# Patient Record
Sex: FEMALE | Race: WHITE | NOT HISPANIC OR LATINO | Employment: FULL TIME | URBAN - METROPOLITAN AREA
[De-identification: names, ages, dates, MRNs, and addresses within clinical notes are randomized per-mention and may not be internally consistent; named-entity substitution may affect disease eponyms.]

---

## 2017-10-04 ENCOUNTER — COMMUNICATION - HEALTHEAST (OUTPATIENT)
Dept: FAMILY MEDICINE | Facility: CLINIC | Age: 32
End: 2017-10-04

## 2017-10-20 ENCOUNTER — OFFICE VISIT - HEALTHEAST (OUTPATIENT)
Dept: FAMILY MEDICINE | Facility: CLINIC | Age: 32
End: 2017-10-20

## 2017-10-20 ENCOUNTER — COMMUNICATION - HEALTHEAST (OUTPATIENT)
Dept: TELEHEALTH | Facility: CLINIC | Age: 32
End: 2017-10-20

## 2017-10-20 DIAGNOSIS — Z00.00 ROUTINE GENERAL MEDICAL EXAMINATION AT A HEALTH CARE FACILITY: ICD-10-CM

## 2017-10-20 ASSESSMENT — MIFFLIN-ST. JEOR: SCORE: 1315.5

## 2018-01-05 ENCOUNTER — AMBULATORY - HEALTHEAST (OUTPATIENT)
Dept: LAB | Facility: CLINIC | Age: 33
End: 2018-01-05

## 2018-01-05 DIAGNOSIS — Z00.00 ROUTINE GENERAL MEDICAL EXAMINATION AT A HEALTH CARE FACILITY: ICD-10-CM

## 2018-01-05 LAB
CHOLEST SERPL-MCNC: 222 MG/DL
FASTING STATUS PATIENT QL REPORTED: YES
FASTING STATUS PATIENT QL REPORTED: YES
GLUCOSE BLD-MCNC: 86 MG/DL (ref 70–125)
HDLC SERPL-MCNC: 71 MG/DL
LDLC SERPL CALC-MCNC: 115 MG/DL
TRIGL SERPL-MCNC: 181 MG/DL
TSH SERPL DL<=0.005 MIU/L-ACNC: 1.5 UIU/ML (ref 0.3–5)

## 2018-01-08 ENCOUNTER — COMMUNICATION - HEALTHEAST (OUTPATIENT)
Dept: FAMILY MEDICINE | Facility: CLINIC | Age: 33
End: 2018-01-08

## 2018-01-08 LAB — 25(OH)D3 SERPL-MCNC: 28.5 NG/ML (ref 30–80)

## 2018-05-21 ENCOUNTER — OFFICE VISIT - HEALTHEAST (OUTPATIENT)
Dept: FAMILY MEDICINE | Facility: CLINIC | Age: 33
End: 2018-05-21

## 2018-05-21 DIAGNOSIS — Z12.4 SCREENING FOR CERVICAL CANCER: ICD-10-CM

## 2018-05-21 DIAGNOSIS — Z01.419 PAP SMEAR, LOW-RISK: ICD-10-CM

## 2018-05-21 DIAGNOSIS — Z31.69 ENCOUNTER FOR PRECONCEPTION CONSULTATION: ICD-10-CM

## 2018-05-22 LAB
HPV SOURCE: ABNORMAL
HUMAN PAPILLOMA VIRUS 16 DNA: NEGATIVE
HUMAN PAPILLOMA VIRUS 18 DNA: NEGATIVE
HUMAN PAPILLOMA VIRUS FINAL DIAGNOSIS: ABNORMAL
HUMAN PAPILLOMA VIRUS OTHER HR: POSITIVE
SPECIMEN DESCRIPTION: ABNORMAL

## 2018-05-29 LAB
BKR LAB AP ABNORMAL BLEEDING: NO
BKR LAB AP BIRTH CONTROL/HORMONES: ABNORMAL
BKR LAB AP CERVICAL APPEARANCE: ABNORMAL
BKR LAB AP GYN ADEQUACY: ABNORMAL
BKR LAB AP GYN INTERPRETATION: ABNORMAL
BKR LAB AP HPV REFLEX: ABNORMAL
BKR LAB AP LMP: ABNORMAL
BKR LAB AP PATIENT STATUS: ABNORMAL
BKR LAB AP PREVIOUS ABNORMAL: ABNORMAL
BKR LAB AP PREVIOUS NORMAL: 2014
HIGH RISK?: NO
PATH REPORT.COMMENTS IMP SPEC: ABNORMAL
RESULT FLAG (HE HISTORICAL CONVERSION): ABNORMAL

## 2018-05-30 ENCOUNTER — COMMUNICATION - HEALTHEAST (OUTPATIENT)
Dept: FAMILY MEDICINE | Facility: CLINIC | Age: 33
End: 2018-05-30

## 2018-05-30 ENCOUNTER — AMBULATORY - HEALTHEAST (OUTPATIENT)
Dept: FAMILY MEDICINE | Facility: CLINIC | Age: 33
End: 2018-05-30

## 2018-05-30 DIAGNOSIS — R87.810 ASCUS WITH POSITIVE HIGH RISK HPV CERVICAL: ICD-10-CM

## 2018-05-30 DIAGNOSIS — R87.610 ASCUS WITH POSITIVE HIGH RISK HPV CERVICAL: ICD-10-CM

## 2018-06-03 ENCOUNTER — RECORDS - HEALTHEAST (OUTPATIENT)
Dept: ADMINISTRATIVE | Facility: OTHER | Age: 33
End: 2018-06-03

## 2018-07-10 ENCOUNTER — COMMUNICATION - HEALTHEAST (OUTPATIENT)
Dept: FAMILY MEDICINE | Facility: CLINIC | Age: 33
End: 2018-07-10

## 2018-07-11 ENCOUNTER — AMBULATORY - HEALTHEAST (OUTPATIENT)
Dept: FAMILY MEDICINE | Facility: CLINIC | Age: 33
End: 2018-07-11

## 2018-07-11 DIAGNOSIS — Z32.01 PREGNANCY TEST POSITIVE: ICD-10-CM

## 2018-07-19 ENCOUNTER — AMBULATORY - HEALTHEAST (OUTPATIENT)
Dept: FAMILY MEDICINE | Facility: CLINIC | Age: 33
End: 2018-07-19

## 2018-07-19 ENCOUNTER — COMMUNICATION - HEALTHEAST (OUTPATIENT)
Dept: FAMILY MEDICINE | Facility: CLINIC | Age: 33
End: 2018-07-19

## 2018-07-19 DIAGNOSIS — Z32.01 PREGNANCY TEST POSITIVE: ICD-10-CM

## 2018-07-20 ENCOUNTER — COMMUNICATION - HEALTHEAST (OUTPATIENT)
Dept: FAMILY MEDICINE | Facility: CLINIC | Age: 33
End: 2018-07-20

## 2018-07-20 ENCOUNTER — AMBULATORY - HEALTHEAST (OUTPATIENT)
Dept: FAMILY MEDICINE | Facility: CLINIC | Age: 33
End: 2018-07-20

## 2018-07-20 ENCOUNTER — AMBULATORY - HEALTHEAST (OUTPATIENT)
Dept: LAB | Facility: CLINIC | Age: 33
End: 2018-07-20

## 2018-07-20 DIAGNOSIS — Z32.01 PREGNANCY TEST POSITIVE: ICD-10-CM

## 2018-07-20 LAB — HCG SERPL-ACNC: ABNORMAL MLU/ML (ref 0–4)

## 2018-07-22 ENCOUNTER — AMBULATORY - HEALTHEAST (OUTPATIENT)
Dept: LAB | Facility: CLINIC | Age: 33
End: 2018-07-22

## 2018-07-22 DIAGNOSIS — Z32.01 PREGNANCY TEST POSITIVE: ICD-10-CM

## 2018-07-22 LAB — HCG SERPL-ACNC: ABNORMAL MLU/ML (ref 0–4)

## 2018-07-23 ENCOUNTER — AMBULATORY - HEALTHEAST (OUTPATIENT)
Dept: FAMILY MEDICINE | Facility: CLINIC | Age: 33
End: 2018-07-23

## 2018-07-23 DIAGNOSIS — O26.859 SPOTTING IN EARLY PREGNANCY: ICD-10-CM

## 2018-07-25 ENCOUNTER — HOSPITAL ENCOUNTER (OUTPATIENT)
Dept: ULTRASOUND IMAGING | Facility: CLINIC | Age: 33
Discharge: HOME | End: 2018-07-25
Attending: FAMILY MEDICINE

## 2018-07-25 ENCOUNTER — AMBULATORY - HEALTHEAST (OUTPATIENT)
Dept: LAB | Facility: CLINIC | Age: 33
End: 2018-07-25

## 2018-07-25 DIAGNOSIS — Z32.01 PREGNANCY TEST POSITIVE: ICD-10-CM

## 2018-07-25 DIAGNOSIS — O26.859 SPOTTING IN EARLY PREGNANCY: ICD-10-CM

## 2018-07-25 LAB — HCG SERPL-ACNC: ABNORMAL MLU/ML (ref 0–4)

## 2018-07-30 ENCOUNTER — COMMUNICATION - HEALTHEAST (OUTPATIENT)
Dept: FAMILY MEDICINE | Facility: CLINIC | Age: 33
End: 2018-07-30

## 2018-08-24 ENCOUNTER — RECORDS - HEALTHEAST (OUTPATIENT)
Dept: ADMINISTRATIVE | Facility: OTHER | Age: 33
End: 2018-08-24

## 2018-08-24 ENCOUNTER — PRENATAL OFFICE VISIT - HEALTHEAST (OUTPATIENT)
Dept: FAMILY MEDICINE | Facility: CLINIC | Age: 33
End: 2018-08-24

## 2018-08-24 DIAGNOSIS — Z33.1 PREGNANT STATE, INCIDENTAL: ICD-10-CM

## 2018-08-24 DIAGNOSIS — Z34.01 SUPERVISION OF NORMAL FIRST PREGNANCY IN FIRST TRIMESTER: ICD-10-CM

## 2018-08-24 LAB
ALBUMIN UR-MCNC: NEGATIVE MG/DL
APPEARANCE UR: CLEAR
BASOPHILS # BLD AUTO: 0 THOU/UL (ref 0–0.2)
BASOPHILS NFR BLD AUTO: 0 % (ref 0–2)
BILIRUB UR QL STRIP: NEGATIVE
COLOR UR AUTO: YELLOW
EOSINOPHIL # BLD AUTO: 0.1 THOU/UL (ref 0–0.4)
EOSINOPHIL NFR BLD AUTO: 1 % (ref 0–6)
ERYTHROCYTE [DISTWIDTH] IN BLOOD BY AUTOMATED COUNT: 12.6 % (ref 11–14.5)
GLUCOSE UR STRIP-MCNC: NEGATIVE MG/DL
HCT VFR BLD AUTO: 36.1 % (ref 35–47)
HGB BLD-MCNC: 11.9 G/DL (ref 12–16)
HGB UR QL STRIP: ABNORMAL
HIV 1+2 AB+HIV1 P24 AG SERPL QL IA: NEGATIVE
KETONES UR STRIP-MCNC: NEGATIVE MG/DL
LEUKOCYTE ESTERASE UR QL STRIP: NEGATIVE
LYMPHOCYTES # BLD AUTO: 3.3 THOU/UL (ref 0.8–4.4)
LYMPHOCYTES NFR BLD AUTO: 33 % (ref 20–40)
MCH RBC QN AUTO: 31.5 PG (ref 27–34)
MCHC RBC AUTO-ENTMCNC: 33 G/DL (ref 32–36)
MCV RBC AUTO: 96 FL (ref 80–100)
MONOCYTES # BLD AUTO: 0.7 THOU/UL (ref 0–0.9)
MONOCYTES NFR BLD AUTO: 7 % (ref 2–10)
NEUTROPHILS # BLD AUTO: 5.9 THOU/UL (ref 2–7.7)
NEUTROPHILS NFR BLD AUTO: 59 % (ref 50–70)
NITRATE UR QL: NEGATIVE
PH UR STRIP: 6 [PH] (ref 5–8)
PLATELET # BLD AUTO: 237 THOU/UL (ref 140–440)
PMV BLD AUTO: 11.2 FL (ref 8.5–12.5)
RBC # BLD AUTO: 3.78 MILL/UL (ref 3.8–5.4)
SP GR UR STRIP: 1.02 (ref 1–1.03)
UROBILINOGEN UR STRIP-ACNC: ABNORMAL
WBC: 10.1 THOU/UL (ref 4–11)

## 2018-08-25 LAB
ANTIBODY SCREEN: NEGATIVE
BACTERIA SPEC CULT: NO GROWTH
HBV SURFACE AG SERPL QL IA: NEGATIVE
T PALLIDUM AB SER QL: NEGATIVE

## 2018-08-26 LAB
B19V IGG SER IA-ACNC: 5.6 IV
B19V IGM SER IA-ACNC: 0.27 IV
T GONDII IGG SER-ACNC: <3 IU/ML
T GONDII IGM SER-ACNC: <3 AU/ML

## 2018-08-27 LAB
ABO/RH(D): NORMAL
ABORH REPEAT: NORMAL
C TRACH DNA SPEC QL PROBE+SIG AMP: NEGATIVE
CMV IGG SERPL IA-ACNC: >8 AI (ref 0–0.8)
HCV AB SERPL QL IA: NEGATIVE
N GONORRHOEA DNA SPEC QL NAA+PROBE: NEGATIVE
RUBV IGG SERPL QL IA: POSITIVE

## 2018-09-01 ENCOUNTER — RECORDS - HEALTHEAST (OUTPATIENT)
Dept: ADMINISTRATIVE | Facility: OTHER | Age: 33
End: 2018-09-01

## 2018-09-05 ENCOUNTER — COMMUNICATION - HEALTHEAST (OUTPATIENT)
Dept: FAMILY MEDICINE | Facility: CLINIC | Age: 33
End: 2018-09-05

## 2018-09-16 ENCOUNTER — COMMUNICATION - HEALTHEAST (OUTPATIENT)
Dept: FAMILY MEDICINE | Facility: CLINIC | Age: 33
End: 2018-09-16

## 2018-09-17 ENCOUNTER — AMBULATORY - HEALTHEAST (OUTPATIENT)
Dept: FAMILY MEDICINE | Facility: CLINIC | Age: 33
End: 2018-09-17

## 2018-09-17 DIAGNOSIS — N39.3 FEMALE STRESS INCONTINENCE: ICD-10-CM

## 2018-09-21 ENCOUNTER — PRENATAL OFFICE VISIT - HEALTHEAST (OUTPATIENT)
Dept: FAMILY MEDICINE | Facility: CLINIC | Age: 33
End: 2018-09-21

## 2018-09-21 DIAGNOSIS — Z34.02 SUPERVISION OF NORMAL FIRST PREGNANCY IN SECOND TRIMESTER: ICD-10-CM

## 2018-09-24 ENCOUNTER — OFFICE VISIT - HEALTHEAST (OUTPATIENT)
Dept: PHYSICAL THERAPY | Facility: REHABILITATION | Age: 33
End: 2018-09-24

## 2018-09-24 DIAGNOSIS — N81.89 PELVIC FLOOR WEAKNESS IN FEMALE: ICD-10-CM

## 2018-09-24 DIAGNOSIS — Z34.90 PREGNANCY: ICD-10-CM

## 2018-09-24 DIAGNOSIS — N39.3 FEMALE STRESS INCONTINENCE: ICD-10-CM

## 2018-10-03 ENCOUNTER — OFFICE VISIT - HEALTHEAST (OUTPATIENT)
Dept: PHYSICAL THERAPY | Facility: REHABILITATION | Age: 33
End: 2018-10-03

## 2018-10-03 DIAGNOSIS — Z34.90 PREGNANCY: ICD-10-CM

## 2018-10-03 DIAGNOSIS — N39.3 FEMALE STRESS INCONTINENCE: ICD-10-CM

## 2018-10-03 DIAGNOSIS — N81.89 PELVIC FLOOR WEAKNESS IN FEMALE: ICD-10-CM

## 2018-10-10 ENCOUNTER — OFFICE VISIT - HEALTHEAST (OUTPATIENT)
Dept: PHYSICAL THERAPY | Facility: REHABILITATION | Age: 33
End: 2018-10-10

## 2018-10-10 DIAGNOSIS — N81.89 PELVIC FLOOR WEAKNESS IN FEMALE: ICD-10-CM

## 2018-10-10 DIAGNOSIS — N39.3 FEMALE STRESS INCONTINENCE: ICD-10-CM

## 2018-10-10 DIAGNOSIS — Z34.90 PREGNANCY: ICD-10-CM

## 2018-10-24 ENCOUNTER — OFFICE VISIT - HEALTHEAST (OUTPATIENT)
Dept: PHYSICAL THERAPY | Facility: REHABILITATION | Age: 33
End: 2018-10-24

## 2018-10-24 ENCOUNTER — HOSPITAL ENCOUNTER (OUTPATIENT)
Dept: ULTRASOUND IMAGING | Facility: CLINIC | Age: 33
Discharge: HOME | End: 2018-10-24
Attending: FAMILY MEDICINE

## 2018-10-24 DIAGNOSIS — N81.89 PELVIC FLOOR WEAKNESS IN FEMALE: ICD-10-CM

## 2018-10-24 DIAGNOSIS — N39.3 FEMALE STRESS INCONTINENCE: ICD-10-CM

## 2018-10-24 DIAGNOSIS — Z34.90 PREGNANCY: ICD-10-CM

## 2018-10-24 DIAGNOSIS — Z34.02 SUPERVISION OF NORMAL FIRST PREGNANCY IN SECOND TRIMESTER: ICD-10-CM

## 2018-10-31 ENCOUNTER — PRENATAL OFFICE VISIT - HEALTHEAST (OUTPATIENT)
Dept: FAMILY MEDICINE | Facility: CLINIC | Age: 33
End: 2018-10-31

## 2018-10-31 DIAGNOSIS — Z34.02 SUPERVISION OF NORMAL FIRST PREGNANCY IN SECOND TRIMESTER: ICD-10-CM

## 2018-11-07 ENCOUNTER — OFFICE VISIT - HEALTHEAST (OUTPATIENT)
Dept: PHYSICAL THERAPY | Facility: REHABILITATION | Age: 33
End: 2018-11-07

## 2018-11-07 DIAGNOSIS — Z34.90 PREGNANCY: ICD-10-CM

## 2018-11-07 DIAGNOSIS — N81.89 PELVIC FLOOR WEAKNESS IN FEMALE: ICD-10-CM

## 2018-11-07 DIAGNOSIS — N39.3 FEMALE STRESS INCONTINENCE: ICD-10-CM

## 2018-11-21 ENCOUNTER — PRENATAL OFFICE VISIT - HEALTHEAST (OUTPATIENT)
Dept: FAMILY MEDICINE | Facility: CLINIC | Age: 33
End: 2018-11-21

## 2018-11-21 DIAGNOSIS — Z34.02 SUPERVISION OF NORMAL FIRST PREGNANCY IN SECOND TRIMESTER: ICD-10-CM

## 2018-12-12 ENCOUNTER — AMBULATORY - HEALTHEAST (OUTPATIENT)
Dept: FAMILY MEDICINE | Facility: CLINIC | Age: 33
End: 2018-12-12

## 2018-12-12 ENCOUNTER — OFFICE VISIT - HEALTHEAST (OUTPATIENT)
Dept: PHYSICAL THERAPY | Facility: REHABILITATION | Age: 33
End: 2018-12-12

## 2018-12-12 DIAGNOSIS — N81.89 PELVIC FLOOR WEAKNESS IN FEMALE: ICD-10-CM

## 2018-12-12 DIAGNOSIS — N39.3 FEMALE STRESS INCONTINENCE: ICD-10-CM

## 2018-12-12 DIAGNOSIS — M53.3 SI (SACROILIAC) JOINT DYSFUNCTION: ICD-10-CM

## 2018-12-12 DIAGNOSIS — Z34.90 PREGNANCY: ICD-10-CM

## 2018-12-19 ENCOUNTER — PRENATAL OFFICE VISIT - HEALTHEAST (OUTPATIENT)
Dept: FAMILY MEDICINE | Facility: CLINIC | Age: 33
End: 2018-12-19

## 2018-12-19 ENCOUNTER — COMMUNICATION - HEALTHEAST (OUTPATIENT)
Dept: FAMILY MEDICINE | Facility: CLINIC | Age: 33
End: 2018-12-19

## 2018-12-19 DIAGNOSIS — Z34.03 SUPERVISION OF NORMAL FIRST PREGNANCY IN THIRD TRIMESTER: ICD-10-CM

## 2018-12-19 DIAGNOSIS — Z34.02 SUPERVISION OF NORMAL FIRST PREGNANCY IN SECOND TRIMESTER: ICD-10-CM

## 2018-12-19 DIAGNOSIS — M53.3 SI (SACROILIAC) JOINT DYSFUNCTION: ICD-10-CM

## 2018-12-19 LAB
FASTING STATUS PATIENT QL REPORTED: NO
GLUCOSE 1H P 50 G GLC PO SERPL-MCNC: 112 MG/DL (ref 70–139)
HGB BLD-MCNC: 11.9 G/DL (ref 12–16)

## 2018-12-20 ENCOUNTER — COMMUNICATION - HEALTHEAST (OUTPATIENT)
Dept: FAMILY MEDICINE | Facility: CLINIC | Age: 33
End: 2018-12-20

## 2018-12-20 LAB — T PALLIDUM AB SER QL: NEGATIVE

## 2019-01-02 ENCOUNTER — PRENATAL OFFICE VISIT - HEALTHEAST (OUTPATIENT)
Dept: FAMILY MEDICINE | Facility: CLINIC | Age: 34
End: 2019-01-02

## 2019-01-02 DIAGNOSIS — Z34.03 SUPERVISION OF NORMAL FIRST PREGNANCY IN THIRD TRIMESTER: ICD-10-CM

## 2019-01-03 ENCOUNTER — COMMUNICATION - HEALTHEAST (OUTPATIENT)
Dept: ADMINISTRATIVE | Facility: CLINIC | Age: 34
End: 2019-01-03

## 2019-01-16 ENCOUNTER — PRENATAL OFFICE VISIT - HEALTHEAST (OUTPATIENT)
Dept: FAMILY MEDICINE | Facility: CLINIC | Age: 34
End: 2019-01-16

## 2019-01-16 ENCOUNTER — OFFICE VISIT - HEALTHEAST (OUTPATIENT)
Dept: PHYSICAL THERAPY | Facility: REHABILITATION | Age: 34
End: 2019-01-16

## 2019-01-16 DIAGNOSIS — M54.6 PAIN IN THORACIC SPINE: ICD-10-CM

## 2019-01-16 DIAGNOSIS — Z34.03 SUPERVISION OF NORMAL FIRST PREGNANCY IN THIRD TRIMESTER: ICD-10-CM

## 2019-01-16 DIAGNOSIS — M53.3 SACRAL PAIN: ICD-10-CM

## 2019-01-30 ENCOUNTER — PRENATAL OFFICE VISIT - HEALTHEAST (OUTPATIENT)
Dept: FAMILY MEDICINE | Facility: CLINIC | Age: 34
End: 2019-01-30

## 2019-01-30 DIAGNOSIS — Z34.03 SUPERVISION OF NORMAL FIRST PREGNANCY IN THIRD TRIMESTER: ICD-10-CM

## 2019-02-13 ENCOUNTER — PRENATAL OFFICE VISIT - HEALTHEAST (OUTPATIENT)
Dept: FAMILY MEDICINE | Facility: CLINIC | Age: 34
End: 2019-02-13

## 2019-02-13 ENCOUNTER — COMMUNICATION - HEALTHEAST (OUTPATIENT)
Dept: FAMILY MEDICINE | Facility: CLINIC | Age: 34
End: 2019-02-13

## 2019-02-13 DIAGNOSIS — N89.8 VAGINAL DISCHARGE: ICD-10-CM

## 2019-02-13 DIAGNOSIS — Z34.03 SUPERVISION OF NORMAL FIRST PREGNANCY IN THIRD TRIMESTER: ICD-10-CM

## 2019-02-13 DIAGNOSIS — Z32.01 PREGNANCY TEST POSITIVE: ICD-10-CM

## 2019-02-13 LAB
CLUE CELLS: ABNORMAL
HGB BLD-MCNC: 12.5 G/DL (ref 12–16)
TRICHOMONAS, WET PREP: ABNORMAL
YEAST, WET PREP: ABNORMAL

## 2019-02-14 ENCOUNTER — COMMUNICATION - HEALTHEAST (OUTPATIENT)
Dept: FAMILY MEDICINE | Facility: CLINIC | Age: 34
End: 2019-02-14

## 2019-02-14 LAB
ALLERGIC TO PENICILLIN: NO
GP B STREP DNA SPEC QL NAA+PROBE: NEGATIVE

## 2019-02-20 ENCOUNTER — PRENATAL OFFICE VISIT - HEALTHEAST (OUTPATIENT)
Dept: FAMILY MEDICINE | Facility: CLINIC | Age: 34
End: 2019-02-20

## 2019-02-20 DIAGNOSIS — Z34.03 SUPERVISION OF NORMAL FIRST PREGNANCY IN THIRD TRIMESTER: ICD-10-CM

## 2019-02-27 ENCOUNTER — PRENATAL OFFICE VISIT - HEALTHEAST (OUTPATIENT)
Dept: FAMILY MEDICINE | Facility: CLINIC | Age: 34
End: 2019-02-27

## 2019-02-27 ENCOUNTER — COMMUNICATION - HEALTHEAST (OUTPATIENT)
Dept: FAMILY MEDICINE | Facility: CLINIC | Age: 34
End: 2019-02-27

## 2019-02-27 DIAGNOSIS — Z34.03 SUPERVISION OF NORMAL FIRST PREGNANCY IN THIRD TRIMESTER: ICD-10-CM

## 2019-02-27 LAB
ALBUMIN UR-MCNC: NEGATIVE MG/DL
CLUE CELLS: NORMAL
GLUCOSE UR STRIP-MCNC: NEGATIVE MG/DL
KETONES UR STRIP-MCNC: NEGATIVE MG/DL
TRICHOMONAS, WET PREP: NORMAL
YEAST, WET PREP: NORMAL

## 2019-03-04 ENCOUNTER — AMBULATORY - HEALTHEAST (OUTPATIENT)
Dept: OTHER | Facility: CLINIC | Age: 34
End: 2019-03-04

## 2019-03-06 ENCOUNTER — PRENATAL OFFICE VISIT - HEALTHEAST (OUTPATIENT)
Dept: FAMILY MEDICINE | Facility: CLINIC | Age: 34
End: 2019-03-06

## 2019-03-06 DIAGNOSIS — Z34.03 SUPERVISION OF NORMAL FIRST PREGNANCY IN THIRD TRIMESTER: ICD-10-CM

## 2019-03-06 LAB
ALBUMIN UR-MCNC: NEGATIVE MG/DL
GLUCOSE UR STRIP-MCNC: NEGATIVE MG/DL
KETONES UR STRIP-MCNC: NEGATIVE MG/DL

## 2019-03-07 ENCOUNTER — ANESTHESIA - HEALTHEAST (OUTPATIENT)
Dept: OBGYN | Facility: CLINIC | Age: 34
End: 2019-03-07

## 2019-03-09 ENCOUNTER — HOME CARE/HOSPICE - HEALTHEAST (OUTPATIENT)
Dept: HOME HEALTH SERVICES | Facility: HOME HEALTH | Age: 34
End: 2019-03-09

## 2019-04-18 ENCOUNTER — AMBULATORY - HEALTHEAST (OUTPATIENT)
Dept: FAMILY MEDICINE | Facility: CLINIC | Age: 34
End: 2019-04-18

## 2019-04-18 LAB — HCG UR QL: NEGATIVE

## 2019-04-22 ENCOUNTER — OFFICE VISIT - HEALTHEAST (OUTPATIENT)
Dept: FAMILY MEDICINE | Facility: CLINIC | Age: 34
End: 2019-04-22

## 2019-04-22 ENCOUNTER — COMMUNICATION - HEALTHEAST (OUTPATIENT)
Dept: FAMILY MEDICINE | Facility: CLINIC | Age: 34
End: 2019-04-22

## 2019-04-22 DIAGNOSIS — Z30.430 ENCOUNTER FOR IUD INSERTION: ICD-10-CM

## 2019-04-22 LAB — HGB BLD-MCNC: 13.5 G/DL (ref 12–16)

## 2019-04-29 LAB
BKR LAB AP ABNORMAL BLEEDING: NO
BKR LAB AP BIRTH CONTROL/HORMONES: NORMAL
BKR LAB AP CERVICAL APPEARANCE: NORMAL
BKR LAB AP GYN ADEQUACY: NORMAL
BKR LAB AP GYN INTERPRETATION: NORMAL
BKR LAB AP HPV REFLEX: NORMAL
BKR LAB AP LMP: NORMAL
BKR LAB AP PATIENT STATUS: NORMAL
BKR LAB AP PREVIOUS ABNORMAL: 2018
BKR LAB AP PREVIOUS NORMAL: 2018
HIGH RISK?: NO
PATH REPORT.COMMENTS IMP SPEC: NORMAL
RESULT FLAG (HE HISTORICAL CONVERSION): NORMAL

## 2019-05-06 ENCOUNTER — COMMUNICATION - HEALTHEAST (OUTPATIENT)
Dept: FAMILY MEDICINE | Facility: CLINIC | Age: 34
End: 2019-05-06

## 2019-06-06 ENCOUNTER — COMMUNICATION - HEALTHEAST (OUTPATIENT)
Dept: FAMILY MEDICINE | Facility: CLINIC | Age: 34
End: 2019-06-06

## 2019-06-14 ENCOUNTER — COMMUNICATION - HEALTHEAST (OUTPATIENT)
Dept: MIDWIFE SERVICES | Facility: CLINIC | Age: 34
End: 2019-06-14

## 2019-07-10 ENCOUNTER — OFFICE VISIT - HEALTHEAST (OUTPATIENT)
Dept: FAMILY MEDICINE | Facility: CLINIC | Age: 34
End: 2019-07-10

## 2019-07-10 DIAGNOSIS — F51.01 PRIMARY INSOMNIA: ICD-10-CM

## 2019-11-07 ENCOUNTER — AMBULATORY - HEALTHEAST (OUTPATIENT)
Dept: FAMILY MEDICINE | Facility: CLINIC | Age: 34
End: 2019-11-07

## 2019-11-07 ENCOUNTER — AMBULATORY - HEALTHEAST (OUTPATIENT)
Dept: LAB | Facility: CLINIC | Age: 34
End: 2019-11-07

## 2019-11-07 DIAGNOSIS — N89.8 VAGINAL DISCHARGE: ICD-10-CM

## 2019-11-07 LAB
CLUE CELLS: NORMAL
TRICHOMONAS, WET PREP: NORMAL
YEAST, WET PREP: NORMAL

## 2019-12-16 ENCOUNTER — OFFICE VISIT - HEALTHEAST (OUTPATIENT)
Dept: FAMILY MEDICINE | Facility: CLINIC | Age: 34
End: 2019-12-16

## 2019-12-16 DIAGNOSIS — B37.31 YEAST VAGINITIS: ICD-10-CM

## 2019-12-16 DIAGNOSIS — N89.8 VAGINAL DISCHARGE: ICD-10-CM

## 2019-12-16 LAB
CLUE CELLS: ABNORMAL
TRICHOMONAS, WET PREP: ABNORMAL
YEAST, WET PREP: ABNORMAL

## 2019-12-16 ASSESSMENT — MIFFLIN-ST. JEOR: SCORE: 1283.75

## 2020-01-31 ENCOUNTER — COMMUNICATION - HEALTHEAST (OUTPATIENT)
Dept: FAMILY MEDICINE | Facility: CLINIC | Age: 35
End: 2020-01-31

## 2020-02-21 ENCOUNTER — COMMUNICATION - HEALTHEAST (OUTPATIENT)
Dept: FAMILY MEDICINE | Facility: CLINIC | Age: 35
End: 2020-02-21

## 2020-02-21 ENCOUNTER — AMBULATORY - HEALTHEAST (OUTPATIENT)
Dept: FAMILY MEDICINE | Facility: CLINIC | Age: 35
End: 2020-02-21

## 2020-02-21 ENCOUNTER — OFFICE VISIT - HEALTHEAST (OUTPATIENT)
Dept: FAMILY MEDICINE | Facility: CLINIC | Age: 35
End: 2020-02-21

## 2020-02-21 DIAGNOSIS — Z78.9 USES BIRTH CONTROL: ICD-10-CM

## 2020-02-21 DIAGNOSIS — Z30.432 ENCOUNTER FOR IUD REMOVAL: ICD-10-CM

## 2020-03-04 ENCOUNTER — AMBULATORY - HEALTHEAST (OUTPATIENT)
Dept: FAMILY MEDICINE | Facility: CLINIC | Age: 35
End: 2020-03-04

## 2020-03-04 DIAGNOSIS — Z20.828 EXPOSURE TO INFLUENZA: ICD-10-CM

## 2020-03-11 ENCOUNTER — OFFICE VISIT - HEALTHEAST (OUTPATIENT)
Dept: FAMILY MEDICINE | Facility: CLINIC | Age: 35
End: 2020-03-11

## 2020-03-11 DIAGNOSIS — M54.2 NECK PAIN: ICD-10-CM

## 2020-03-16 ENCOUNTER — RECORDS - HEALTHEAST (OUTPATIENT)
Dept: GENERAL RADIOLOGY | Facility: CLINIC | Age: 35
End: 2020-03-16

## 2020-03-16 DIAGNOSIS — M54.2 CERVICALGIA: ICD-10-CM

## 2020-03-18 ENCOUNTER — COMMUNICATION - HEALTHEAST (OUTPATIENT)
Dept: FAMILY MEDICINE | Facility: CLINIC | Age: 35
End: 2020-03-18

## 2020-03-21 ENCOUNTER — COMMUNICATION - HEALTHEAST (OUTPATIENT)
Dept: FAMILY MEDICINE | Facility: CLINIC | Age: 35
End: 2020-03-21

## 2020-03-23 ENCOUNTER — OFFICE VISIT - HEALTHEAST (OUTPATIENT)
Dept: FAMILY MEDICINE | Facility: CLINIC | Age: 35
End: 2020-03-23

## 2020-03-23 ENCOUNTER — AMBULATORY - HEALTHEAST (OUTPATIENT)
Dept: FAMILY MEDICINE | Facility: CLINIC | Age: 35
End: 2020-03-23

## 2020-03-23 DIAGNOSIS — F34.1 DYSTHYMIA: ICD-10-CM

## 2020-03-23 DIAGNOSIS — F51.01 PRIMARY INSOMNIA: ICD-10-CM

## 2020-03-23 DIAGNOSIS — M53.3 SI (SACROILIAC) JOINT DYSFUNCTION: ICD-10-CM

## 2020-03-23 RX ORDER — CYCLOBENZAPRINE HCL 10 MG
5 TABLET ORAL EVERY 8 HOURS PRN
Qty: 30 TABLET | Refills: 1 | Status: SHIPPED | OUTPATIENT
Start: 2020-03-23 | End: 2021-09-15

## 2020-03-23 ASSESSMENT — ANXIETY QUESTIONNAIRES
7. FEELING AFRAID AS IF SOMETHING AWFUL MIGHT HAPPEN: NEARLY EVERY DAY
GAD7 TOTAL SCORE: 19
IF YOU CHECKED OFF ANY PROBLEMS ON THIS QUESTIONNAIRE, HOW DIFFICULT HAVE THESE PROBLEMS MADE IT FOR YOU TO DO YOUR WORK, TAKE CARE OF THINGS AT HOME, OR GET ALONG WITH OTHER PEOPLE: EXTREMELY DIFFICULT
3. WORRYING TOO MUCH ABOUT DIFFERENT THINGS: MORE THAN HALF THE DAYS
1. FEELING NERVOUS, ANXIOUS, OR ON EDGE: NEARLY EVERY DAY
6. BECOMING EASILY ANNOYED OR IRRITABLE: NEARLY EVERY DAY
4. TROUBLE RELAXING: NEARLY EVERY DAY
2. NOT BEING ABLE TO STOP OR CONTROL WORRYING: MORE THAN HALF THE DAYS
5. BEING SO RESTLESS THAT IT IS HARD TO SIT STILL: NEARLY EVERY DAY

## 2020-03-23 ASSESSMENT — PATIENT HEALTH QUESTIONNAIRE - PHQ9: SUM OF ALL RESPONSES TO PHQ QUESTIONS 1-9: 11

## 2020-03-25 ENCOUNTER — COMMUNICATION - HEALTHEAST (OUTPATIENT)
Dept: FAMILY MEDICINE | Facility: CLINIC | Age: 35
End: 2020-03-25

## 2020-03-26 ENCOUNTER — AMBULATORY - HEALTHEAST (OUTPATIENT)
Dept: FAMILY MEDICINE | Facility: CLINIC | Age: 35
End: 2020-03-26

## 2020-03-26 DIAGNOSIS — B00.9 HERPES SIMPLEX VIRUS INFECTION: ICD-10-CM

## 2020-04-07 ENCOUNTER — COMMUNICATION - HEALTHEAST (OUTPATIENT)
Dept: PHYSICAL THERAPY | Facility: REHABILITATION | Age: 35
End: 2020-04-07

## 2020-04-09 ENCOUNTER — OFFICE VISIT - HEALTHEAST (OUTPATIENT)
Dept: FAMILY MEDICINE | Facility: CLINIC | Age: 35
End: 2020-04-09

## 2020-04-09 DIAGNOSIS — R05.9 COUGH: ICD-10-CM

## 2020-04-10 ENCOUNTER — COMMUNICATION - HEALTHEAST (OUTPATIENT)
Dept: FAMILY MEDICINE | Facility: CLINIC | Age: 35
End: 2020-04-10

## 2020-04-16 ENCOUNTER — OFFICE VISIT - HEALTHEAST (OUTPATIENT)
Dept: FAMILY MEDICINE | Facility: CLINIC | Age: 35
End: 2020-04-16

## 2020-04-16 DIAGNOSIS — F34.1 DYSTHYMIA: ICD-10-CM

## 2020-04-16 DIAGNOSIS — Z79.899 MEDICATION MANAGEMENT: ICD-10-CM

## 2020-04-16 ASSESSMENT — ANXIETY QUESTIONNAIRES
2. NOT BEING ABLE TO STOP OR CONTROL WORRYING: SEVERAL DAYS
3. WORRYING TOO MUCH ABOUT DIFFERENT THINGS: NEARLY EVERY DAY
IF YOU CHECKED OFF ANY PROBLEMS ON THIS QUESTIONNAIRE, HOW DIFFICULT HAVE THESE PROBLEMS MADE IT FOR YOU TO DO YOUR WORK, TAKE CARE OF THINGS AT HOME, OR GET ALONG WITH OTHER PEOPLE: SOMEWHAT DIFFICULT
6. BECOMING EASILY ANNOYED OR IRRITABLE: SEVERAL DAYS
7. FEELING AFRAID AS IF SOMETHING AWFUL MIGHT HAPPEN: SEVERAL DAYS
4. TROUBLE RELAXING: NEARLY EVERY DAY
1. FEELING NERVOUS, ANXIOUS, OR ON EDGE: MORE THAN HALF THE DAYS
GAD7 TOTAL SCORE: 14
5. BEING SO RESTLESS THAT IT IS HARD TO SIT STILL: NEARLY EVERY DAY

## 2020-04-16 ASSESSMENT — PATIENT HEALTH QUESTIONNAIRE - PHQ9: SUM OF ALL RESPONSES TO PHQ QUESTIONS 1-9: 9

## 2020-04-20 ENCOUNTER — OFFICE VISIT - HEALTHEAST (OUTPATIENT)
Dept: PHYSICAL THERAPY | Facility: REHABILITATION | Age: 35
End: 2020-04-20

## 2020-04-20 DIAGNOSIS — M54.50 ACUTE RIGHT-SIDED LOW BACK PAIN WITHOUT SCIATICA: ICD-10-CM

## 2020-04-20 DIAGNOSIS — M54.2 NECK PAIN: ICD-10-CM

## 2020-04-23 ENCOUNTER — COMMUNICATION - HEALTHEAST (OUTPATIENT)
Dept: FAMILY MEDICINE | Facility: CLINIC | Age: 35
End: 2020-04-23

## 2020-04-23 DIAGNOSIS — B00.9 HERPES SIMPLEX VIRUS INFECTION: ICD-10-CM

## 2020-04-24 ENCOUNTER — COMMUNICATION - HEALTHEAST (OUTPATIENT)
Dept: FAMILY MEDICINE | Facility: CLINIC | Age: 35
End: 2020-04-24

## 2020-04-24 DIAGNOSIS — F34.1 DYSTHYMIA: ICD-10-CM

## 2020-05-01 ENCOUNTER — COMMUNICATION - HEALTHEAST (OUTPATIENT)
Dept: FAMILY MEDICINE | Facility: CLINIC | Age: 35
End: 2020-05-01

## 2020-05-01 ENCOUNTER — OFFICE VISIT - HEALTHEAST (OUTPATIENT)
Dept: PHYSICAL THERAPY | Facility: REHABILITATION | Age: 35
End: 2020-05-01

## 2020-05-01 DIAGNOSIS — M54.50 ACUTE RIGHT-SIDED LOW BACK PAIN WITHOUT SCIATICA: ICD-10-CM

## 2020-05-01 DIAGNOSIS — M54.2 NECK PAIN: ICD-10-CM

## 2020-05-01 DIAGNOSIS — F34.1 DYSTHYMIA: ICD-10-CM

## 2020-05-08 ENCOUNTER — OFFICE VISIT - HEALTHEAST (OUTPATIENT)
Dept: FAMILY MEDICINE | Facility: CLINIC | Age: 35
End: 2020-05-08

## 2020-05-08 DIAGNOSIS — S05.02XA ABRASION OF LEFT CORNEA, INITIAL ENCOUNTER: ICD-10-CM

## 2020-05-15 ENCOUNTER — OFFICE VISIT - HEALTHEAST (OUTPATIENT)
Dept: PHYSICAL THERAPY | Facility: REHABILITATION | Age: 35
End: 2020-05-15

## 2020-05-15 DIAGNOSIS — M54.2 NECK PAIN: ICD-10-CM

## 2020-05-15 DIAGNOSIS — M54.50 ACUTE RIGHT-SIDED LOW BACK PAIN WITHOUT SCIATICA: ICD-10-CM

## 2020-05-15 DIAGNOSIS — M53.3 SACRAL PAIN: ICD-10-CM

## 2020-06-12 ENCOUNTER — OFFICE VISIT - HEALTHEAST (OUTPATIENT)
Dept: PHYSICAL THERAPY | Facility: REHABILITATION | Age: 35
End: 2020-06-12

## 2020-06-12 DIAGNOSIS — M54.50 ACUTE RIGHT-SIDED LOW BACK PAIN WITHOUT SCIATICA: ICD-10-CM

## 2020-06-12 DIAGNOSIS — M53.3 SACRAL PAIN: ICD-10-CM

## 2020-06-12 DIAGNOSIS — M54.2 NECK PAIN: ICD-10-CM

## 2020-08-03 ENCOUNTER — AMBULATORY - HEALTHEAST (OUTPATIENT)
Dept: FAMILY MEDICINE | Facility: CLINIC | Age: 35
End: 2020-08-03

## 2020-08-03 ENCOUNTER — COMMUNICATION - HEALTHEAST (OUTPATIENT)
Dept: FAMILY MEDICINE | Facility: CLINIC | Age: 35
End: 2020-08-03

## 2020-08-03 DIAGNOSIS — R05.9 COUGH: ICD-10-CM

## 2020-08-04 ENCOUNTER — COMMUNICATION - HEALTHEAST (OUTPATIENT)
Dept: FAMILY MEDICINE | Facility: CLINIC | Age: 35
End: 2020-08-04

## 2020-08-05 ENCOUNTER — COMMUNICATION - HEALTHEAST (OUTPATIENT)
Dept: SCHEDULING | Facility: CLINIC | Age: 35
End: 2020-08-05

## 2020-08-10 ENCOUNTER — COMMUNICATION - HEALTHEAST (OUTPATIENT)
Dept: FAMILY MEDICINE | Facility: CLINIC | Age: 35
End: 2020-08-10

## 2020-08-10 ENCOUNTER — AMBULATORY - HEALTHEAST (OUTPATIENT)
Dept: FAMILY MEDICINE | Facility: CLINIC | Age: 35
End: 2020-08-10

## 2020-08-10 DIAGNOSIS — B37.31 YEAST VAGINITIS: ICD-10-CM

## 2020-09-23 ENCOUNTER — OFFICE VISIT - HEALTHEAST (OUTPATIENT)
Dept: FAMILY MEDICINE | Facility: CLINIC | Age: 35
End: 2020-09-23

## 2020-09-23 DIAGNOSIS — Z00.00 ROUTINE GENERAL MEDICAL EXAMINATION AT A HEALTH CARE FACILITY: ICD-10-CM

## 2020-09-23 DIAGNOSIS — Z13.9 SCREENING FOR CONDITION: ICD-10-CM

## 2020-09-23 DIAGNOSIS — Z84.1 FAMILY HISTORY OF KIDNEY DISEASE: ICD-10-CM

## 2020-09-23 LAB
ALBUMIN SERPL-MCNC: 4.1 G/DL (ref 3.5–5)
ALP SERPL-CCNC: 48 U/L (ref 45–120)
ALT SERPL W P-5'-P-CCNC: 13 U/L (ref 0–45)
ANION GAP SERPL CALCULATED.3IONS-SCNC: 6 MMOL/L (ref 5–18)
AST SERPL W P-5'-P-CCNC: 20 U/L (ref 0–40)
BILIRUB SERPL-MCNC: 0.9 MG/DL (ref 0–1)
BUN SERPL-MCNC: 13 MG/DL (ref 8–22)
CALCIUM SERPL-MCNC: 9.4 MG/DL (ref 8.5–10.5)
CHLORIDE BLD-SCNC: 104 MMOL/L (ref 98–107)
CHOLEST SERPL-MCNC: 244 MG/DL
CO2 SERPL-SCNC: 29 MMOL/L (ref 22–31)
CREAT SERPL-MCNC: 0.93 MG/DL (ref 0.6–1.1)
FASTING STATUS PATIENT QL REPORTED: YES
GFR SERPL CREATININE-BSD FRML MDRD: >60 ML/MIN/1.73M2
GLUCOSE BLD-MCNC: 97 MG/DL (ref 70–125)
HDLC SERPL-MCNC: 76 MG/DL
LDLC SERPL CALC-MCNC: 148 MG/DL
POTASSIUM BLD-SCNC: 4.4 MMOL/L (ref 3.5–5)
PROT SERPL-MCNC: 7.1 G/DL (ref 6–8)
SODIUM SERPL-SCNC: 139 MMOL/L (ref 136–145)
TRIGL SERPL-MCNC: 100 MG/DL

## 2020-09-23 ASSESSMENT — MIFFLIN-ST. JEOR: SCORE: 1319.13

## 2020-09-23 ASSESSMENT — PATIENT HEALTH QUESTIONNAIRE - PHQ9: SUM OF ALL RESPONSES TO PHQ QUESTIONS 1-9: 0

## 2020-09-24 ENCOUNTER — COMMUNICATION - HEALTHEAST (OUTPATIENT)
Dept: FAMILY MEDICINE | Facility: CLINIC | Age: 35
End: 2020-09-24

## 2020-09-24 LAB — 25(OH)D3 SERPL-MCNC: 42 NG/ML (ref 30–80)

## 2020-10-02 ENCOUNTER — OFFICE VISIT - HEALTHEAST (OUTPATIENT)
Dept: FAMILY MEDICINE | Facility: CLINIC | Age: 35
End: 2020-10-02

## 2020-10-02 DIAGNOSIS — Z13.9 SCREENING FOR CONDITION: ICD-10-CM

## 2020-10-05 LAB
HPV SOURCE: NORMAL
HUMAN PAPILLOMA VIRUS 16 DNA: NEGATIVE
HUMAN PAPILLOMA VIRUS 18 DNA: NEGATIVE
HUMAN PAPILLOMA VIRUS FINAL DIAGNOSIS: NORMAL
HUMAN PAPILLOMA VIRUS OTHER HR: NEGATIVE
SPECIMEN DESCRIPTION: NORMAL

## 2020-10-09 LAB
BKR LAB AP ABNORMAL BLEEDING: NO
BKR LAB AP BIRTH CONTROL/HORMONES: NORMAL
BKR LAB AP CERVICAL APPEARANCE: NORMAL
BKR LAB AP GYN ADEQUACY: NORMAL
BKR LAB AP GYN INTERPRETATION: NORMAL
BKR LAB AP HPV REFLEX: NORMAL
BKR LAB AP LMP: NORMAL
BKR LAB AP PATIENT STATUS: NORMAL
BKR LAB AP PREVIOUS ABNORMAL: NORMAL
BKR LAB AP PREVIOUS NORMAL: NORMAL
HIGH RISK?: YES
PATH REPORT.COMMENTS IMP SPEC: NORMAL
RESULT FLAG (HE HISTORICAL CONVERSION): NORMAL

## 2020-11-13 ENCOUNTER — OFFICE VISIT - HEALTHEAST (OUTPATIENT)
Dept: FAMILY MEDICINE | Facility: CLINIC | Age: 35
End: 2020-11-13

## 2020-11-13 DIAGNOSIS — Z20.822 CLOSE EXPOSURE TO 2019 NOVEL CORONAVIRUS: ICD-10-CM

## 2020-11-14 DIAGNOSIS — Z20.822 CLOSE EXPOSURE TO 2019 NOVEL CORONAVIRUS: Primary | ICD-10-CM

## 2020-11-14 PROCEDURE — U0003 INFECTIOUS AGENT DETECTION BY NUCLEIC ACID (DNA OR RNA); SEVERE ACUTE RESPIRATORY SYNDROME CORONAVIRUS 2 (SARS-COV-2) (CORONAVIRUS DISEASE [COVID-19]), AMPLIFIED PROBE TECHNIQUE, MAKING USE OF HIGH THROUGHPUT TECHNOLOGIES AS DESCRIBED BY CMS-2020-01-R: HCPCS | Performed by: PHYSICIAN ASSISTANT

## 2020-11-15 LAB
SARS-COV-2 RNA SPEC QL NAA+PROBE: NOT DETECTED
SPECIMEN SOURCE: NORMAL

## 2020-11-18 ENCOUNTER — COMMUNICATION - HEALTHEAST (OUTPATIENT)
Dept: FAMILY MEDICINE | Facility: CLINIC | Age: 35
End: 2020-11-18

## 2020-11-18 DIAGNOSIS — F51.01 PRIMARY INSOMNIA: ICD-10-CM

## 2020-12-18 ENCOUNTER — AMBULATORY - HEALTHEAST (OUTPATIENT)
Dept: FAMILY MEDICINE | Facility: CLINIC | Age: 35
End: 2020-12-18

## 2020-12-18 ENCOUNTER — COMMUNICATION - HEALTHEAST (OUTPATIENT)
Dept: FAMILY MEDICINE | Facility: CLINIC | Age: 35
End: 2020-12-18

## 2020-12-18 ENCOUNTER — COMMUNICATION - HEALTHEAST (OUTPATIENT)
Dept: PHYSICAL THERAPY | Facility: REHABILITATION | Age: 35
End: 2020-12-18

## 2020-12-18 DIAGNOSIS — R20.8 DYSESTHESIA AFFECTING BOTH SIDES OF BODY: ICD-10-CM

## 2021-01-04 ENCOUNTER — HEALTH MAINTENANCE LETTER (OUTPATIENT)
Age: 36
End: 2021-01-04

## 2021-01-06 ENCOUNTER — OFFICE VISIT - HEALTHEAST (OUTPATIENT)
Dept: PHYSICAL THERAPY | Facility: REHABILITATION | Age: 36
End: 2021-01-06

## 2021-01-06 DIAGNOSIS — M25.522 PAIN OF BOTH ELBOWS: ICD-10-CM

## 2021-01-06 DIAGNOSIS — M25.521 PAIN OF BOTH ELBOWS: ICD-10-CM

## 2021-01-15 ENCOUNTER — OFFICE VISIT - HEALTHEAST (OUTPATIENT)
Dept: PHYSICAL THERAPY | Facility: REHABILITATION | Age: 36
End: 2021-01-15

## 2021-01-15 DIAGNOSIS — M25.522 PAIN OF BOTH ELBOWS: ICD-10-CM

## 2021-01-15 DIAGNOSIS — M25.521 PAIN OF BOTH ELBOWS: ICD-10-CM

## 2021-01-22 ENCOUNTER — OFFICE VISIT - HEALTHEAST (OUTPATIENT)
Dept: PHYSICAL THERAPY | Facility: REHABILITATION | Age: 36
End: 2021-01-22

## 2021-01-22 DIAGNOSIS — M25.522 PAIN OF BOTH ELBOWS: ICD-10-CM

## 2021-01-22 DIAGNOSIS — M25.521 PAIN OF BOTH ELBOWS: ICD-10-CM

## 2021-01-28 ENCOUNTER — AMBULATORY - HEALTHEAST (OUTPATIENT)
Dept: LAB | Facility: CLINIC | Age: 36
End: 2021-01-28

## 2021-01-28 ENCOUNTER — AMBULATORY - HEALTHEAST (OUTPATIENT)
Dept: FAMILY MEDICINE | Facility: CLINIC | Age: 36
End: 2021-01-28

## 2021-01-28 DIAGNOSIS — J02.9 ACUTE SORE THROAT: ICD-10-CM

## 2021-01-28 LAB
DEPRECATED S PYO AG THROAT QL EIA: NORMAL
GROUP A STREP BY PCR: NORMAL

## 2021-02-05 ENCOUNTER — OFFICE VISIT - HEALTHEAST (OUTPATIENT)
Dept: PHYSICAL THERAPY | Facility: REHABILITATION | Age: 36
End: 2021-02-05

## 2021-02-05 DIAGNOSIS — M25.522 PAIN OF BOTH ELBOWS: ICD-10-CM

## 2021-02-05 DIAGNOSIS — M25.521 PAIN OF BOTH ELBOWS: ICD-10-CM

## 2021-02-18 ENCOUNTER — AMBULATORY - HEALTHEAST (OUTPATIENT)
Dept: FAMILY MEDICINE | Facility: CLINIC | Age: 36
End: 2021-02-18

## 2021-02-18 DIAGNOSIS — R05.9 COUGH: ICD-10-CM

## 2021-02-18 RX ORDER — BENZONATATE 200 MG/1
200 CAPSULE ORAL 3 TIMES DAILY PRN
Qty: 30 CAPSULE | Refills: 0 | Status: SHIPPED | OUTPATIENT
Start: 2021-02-18 | End: 2021-09-15

## 2021-02-23 ENCOUNTER — OFFICE VISIT - HEALTHEAST (OUTPATIENT)
Dept: FAMILY MEDICINE | Facility: CLINIC | Age: 36
End: 2021-02-23

## 2021-02-23 DIAGNOSIS — B00.9 HERPES SIMPLEX VIRUS INFECTION: ICD-10-CM

## 2021-02-23 DIAGNOSIS — J20.9 ACUTE BRONCHITIS, UNSPECIFIED ORGANISM: ICD-10-CM

## 2021-02-23 RX ORDER — CODEINE PHOSPHATE AND GUAIFENESIN 10; 100 MG/5ML; MG/5ML
5 SOLUTION ORAL 2 TIMES DAILY PRN
Qty: 240 ML | Refills: 0 | Status: SHIPPED | OUTPATIENT
Start: 2021-02-23 | End: 2021-09-15

## 2021-02-23 RX ORDER — VALACYCLOVIR HYDROCHLORIDE 1 G/1
TABLET, FILM COATED ORAL
Qty: 8 TABLET | Refills: 11 | Status: SHIPPED | OUTPATIENT
Start: 2021-02-23

## 2021-02-23 ASSESSMENT — PATIENT HEALTH QUESTIONNAIRE - PHQ9: SUM OF ALL RESPONSES TO PHQ QUESTIONS 1-9: 0

## 2021-03-19 ENCOUNTER — COMMUNICATION - HEALTHEAST (OUTPATIENT)
Dept: SCHEDULING | Facility: CLINIC | Age: 36
End: 2021-03-19

## 2021-03-19 ENCOUNTER — AMBULATORY - HEALTHEAST (OUTPATIENT)
Dept: FAMILY MEDICINE | Facility: CLINIC | Age: 36
End: 2021-03-19

## 2021-03-19 ENCOUNTER — AMBULATORY - HEALTHEAST (OUTPATIENT)
Dept: LAB | Facility: CLINIC | Age: 36
End: 2021-03-19

## 2021-03-19 DIAGNOSIS — R50.9 FEVER, UNSPECIFIED FEVER CAUSE: ICD-10-CM

## 2021-03-19 LAB — SARS-COV-2 PCR RESULT-HE - HISTORICAL: NEGATIVE

## 2021-04-26 ENCOUNTER — OFFICE VISIT - HEALTHEAST (OUTPATIENT)
Dept: FAMILY MEDICINE | Facility: CLINIC | Age: 36
End: 2021-04-26

## 2021-04-26 DIAGNOSIS — F51.01 PRIMARY INSOMNIA: ICD-10-CM

## 2021-04-26 DIAGNOSIS — F43.23 ADJUSTMENT DISORDER WITH MIXED ANXIETY AND DEPRESSED MOOD: ICD-10-CM

## 2021-04-26 RX ORDER — ZOLPIDEM TARTRATE 5 MG/1
TABLET ORAL
Qty: 30 TABLET | Refills: 0 | Status: ON HOLD | OUTPATIENT
Start: 2021-04-26 | End: 2022-03-27

## 2021-04-26 RX ORDER — DESVENLAFAXINE 25 MG/1
TABLET, EXTENDED RELEASE ORAL
Status: SHIPPED | COMMUNITY
Start: 2021-04-10 | End: 2021-10-13 | Stop reason: DRUGHIGH

## 2021-04-26 RX ORDER — DESVENLAFAXINE 25 MG/1
25 TABLET, EXTENDED RELEASE ORAL DAILY
Qty: 90 TABLET | Refills: 3 | Status: SHIPPED
Start: 2021-04-26 | End: 2021-09-15

## 2021-05-06 ENCOUNTER — RECORDS - HEALTHEAST (OUTPATIENT)
Dept: FAMILY MEDICINE | Facility: CLINIC | Age: 36
End: 2021-05-06

## 2021-05-26 ASSESSMENT — PATIENT HEALTH QUESTIONNAIRE - PHQ9: SUM OF ALL RESPONSES TO PHQ QUESTIONS 1-9: 0

## 2021-05-27 ASSESSMENT — PATIENT HEALTH QUESTIONNAIRE - PHQ9
SUM OF ALL RESPONSES TO PHQ QUESTIONS 1-9: 9
SUM OF ALL RESPONSES TO PHQ QUESTIONS 1-9: 0
SUM OF ALL RESPONSES TO PHQ QUESTIONS 1-9: 11

## 2021-05-27 NOTE — PROGRESS NOTES
Insertion of IUD  Date/Time: 4/18/2019 10:29 AM  Performed by: Florina Shah MD  Authorized by: Florina Shah MD   Consent: Written consent obtained.  Risks and benefits: risks, benefits and alternatives were discussed  Consent given by: patient  Patient understanding: patient states understanding of the procedure being performed  Patient consent: the patient's understanding of the procedure matches consent given  Required items: required blood products, implants, devices, and special equipment available  Patient identity confirmed: verbally with patient  Preparation: Patient was prepped and draped in the usual sterile fashion.  Local anesthesia used: no    Anesthesia:  Local anesthesia used: no    Sedation:  Patient sedated: no    Patient tolerance: Patient tolerated the procedure well with no immediate complications      Subjective:    HPI: Aspen is a 34 y.o. female presenting to the clinic today for IUD insertion. She has previously tried NuvaRing, Nexplanon, and Mirena, but experienced break-through bleeding with these. She has used oral contraceptives and felt these worked well, but she does not like taking a daily pill.     Remainder of 12-point ROS is negative.    PFSH: Her  does not have paternity leave.    Objective:  PHYSICAL EXAM:  GENERAL APPEARANCE: Alert, cooperative, no distress, appears stated age  HEAD: Normocephalic, without obvious abnormality, atraumatic  PELVIC: Normally developed genitalia with no external lesions or eruptions. Vagina and cervix show no lesions. No rectocele.  Mild cystocele.Uterus retroverted.  No adnexal mass or tenderness.  NEUROLOGIC: CNII-XII intact.     ASSESSMENT/PLAN:  Cervix exposed and cleansed using betadine.  Cervix grasped using tenaculum.  Uterus sounded to 7.5 cm.   No bleeding.  ParaGard IUD inserted without complication, string length 3 inches.   Will do string check at home.    IUD: ParaGard  Lot #: [id number]  Exp: July 2024    ADDITIONAL  HISTORY SUMMARIZED (2): None.  DECISION TO OBTAIN EXTRA INFORMATION (1): None.   RADIOLOGY TESTS (1): None.  LABS (1): UPT ordered, negative.  MEDICINE TESTS (1): None.  INDEPENDENT REVIEW (2 each): None.     The visit lasted a total of 22 minutes face to face with the patient. Over 50% of the time was spent counseling and educating the patient about IUD insertion and care.    IJenna, am scribing for and in the presence of, Dr. Shah.    I, Dr. Shha, personally performed the services described in this documentation, as scribed by Jenna Interiano in my presence, and it is both accurate and complete.    Dragon dictation was used for this note.  Speech recognition errors are a possibility.    Total Data Points: 1

## 2021-05-28 ASSESSMENT — ANXIETY QUESTIONNAIRES
GAD7 TOTAL SCORE: 19
GAD7 TOTAL SCORE: 14

## 2021-05-28 NOTE — PROGRESS NOTES
Assessment:        Aspen Penn is a 34 y.o. female here for postpartum exam at 6 weeks postpartum. Pap smear done at today's visit. No diagnosis found.. .    Plan:        1. Postpartum doing well  2. Contraception: IUD Paraguard  3. No follow-ups on file.      Subjective:       Aspen Penn is a 34 y.o. female who presents for a postpartum visit. She is 6 weeks postpartum following a spontaneous vaginal delivery. I have fully reviewed the prenatal and intrapartum course. The delivery was at 38 6/7 gestational weeks. Outcome: spontaneous vaginal delivery. Postpartum course has been normal . Baby's course has been smooth. Baby is feeding by breast. Bled for  3-4 weeks postpartum.  Currently bleeding  staining only. Bowel function is normal. Bladder function is normal. Patient has resumed intercourse. Contraception method is IUD. Paraguard. Postpartum depression screening score:       The following portions of the patient's history were reviewed and updated as appropriate: allergies, current medications and problem list    Review of Systems  General:  Denies problem  Eyes: Denies problem  Ears/Nose/Throat: Denies problem  Cardiovascular: Denies problem  Respiratory:  Denies problem  Gastrointestinal:  Denies problem, Genitourinary: Denies problem  Musculoskeletal:  Denies problem  Skin: Denies problem  Neurologic: Denies problem  Psychiatric: Denies problem  Endocrine: Denies problem  Heme/Lymphatic: Denies problem   Allergic/Immunologic: Denies problem          Objective:         Vitals:    04/22/19 1502   BP: 110/66   Pulse: (!) 59   SpO2: 97%   Weight: 156 lb 6.4 oz (70.9 kg)       Physical Exam:  General Appearance: Alert, cooperative, no distress, appears stated age  Head: Normocephalic, without obvious abnormality, atraumatic  Eyes: PERRL, conjunctiva/corneas clear, EOM's intact  Ears: Normal TM's and external ear canals, both ears  Nose: Nares normal, septum midline,mucosa normal, no  drainage  Throat: Lips, mucosa, and tongue normal; teeth and gums normal  Neck: Supple, symmetrical, trachea midline, no adenopathy;  thyroid: not enlarged, symmetric, no tenderness/mass/nodules; no carotid bruit or JVD  Back: Symmetric, no curvature, ROM normal, no CVA tenderness  Lungs: Clear to auscultation bilaterally, respirations unlabored  Breasts: No breast masses, tenderness, asymmetry, or nipple discharge.  Heart: Regular rate and rhythm, S1 and S2 normal, no murmur, rub, or gallop, Abdomen: Soft, non-tender, bowel sounds active all four quadrants,  no masses, no organomegaly  Pelvic:Normally developed genitalia with no external lesions or eruptions. Vagina and cervix show no lesions, inflammation, discharge or tenderness. No cystocele, No rectocele. Uterus nontender; retroflexed; IUD string seen.  No adnexal mass or tenderness.    Extremities: Extremities normal, atraumatic, no cyanosis or edema  Skin: Skin color, texture, turgor normal, no rashes or lesions  Lymph nodes: Cervical, supraclavicular, and axillary nodes normal  Neurologic: Normal

## 2021-05-30 NOTE — PROGRESS NOTES
Chief complaint: Insomnia    HPI: This patient has had problems off and on with insomnia and typically has pretty good luck with melatonin however she has used low-dose Ambien very infrequently.  She had a bottle of 90 pills that she originally got in 2014 and she still has a couple of tablets left.  She is interested in having a limited number of Ambien to use when she gets into a cycle of not sleeping well for several nights in a row and then she will take the Ambien to make sure she can get good sleep for couple of nights and reset her sleep hygiene.  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    She has some psychosocial stressors which she is working through with her  and she thinks that overall that is going well    Objective:/73 (Patient Site: Right Arm, Patient Position: Sitting, Cuff Size: Adult Regular)   Pulse 71   Wt 147 lb 9.6 oz (67 kg)   SpO2 97%   BMI 27.00 kg/m        Assessment: Intermittent insomnia    Plan: I think that she has things well in hand and has a plan that she has for herself that works well.  Since she is only asking for a limited number of Ambien I think doing 30 pills is fine and I will not do a controlled substance agreement because she does not need them on a regular basis and when she comes in next year for her annual visit we will reissue the medication so we do not have to do a contract at all she was very comfortable with this plan.

## 2021-05-31 VITALS — WEIGHT: 146 LBS | HEIGHT: 62 IN | BODY MASS INDEX: 26.87 KG/M2

## 2021-06-01 VITALS — WEIGHT: 149.3 LBS | BODY MASS INDEX: 27.31 KG/M2

## 2021-06-01 VITALS — BODY MASS INDEX: 27.07 KG/M2 | WEIGHT: 148 LBS

## 2021-06-02 VITALS — WEIGHT: 176.4 LBS | BODY MASS INDEX: 32.26 KG/M2

## 2021-06-02 VITALS — BODY MASS INDEX: 29.89 KG/M2 | WEIGHT: 163.4 LBS

## 2021-06-02 VITALS — BODY MASS INDEX: 28.26 KG/M2 | WEIGHT: 154.5 LBS

## 2021-06-02 VITALS — WEIGHT: 147.8 LBS | BODY MASS INDEX: 27.03 KG/M2

## 2021-06-02 VITALS — WEIGHT: 172.9 LBS | BODY MASS INDEX: 31.62 KG/M2

## 2021-06-02 VITALS — BODY MASS INDEX: 31.39 KG/M2 | WEIGHT: 171.6 LBS

## 2021-06-02 VITALS — BODY MASS INDEX: 30.62 KG/M2 | WEIGHT: 167.4 LBS

## 2021-06-02 VITALS — WEIGHT: 164.3 LBS | BODY MASS INDEX: 30.05 KG/M2

## 2021-06-02 VITALS — WEIGHT: 156 LBS | BODY MASS INDEX: 28.53 KG/M2

## 2021-06-02 VITALS — BODY MASS INDEX: 30.33 KG/M2 | WEIGHT: 165.8 LBS

## 2021-06-02 VITALS — BODY MASS INDEX: 31.64 KG/M2 | WEIGHT: 173 LBS

## 2021-06-03 VITALS — WEIGHT: 156.4 LBS | BODY MASS INDEX: 28.61 KG/M2

## 2021-06-03 VITALS — BODY MASS INDEX: 27 KG/M2 | WEIGHT: 147.6 LBS

## 2021-06-04 VITALS
BODY MASS INDEX: 25.58 KG/M2 | WEIGHT: 139 LBS | OXYGEN SATURATION: 98 % | HEIGHT: 62 IN | DIASTOLIC BLOOD PRESSURE: 62 MMHG | SYSTOLIC BLOOD PRESSURE: 100 MMHG | HEART RATE: 69 BPM

## 2021-06-04 VITALS
SYSTOLIC BLOOD PRESSURE: 104 MMHG | BODY MASS INDEX: 26.52 KG/M2 | WEIGHT: 145 LBS | DIASTOLIC BLOOD PRESSURE: 60 MMHG | OXYGEN SATURATION: 98 % | HEART RATE: 75 BPM

## 2021-06-04 VITALS — DIASTOLIC BLOOD PRESSURE: 62 MMHG | WEIGHT: 147.1 LBS | SYSTOLIC BLOOD PRESSURE: 116 MMHG | BODY MASS INDEX: 26.9 KG/M2

## 2021-06-04 VITALS
SYSTOLIC BLOOD PRESSURE: 118 MMHG | HEART RATE: 60 BPM | OXYGEN SATURATION: 100 % | WEIGHT: 146 LBS | BODY MASS INDEX: 26.7 KG/M2 | RESPIRATION RATE: 14 BRPM | DIASTOLIC BLOOD PRESSURE: 74 MMHG | TEMPERATURE: 98.1 F

## 2021-06-04 NOTE — PROGRESS NOTES
Assessment and Plan:     1. Yeast vaginitis  Will treat with Diflucan.  Educated on its indications and side effects. Discussed symptomatic treatment. She will follow-up with her PCP if symptoms persist or worsen.  She is content with the plan.   - fluconazole (DIFLUCAN) 150 MG tablet; Take 1 tablet (150 mg total) by mouth once for 1 dose.  Dispense: 1 tablet; Refill: 3    2. Vaginal discharge  - Wet Prep, Vaginal        Subjective:     Aspen is a 34 y.o. female presenting to the clinic for concerns for ongoing vaginal discharge.  Patient had a copper IUD placed 6 weeks after delivering her son on 3/8/19.  She has been experiencing constant vaginal discharge for multiple months.  She had a wet prep performed on 11/7/2019 which was normal.  She is received treatment with metronidazole with no relief.  She has no concerns for STDs.  She occasionally has some mild pruritus.  She denies pain with sexual intercourse.  She has not noticed an odor to the vaginal discharge.    Review of Systems: A complete 14 point review of systems was obtained and is negative or as stated in the history of present illness.    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     Employer: [employer]     Comment: [employer]   Social Needs     Financial resource strain: Not on file     Food insecurity:     Worry: Not on file     Inability: Not on file     Transportation needs:     Medical: Not on file     Non-medical: Not on file   Tobacco Use     Smoking status: Never Smoker     Smokeless tobacco: Never Used   Substance and Sexual Activity     Alcohol use: Yes     Alcohol/week: 4.0 - 5.0 standard drinks     Types: 4 - 5 Cans of beer per week     Drug use: No     Sexual activity: Yes     Partners: Male     Birth  "control/protection: OCP   Lifestyle     Physical activity:     Days per week: Not on file     Minutes per session: Not on file     Stress: Not on file   Relationships     Social connections:     Talks on phone: Not on file     Gets together: Not on file     Attends Restorationist service: Not on file     Active member of club or organization: Not on file     Attends meetings of clubs or organizations: Not on file     Relationship status: Not on file     Intimate partner violence:     Fear of current or ex partner: Not on file     Emotionally abused: Not on file     Physically abused: Not on file     Forced sexual activity: Not on file   Other Topics Concern     Not on file   Social History Narrative     Not on file       Active Ambulatory Problems     Diagnosis Date Noted     Female stress incontinence 2018     Encounter for IUD insertion 2019     Resolved Ambulatory Problems     Diagnosis Date Noted     Pregnancy test positive 2018     Pregnant 2019      (normal spontaneous vaginal delivery) 2019     Past Medical History:   Diagnosis Date     Abnormal Pap smear of cervix      HPV (human papilloma virus) infection      Insomnia        Family History   Problem Relation Age of Onset     Hypothyroidism Mother      No Medical Problems Father      No Medical Problems Sister      No Medical Problems Brother      Endometrial cancer Maternal Grandmother      Hypothyroidism Maternal Grandmother      Coronary artery disease Maternal Grandmother      Heart disease Maternal Grandfather      Cancer Paternal Grandmother        Objective:     /62   Pulse 69   Ht 5' 2\" (1.575 m)   Wt 139 lb (63 kg)   LMP 2019   SpO2 98%   BMI 25.42 kg/m      Patient is alert, in no obvious distress.   Skin: Warm, dry.    :  External genitalia normal.  Normal vaginal mucosa. Large amount white thin discharge is present.  No odor is noticeable.  Cervix no lesions or cervical motion tenderness. IUD " string is present.      LABORATORY: Wet prep ordered and is positive for yeast.

## 2021-06-05 VITALS
WEIGHT: 146.8 LBS | BODY MASS INDEX: 27.02 KG/M2 | DIASTOLIC BLOOD PRESSURE: 62 MMHG | SYSTOLIC BLOOD PRESSURE: 110 MMHG | HEART RATE: 78 BPM | HEIGHT: 62 IN

## 2021-06-05 VITALS
HEART RATE: 75 BPM | WEIGHT: 141.3 LBS | SYSTOLIC BLOOD PRESSURE: 110 MMHG | OXYGEN SATURATION: 98 % | BODY MASS INDEX: 25.84 KG/M2 | DIASTOLIC BLOOD PRESSURE: 78 MMHG

## 2021-06-06 NOTE — PROGRESS NOTES
Assessment and Plan:     1. Encounter for IUD removal       Patient tolerated the procedure well.  She does have her menstrual period, so she may experience more vaginal bleeding.  Discussed symptomatic treatment of any cramping with over-the-counter acetaminophen or ibuprofen.  She plans on resuming an oral contraceptive for birth control.  She is content with the plan.    Subjective:     Aspen is a 34 y.o. female presenting to the clinic for IUD removal.  Patient had her IUD placed in mid April approximately 6 weeks after having her son vaginally.  Since then, she has had heavier periods that last for 8 to 10 days.  She currently has her menstrual period.  She has also had numerous vaginal infections including bacterial vaginosis and yeast vaginitis.  Patient plans on resuming an oral contraceptive.  She denies any personal or family history of blood clots.  She has no concerns for STDs.  She is .    Review of Systems: A complete 14 point review of systems was obtained and is negative or as stated in the history of present illness.    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     Employer: [employer]     Comment: [employer]   Social Needs     Financial resource strain: Not on file     Food insecurity:     Worry: Not on file     Inability: Not on file     Transportation needs:     Medical: Not on file     Non-medical: Not on file   Tobacco Use     Smoking status: Never Smoker     Smokeless tobacco: Never Used   Substance and Sexual Activity     Alcohol use: Yes     Alcohol/week: 4.0 - 5.0 standard drinks     Types: 4 - 5 Cans of beer per week     Drug use: No     Sexual activity: Yes     Partners: Male     Birth control/protection: OCP   Lifestyle     Physical activity:     Days  per week: Not on file     Minutes per session: Not on file     Stress: Not on file   Relationships     Social connections:     Talks on phone: Not on file     Gets together: Not on file     Attends [employer] service: Not on file     Active member of club or organization: Not on file     Attends meetings of clubs or organizations: Not on file     Relationship status: Not on file     Intimate partner violence:     Fear of current or ex partner: Not on file     Emotionally abused: Not on file     Physically abused: Not on file     Forced sexual activity: Not on file   Other Topics Concern     Not on file   Social History Narrative     Not on file       Active Ambulatory Problems     Diagnosis Date Noted     Female stress incontinence 2018     Encounter for IUD insertion 2019     Resolved Ambulatory Problems     Diagnosis Date Noted     Pregnancy test positive 2018     Pregnant 2019      (normal spontaneous vaginal delivery) 2019     Past Medical History:   Diagnosis Date     Abnormal Pap smear of cervix      HPV (human papilloma virus) infection      Insomnia        Family History   Problem Relation Age of Onset     Hypothyroidism Mother      No Medical Problems Father      No Medical Problems Sister      No Medical Problems Brother      Endometrial cancer Maternal Grandmother      Hypothyroidism Maternal Grandmother      Coronary artery disease Maternal Grandmother      Heart disease Maternal Grandfather      Cancer Paternal Grandmother        Objective:     /60 (Patient Site: Right Arm, Cuff Size: Adult Regular)   Pulse 75   Wt 145 lb (65.8 kg)   SpO2 98%   BMI 26.52 kg/m      Patient is alert, in no obvious distress.   Skin: Warm, dry.    Lungs:  Clear to auscultation. Respirations even and unlabored.  No wheezing or rales noted.   Heart:  Regular rate and rhythm.  No murmurs, S3, S4, gallops, or rubs.    Abdomen: Soft, nontender.  No organomegaly. Bowel sounds  normoactive. No guarding or masses noted.   :  External genitalia normal.  Normal vaginal mucosa.  IUD strings were located protruding from the cervix.  I used a long forceps to remove the IUD.  There was some resistance initially, but the IUD was removed without difficulty.  Minima bleeding was present.

## 2021-06-06 NOTE — TELEPHONE ENCOUNTER
RN cannot approve Refill Request    RN can NOT refill this medication med is not covered by policy/route to provider     . Last office visit: 7/10/2019 Nanda Rodriguez MD Last Physical: 10/20/2017 Last MTM visit: Visit date not found Last visit same specialty: 2/21/2020 Jazmine Piper CNP.  Next visit within 3 mo: Visit date not found  Next physical within 3 mo: Visit date not found      Matilda Small, [facility] Triage/Med Refill 2/24/2020    Requested Prescriptions   Pending Prescriptions Disp Refills     DAYSEE 0.15 mg-30 mcg (84)/10 mcg (7) 3MPk [Pharmacy Med Name: Daysee Oral Tablet 0.15-0.03 &0.01 MG] 91 tablet 3     Sig: Take 1 tablet by mouth daily.       There is no refill protocol information for this order

## 2021-06-06 NOTE — PROGRESS NOTES
Chief complaint: Neck pain    HPI: She has had pain when she looks up for as long as she can remember and never really thought anything about it until she mentioned it to someone who said that that is not normal so she decided to have that evaluated.  She has no dysesthesias no mechanical symptoms with her upper extremities.  This does seem to be mechanical issue with her neck and she is not had a neck injury.    Objective:/62   Wt 147 lb 1.6 oz (66.7 kg)   BMI 26.90 kg/m    She is in no distress and she has tenderness to palpation probably at the C7 or C8 spinous process where it meets the T1 process when she is looking up with her head in full extension.  If she looks straight ahead it seems as though there is a predominant spinous process    Assessment: Neck pain with extension    Plan: I actually want to get C-spine x-rays with a extension and flexion to see if she has a little bit of spondylolisthesis and I will order some physical therapy until we get the formal read back from the radiologist.  We may need to do further evaluation.

## 2021-06-07 NOTE — PROGRESS NOTES
Aspen Penn is a 35 y.o. female who is being seen via a billable video visit.  Patient has given verbal consent for video visit? Yes  Video Start Time: 10:03AM  Telehealth Visit Details:  Type of service: Telehealth  Video End Time (time video stopped): 10:28AM  Originating Location (Patient): Home  Additional Participants in Telehealth Visit: none  Distant Location (Provider Location): [facility]  Mode of Communication: Audio Visual    Marilee Carrington, PT  2020   [facility] Daily Progress     Patient Name: Aspen Penn  Visit Date: 2020   Start of Care:   Visit #: 2  Referral Diagnosis: [unfilled]  Referring provider: Nanda Rodriguez MD  Visit Diagnosis:     ICD-10-CM    1. Neck pain  M54.2    2. Acute right-sided low back pain without sciatica  M54.5          Assessment:   Patient is presenting for follow-up of pain with cervical extension and history or R LB locking episode. She is noting good toleration for HEP and reviewed all home exercises this visit. Patient is progressing as planned.  PT will include future LE strengthening for knee stability.    HEP/POC compliance is  good .  Patient demonstrates understanding/independence with home program.    Goal Status:  Pt. will be independent with home exercise program in : 4 weeks    Pt will: note reduction in occurence of neck pain when looking up by 50% in 8 weeks  Pt will: not have any episodes of lowback locking in 8 weeks      Plan / Patient Education:     Continue with initial plan of care.  Progress with home program as tolerated.    Plan for next visit: Chin tuck head lift, SLS  Subjective:     Pain Ratin  Patient has been doing very well and likes the home exercises that she has been given. She has had one episode of some soreness but it wasn't the previous locking episode that she had.  She also asks about including some knee stability exercises into her long-term  care.      Objective:     She has verbal reports of medial-inferior knee mild soreness and associated weakness. PT to include knee stability into long-term LE pelvic strengthneing.     Treatment Today     Exercises: see flow sheet  Exercise #1: Seated 1) cervical flexion stretch 2 x 20 seconds 2)cervical extenison isometric x 10 hold 5 seconds, cerbally reviewed both and educated on the purpose of isometrics  Comment #1: supine dead bug x 45 seconds, educated on LE extension as a progression  Exercise #2: Bridge x 5, verified position and cued on increasing reps, adding resistance or hold time  Comment #2: Plank x 45 seconds, educated on LE hip extension as a progression and patient demonstrated  Exercise #3: LTR x 10 hold 5 seconds, verbally reviewed  Comment #3: Clamshell x 10 B  Exercise #4: supine chin tuck x 2 lower slowly      TREATMENT MINUTES COMMENTS   Evaluation     Self-care/ Home management     Manual therapy     Neuromuscular Re-education     Therapeutic Activity     Therapeutic Exercises 25 See flow sheet   Gait training     Modality__________________                Total 25    Blank areas are intentional and mean the treatment did not include these items.       Marilee Carrington  5/1/2020

## 2021-06-07 NOTE — TELEPHONE ENCOUNTER
Order pended if appropriate.    Last appointment with PCP: 4/16/20  Upcoming appointments: None  Last refill on medication: 4/24/2020, Dispense of 30, Refill of 2    Order pended for 20mg is appropriate.

## 2021-06-07 NOTE — TELEPHONE ENCOUNTER
Medication Question or Clarification  Who is calling: [facility] Pharmacist  What medication are you calling about (include dose and sig)?: Citalopram 10 mg, take 1/2 to 1 tablet daily  Who prescribed the medication?: Prescription was refill by Dr. Hameed  What is your question/concern?: On 4/16/20, the patient discussed with Dr. Rodriguez an increased in her citalopram dose to 20 mg daily.  Patient would like to proceed with this.  Please send in a new order.  Requested Pharmacy: [id number]  Okay to leave a detailed message?: No

## 2021-06-07 NOTE — PROGRESS NOTES
"Aspen Penn is a 35 y.o. female who is being evaluated via a billable telephone visit.      The patient has been notified of following:    \"This telephone visit will be conducted via a call between you and your physician/provider. We have found that certain health care needs can be provided without the need for a physical exam.  This service lets us provide the care you need with a short phone conversation.  If a prescription is necessary we can send it directly to your pharmacy.  If lab work is needed we can place an order for that and you can then stop by our lab to have the test done at a later time.    If during the course of the call the physician/provider feels a telephone visit is not appropriate, you will not be charged for this service.\"         Aspen Penn complains of anxiety     She is having a lot of trouble with some anxiety.  Birth control pills do not sit very well with her so in discussion we think that probably the birth control pill is contributing to some of her anxiety and depression.    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    She had access to a PHQ 9 and she got a score of 11 and she had a ANDREA 7 and got a score of 19.  She is having lots of trouble sleeping and there are many nights where she is crying herself to sleep.  She has access to Ambien and uses it very sparingly and does not want to have to use that regularly.  The Unisom did not really help very much when she was pregnant and she does not want to use trazodone because it is too sedating.  She has a lot of stress from work and from family members in their 80s were at very high risk of Nereida 19.  She is using melatonin to help her sleep and a half of an Ambien about once a week.  She has not been on any medication but is thinking that may need to be on some sort of an " "antidepressant just to manage this point in time.    We talked about doing a very low dose of citalopram I would try to do one half of a 10 mg tablet for a week or 2 and then possibly go up to 10 mg a day and she will take it in the evening could that should help with that sedation for side effects.    Because the birth control pill might be contributing to her moods, I have suggested that she and her  use condoms for birth control and she stopped the pill because I think that would really help a lot.    We discussed other ways that she can help using her skills as a physician such as being part of a call pool that is going to likely go in-house at [facility] to backup the obstetricians.  If she has an option to work urgent care where she is not dealing with someone with a respiratory illness, she may also look at doing that.  Just having a plan in place was very helpful for her.    When she was pregnant she had SI dysfunction and went to a couple visits with our physical therapist.  She has not been doing those exercises regularly but did have an episode where she was just bending down to  something and her SI joint got out of place again.  I have suggested that she try to have like 1 refresher visit with a physical therapist to see if she can get back on track and start doing her exercises daily.  She was asking about a refill on some Flexeril muscle relaxer for the times these happened and I think that that is not at all unreasonable.    She will call her physical therapist and see if she needs another order just for a one-time visit to get restarted on her home program  We will do citalopram 10 mg tablets 1/2 to 1 tablet daily as directed  She will let me know how she is doing in about 6 weeks so we can make sure that we have not cause any harm and that in fact we are helping.        \"This telephone visit will be conducted via a call between you and your physician/provider. We have " "found that certain health care needs can be provided without the need for a physical exam.  This service lets us provide the care you need with a short phone conversation.  If a prescription is necessary we can send it directly to your pharmacy.  If lab work is needed we can place an order for that and you can then stop by our lab to have the test done at a later time.    If during the course of the call the physician/provider feels a telephone visit is not appropriate, you will not be charged for this service.\"        I have reviewed and updated the patient's Past Medical History, Social History, Family History and Medication List.    ALLERGIES  Patient has no known allergies.    Additional provider notes:      Assessment/Plan:  Anxiety with depressed mood  Medication management  Psychosocial stressors  Contraceptive management  SI dysfunction  Sleep disturbance      Phone call duration:  20 minutes    Rafy Spence CMA        "

## 2021-06-07 NOTE — PROGRESS NOTES
SUBJECTIVE: Here for curbside evaluation for COVID-19 referred through [facility]. Confirmed [facility] with name and date of birth for specimen collection.         1. Cough       Instructed patient to follow up with [facility] for return to work process. If questions or concerns arise advised to follow up with [facility] team.     Leonor Meraz

## 2021-06-07 NOTE — PROGRESS NOTES
Aspen Penn is a 35 y.o. female who is being seen via a billable video visit.  Patient has given verbal consent for video visit? Yes  Video Start Time: 12:00PM  Telehealth Visit Details:  Type of service: Telehealth  Video End Time (time video stopped): 12:41PM  Originating Location (Patient): Home  Additional Participants in Telehealth Visit: None  Distant Location (Provider [facility]): [facility]  Mode of Communication: Audio Visual    Marilee Carrington, PT  4/20/2020  [facility]   Cervical Thoracic and Lumbar Initial Evaluation    Patient Name: Aspen Penn  Date of evaluation: 4/20/2020  Referral Diagnosis: Neck pain  Referring provider: Nanda Rodriguez MD  Visit Diagnosis:     ICD-10-CM    1. Neck pain  M54.2    2. Acute right-sided low back pain without sciatica  M54.5        Assessment:    Patient is presenting with recent history of R> L SI/lumbar pain with a recent locking episode three weeks ago when turning to the R. She notes no previous history of injury but some SI difficulty when pregnant last year. She also noting neck pain in cervical extension and would like to gain education on possible exercises. PT exam reveals good flexibility throughout neck and LB and negative radicular symptoms. Patient to initiate home exercises and PT will progress as tolerated. Patient very much in agreement with the POC.     Pt. is appropriate for skilled PT intervention as outlined in the Plan of Care (POC).  Pt. is a good candidate for skilled PT services to improve pain levels and function.    Goals:  Pt. will be independent with home exercise program in : 4 weeks    Pt will: note reduction in occurence of neck pain when looking up by 50% in 8 weeks  Pt will: not have any episodes of lowback locking in 8 weeks      Patient's expectations/goals are realistic.    Barriers to Learning or Achieving Goals:  No Barriers.       Plan / Patient Instructions:         Plan of Care:   Communication with: Referral Source  Patient Related Instruction: Nature of Condition;Treatment plan and rationale;Posture  Times per Week: 1  Number of Weeks: 12  Number of Visits: 12  Discharge Planning: to independent home program and self-care  Therapeutic Exercise: ROM;Stretching;Strengthening  Neuromuscular Reeducation: core  Manual Therapy: other;soft tissue mobilization;myofascial release;joint mobilization;muscle energy  Manual Therapy: if needed  Modalities: other;TENS  Modalities: if needed      Plan for next visit: Chin tuck head lift, clamshell, follow-up of cracking in hip       Subjective:         History of Present Illness:    Aspen is a 35 y.o. female who presents to therapy today with complaints of an episode of lumbar locking 3 weeks ago when twisting to the R. She has not had a locking episode like this previously. No new injury or trauma. Previous she also saw this therapist for some SI pain during pregnancy. She was feeling better when she did the exercises but now has not been as compliant with them.    Of lesser note, she has minimal pain with cervical extension at end range, 1/10 pain. Pain does not longer. Negative history of injury.     No HA's, no difficulty swallowing.     She has some bands, foam roll.    Negative red flags.     Exercise: some biking recently.    Pain Ratin  Pain rating at best: 0    Pain description: pain    Functional limitations are described as occurring with:   twisting or turning trunk    Patient reports benefit from:  movement or exercise          Objective:      Note: Items left blank indicates the item was not performed or not indicated at the time of the evaluation.    Patient Outcome Measures :    Not completed due video visit    Cervical Thoracic Examination  1. Neck pain     2. Acute right-sided low back pain without sciatica       Involved side: Bilateral and maybe more than R  Posture Observation:      General sitting posture is   normal.    Cervical ROM:    Date: 4/20/20     *Indicate scale AROM AROM AROM   Cervical Flexion Chin close to chest but tighter than extension     Cervical Extension Min loss, nose parallel to ground      Right Left Right Left Right Left   Cervical Sidebending Min loss, some tightneing Min loss, some tighenting       Cervical Rotation No loss No loss       Lumbar flexion 0 cm finger tip to floor     Lumbar extension Min loss, no pain     Lumbar extension with rotation Mild B     Lumbar sidebedning R (past knee)  L to knee     Thoracic Sidebending         Thoracic Rotation           Strength   Good functional strength in ankles, B SLS 30 seconds but mild trendlenberg on L    Sensation  Verbally no  reports    Reflex Testing  Cervical Dermatomes Right Left UE Reflexes Right Left   Back of the Head (C2)   Biceps (C5-6)     Supraclavicular Fossa (C3)   Brachioradialis (C5-6)     AC Joint (C4)   Triceps (C7-8)     Lateral Biceps (C5)   Ger s test     Palmar Thumb (C6)   LE Reflexes     Palmar 3rd Finger (C7)   Patellar (L3-4)     Palmar 5th Finger (C8)   Achilles (S1-2)     Ulnar Forearm (T1)   Babinski Response         Flexibility:    Palpation: Mild tension on the R LB , but feels good when worked on      Cervical Special Tests- Not needed  Lumbar special tests: Negative slump        Treatment Today      Exercise #1: Seated 1) cervical flexion stretch 2 x 20 seconds 2)cervical extenison isometric x 10 hold 5 seconds  Comment #1: supine dead bug 3 x 20 seconds  Exercise #2: Bridge x 10  Comment #2: Plank 3 x 20 seconds  Exercise #3: LTR x 10 hold 5 seconds    Self-care and home management:  - advised on POC and goals for the exercises.    TREATMENT MINUTES COMMENTS   Evaluation 15    Self-care/ Home management     Manual therapy     Neuromuscular Re-education     Therapeutic Activity     Therapeutic Exercises 25 See flow sheet   Gait training     Modality__________________                Total 40    Blank areas are  intentional and mean the treatment did not include these items.     PT Evaluation Code: (Please list factors)  Patient History/Comorbidities:   Patient Active Problem List   Diagnosis     Female stress incontinence       Examination: cervical and lumbar  Clinical Presentation: stable  Clinical Decision Making: low    Patient History/  Comorbidities Examination  (body structures and functions, activity limitations, and/or participation restrictions) Clinical Presentation Clinical Decision Making (Complexity)   No documented Comorbidities or personal factors 1-2 Elements Stable and/or uncomplicated Low   1-2 documented comorbidities or personal factor 3 Elements Evolving clinical presentation with changing characteristics Moderate   3-4 documented comorbidities or personal factors 4 or more Unstable and unpredictable High                Marilee Carrington  4/20/2020  11:26 AM

## 2021-06-07 NOTE — TELEPHONE ENCOUNTER
RN cannot approve Refill Request    RN can NOT refill this medication Protocol failed and NO refill given       Matilda Small, [facility] Triage/Med Refill 4/23/2020    Requested Prescriptions   Pending Prescriptions Disp Refills     valACYclovir (VALTREX) 1000 MG tablet [Pharmacy Med Name: valACYclovir HCl Oral Tablet 1 GM] 8 tablet 11     Sig: TAKE TWO TABLETS BY MOUTH TWICE DAILY FOR 2 DAYS       Antivirals Refill Protocol Failed - 4/23/2020 12:53 PM        Failed - Renal function done in last year     No results found for: CREATININE, CREATININE          Passed - Visit with PCP or prescribing provider visit in past 12 months or next 3 months     Last office visit with prescriber/PCP: 3/11/2020 Nanda Rodriguez MD OR same dept: Visit date not found OR same specialty: 3/11/2020 Nanda Rodriguez MD  Last physical: 10/20/2017 Last MTM visit: Visit date not found   Next visit within 3 mo: Visit date not found  Next physical within 3 mo: Visit date not found  Prescriber OR PCP: Nanda Rodriguez MD  Last diagnosis associated with med order: There are no diagnoses linked to this encounter.  If protocol passes may refill for 12 months if within 3 months of last provider visit (or a total of 15 months).             Passed - Patient does not have active pregnancy episode        Passed - Patient has not had positive pregnancy test in last 280 days     Pregnancy Test, Urine   Date Value Ref Range Status   04/18/2019 Negative Negative Final     Beta-hCG, Quantitative   Date Value Ref Range Status   07/25/2018 65,318 (H) 0 - 4 mlU/mL Final

## 2021-06-07 NOTE — PROGRESS NOTES
"Aspen Penn is a 35 y.o. female who is being evaluated via a billable video visit.      The patient has been notified of following:     \"This video visit will be conducted via a call between you and your physician/provider. We have found that certain health care needs can be provided without the need for an in-person physical exam.  This service lets us provide the care you need with a video conversation.  If a prescription is necessary we can send it directly to your pharmacy.  If lab work is needed we can place an order for that and you can then stop by our lab to have the test done at a later time.    Video visits are billed at different rates depending on your insurance coverage. Please reach out to your insurance provider with any questions.    If during the course of the call the physician/provider feels a video visit is not appropriate, you will not be charged for this service.\"    Patient has given verbal consent to a Video visit? Yes    Patient would like to receive their AVS by AVS Preference: Sudhakar.    Patient would like the video invitation sent by: Text to cell phone: [phone]      Video Start Time: 3:35    Additional provider notes: She has been feeling a little bit better on the 10 mg of citalopram sleep is okay sometimes she still needs to use a little bit of melatonin but she is having some apathy there are some psychosocial stressors at work so does not know how much of that is related to the psychosocial stressors and how much of that is related to her social state and thinks that she could easily go up to 20 mg of the citalopram.  There is nothing about it that she does not like.  She did not want to try to go to 50 mg she wanted to make the jump from 10-20 I think that is reasonable.    She has an appropriate affect and insight so I think that this is a good idea its been an appropriate amount of time to see if it is helping and it is helping somewhat but think it could be even " better so we will increase to 20 mg a day and touch base again in about 6 weeks and certainly sooner if there are questions problems or concerns.      Video-Visit Details    Type of service:  Video Visit    Video End Time (time video stopped): 3:50    Originating Location (pt. Location): Home    Distant Location (provider location):  [facility]/OB     Mode of Communication:  Video Conference via [facility]      Nanda Rodriguez MD

## 2021-06-08 NOTE — PROGRESS NOTES
Aspen Penn is a 35 y.o. female who is being seen via a billable video visit.  Patient has given verbal consent for video visit? Yes  Video Start Time: 10:01AM  Telehealth Visit Details:  Type of service: Telehealth  Video End Time (time video stopped): 10:16AM  Originating Location (Patient): Home  Additional Participants in Telehealth Visit: none  Distant Location (Provider Location): [facility]  Mode of Communication: Audio Visual    Marilee Carrington, PT  2020   [facility] Daily Progress     Patient Name: Aspen Penn  Visit Date: 2020   Start of Care: 20  Visit #: 4  Referral Diagnosis: Sacral and neck pain  Referring provider: Nanda Rodriguez MD  Visit Diagnosis:     ICD-10-CM    1. Neck pain  M54.2    2. Acute right-sided low back pain without sciatica  M54.5    3. Sacral pain  M53.3          Assessment:   Patient is presenting for follow-up of pain with cervical extension and history or R LB locking episode. She has been feeling pretty good per report. We reviewed her squat and balance positions today and they look good. She will try her program independently and contact therapist if questions arise.    HEP/POC compliance is  good .  Patient demonstrates understanding/independence with home program.    Goal Status:  Pt. will be independent with home exercise program in : 4 weeks    Pt will: note reduction in occurence of neck pain when looking up by 50% in 8 weeks  Pt will: not have any episodes of lowback locking in 8 weeks      Plan / Patient Education:     Patient to begin independent home program and self-care.  Subjective:     Pain Ratin  Patient feeling pretty good! Less compliance but feeling good.   Would like me to check her positioning.     Objective:     Deep cervical endurance test: 7 seconds    Squatting mechanics- Good medial lateral tracking but increased anterior translation of the knee    Treatment Today      Exercises: see flow sheet  Exercise #1: Seated 1) cervical flexion stretch 2 x 20 seconds 2)cervical extenison isometric x 10 hold 5 seconds, cerbally reviewed both and educated on the purpose of isometrics  Comment #1: supine dead bug x 45 seconds, educated on LE extension as a progression  Exercise #2: Bridge x 5, verified position and cued on increasing reps, adding resistance or hold time  Comment #2: Plank x 45 seconds, educated on LE hip extension as a progression and patient demonstrated also toe tape to side and knee to ebow  Exercise #3: LTR x 10 hold 5 seconds, verbally reviewed  Comment #3: Clamshell x 10 B  Exercise #4: supine chin tuck x 2 lower slowly, cued to hold longer  Comment #4: freestanding squat x 10, verified positon from front and side  Exercise #5: B SLS x 3 0 seconds, coached on proper position    Self-care and home management:  -elbow rule for racking, shoveling and sweeping. PT demonstrated proper positioning  -educated on golfer pick-up for when picking up son out of pack n play  -educated on proper squatting mechanics.    TREATMENT MINUTES COMMENTS   Evaluation     Self-care/ Home management     Manual therapy     Neuromuscular Re-education     Therapeutic Activity     Therapeutic Exercises 15 See flow sheet  Educated on proper lunge position   Gait training     Modality__________________                Total 15 95 modifier   Blank areas are intentional and mean the treatment did not include these items.       Marilee Carrington  6/12/2020     [facility] Rehabilitation Discharge Summary  Patient Name: Aspen Penn  Date: 1/22/2021  Referral Diagnosis: neck pain  Referring provider: Nanda Rodriguez MD  Visit Diagnosis:   1. Neck pain     2. Acute right-sided low back pain without sciatica     3. Sacral pain         Goals:  Pt. will be independent with home exercise program in : 4 weeks    Pt will: will improve function of B hands in the morning 3 days a week in 8 weeks for  improved functionality  Pt will: improve sleeping position, avoiding stomach 50% of the time for improved sleep habits in 8 weeks      Patient was seen for 4 visits from 4/20/20 to 6/12/20 with 0 missed appointments.  The patient met goals and has demonstrated understanding of and independence in the home program for self-care, and progression to next steps.  She will initiate contact if questions or concerns arise.    Therapy will be discontinued at this time.  The patient will need a new referral to resume.    Thank you for your referral.  Marilee Carrington  1/22/2021  10:53 AM

## 2021-06-08 NOTE — PROGRESS NOTES
Assessment:     1. Abrasion of left cornea, initial encounter  gentamicin (GARAMYCIN) 0.3 % ophthalmic solution          Plan:     Patient with left lateral corneal abrasion it appears to be superficial.  Prescription given for gentamicin ophthalmic drops.  Discussed need for close follow-up if symptoms not improving over the next day or 2 and certainly if symptoms are getting worse in any way.  Recommend avoiding rubbing her eye.     Subjective:       35 y.o. female presents for evaluation of a 3-day history of discomfort in her left lateral eye.  She has not had any vision changes and denies a foreign body sensation.  She does not recall anything getting into her eye.  It is not been red.  She has not had any drainage.  She states it feels like a deep ache on the lateral portion of her eye.  She has not had any eyelid swelling or redness.  No skin rash.  Is not had a fever.  She does not wear contact lenses.    Patient Active Problem List   Diagnosis     Female stress incontinence       Past Medical History:   Diagnosis Date     Abnormal Pap smear of cervix      Encounter for IUD insertion 2019     HPV (human papilloma virus) infection      Insomnia       (normal spontaneous vaginal delivery) 3/8/2019       Past Surgical History:   Procedure Laterality Date     LASIK Bilateral      WISDOM TOOTH EXTRACTION         Current Outpatient Medications on File Prior to Visit   Medication Sig Dispense Refill     citalopram (CELEXA) 20 MG tablet Take 1 tablet (20 mg total) by mouth daily. 1/2-1 tablet po daily as directed 30 tablet 2     cyclobenzaprine (FLEXERIL) 10 MG tablet Take 0.5 tablets (5 mg total) by mouth every 8 (eight) hours as needed for muscle spasms. 30 tablet 1     DAYSEE 0.15 mg-30 mcg (84)/10 mcg (7) 3MPk Take 1 tablet by mouth daily. 91 tablet 3     valACYclovir (VALTREX) 1000 MG tablet TAKE TWO TABLETS BY MOUTH TWICE DAILY FOR 2 DAYS 8 tablet 11     zolpidem (AMBIEN) 5 MG tablet 1/2-1 tablet po  at HS prn sleep 30 tablet 0     No current facility-administered medications on file prior to visit.        No Known Allergies    Family History   Problem Relation Age of Onset     Hypothyroidism Mother      No Medical Problems Father      No Medical Problems Sister      No Medical Problems Brother      Endometrial cancer Maternal Grandmother      Hypothyroidism Maternal Grandmother      Coronary artery disease Maternal Grandmother      Heart disease Maternal Grandfather      Cancer Paternal Grandmother        Social History     Socioeconomic History     Marital status:      Spouse name: Moy     Number of children: None     Years of education: None     Highest education level: None   Occupational History     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     Comment: [place]   Social Needs     Financial resource strain: None     Food insecurity     Worry: None     Inability: None     Transportation needs     Medical: None     Non-medical: None   Tobacco Use     Smoking status: Never Smoker     Smokeless tobacco: Never Used   Substance and Sexual Activity     Alcohol use: Yes     Alcohol/week: 4.0 - 5.0 standard drinks     Types: 4 - 5 Cans of beer per week     Drug use: No     Sexual activity: Yes     Partners: Male     Birth control/protection: OCP   Lifestyle     Physical activity     Days per week: None     Minutes per session: None     Stress: None   Relationships     Social connections     Talks on phone: None     Gets together: None     Attends Lutheran service: None     Active member of club or organization: None     Attends meetings of clubs or organizations: None     Relationship status: None     Intimate partner violence     Fear of current or ex partner: None     Emotionally abused: None     Physically abused: None     Forced sexual activity: None   Other Topics Concern     None   Social History Narrative     None         Review of Systems  A 12 point comprehensive review of  systems was negative except as noted.      Objective:     Vitals:    05/08/20 0937   BP: 118/74   Pulse: 60   Resp: 14   Temp: 98.1  F (36.7  C)   SpO2: 100%      General appearance: alert, appears stated age and cooperative  Eyes: Pupils equal round and reactive to light.  Sclera is anicteric.  There is no conjunctival injection or redness.  Eyelids appear normal.  Extraocular movements are intact.  No foreign body is seen.  Tetracaine applied to the eye and excellent relief of her symptoms was obtained.  Fluorescein stain applied and I examined revealing a superficial corneal abrasion measuring approximately 3 mm x 2 mm in the left lateral lower portion of her cornea at approximately the 4 o'clock position.       This note has been dictated using voice recognition software. Any grammatical or context distortions are unintentional and inherent to the software

## 2021-06-08 NOTE — PROGRESS NOTES
Aspen Penn is a 35 y.o. female who is being seen via a billable video visit.  Patient has given verbal consent for video visit? Yes  Video Start Time: 10:04AM  Telehealth Visit Details:  Type of service: Telehealth  Video End Time (time video stopped): 10:31AM  Originating Location (Patient): Home  Additional Participants in Telehealth Visit: none  Distant Location (Provider Location): [facility]  Mode of Communication: Audio Visual    Marilee Carrington, PT  5/15/2020   [facility] Daily Progress     Patient Name: Aspen Penn  Visit Date: 5/15/2020   Start of Care: 20  Visit #: 3  Referral Diagnosis: Sacral and neck pain  Referring provider: Nanda Rodriguez MD  Visit Diagnosis:     ICD-10-CM    1. Neck pain  M54.2    2. Acute right-sided low back pain without sciatica  M54.5    3. Sacral pain  M53.3          Assessment:   Patient is presenting for follow-up of pain with cervical extension and history or R LB locking episode. She is happy with the progress that she is making with the home exercises, even though she only has occasional pains. We focused on knee stability this visit and worked through the patients' squatting mechanics.    She seems very happy with the plan thus far.     HEP/POC compliance is  good .  Patient demonstrates understanding/independence with home program.    Goal Status:  Pt. will be independent with home exercise program in : 4 weeks    Pt will: note reduction in occurence of neck pain when looking up by 50% in 8 weeks  Pt will: not have any episodes of lowback locking in 8 weeks      Plan / Patient Education:     Continue with initial plan of care.  Progress with home program as tolerated.    Plan for next visit: sidestepping?  Subjective:     Pain Ratin  She needs to continue working on the chi tuck as it is still hard. She is up to 60 seconds on the plank  A little muscular sore from racking out in the  yard.      Objective:     Deep cervical endurance test: 7 seconds    Squatting mechanics- Good medial lateral tracking but increased anterior translation of the knee    Treatment Today     Exercises: see flow sheet  Exercise #1: Seated 1) cervical flexion stretch 2 x 20 seconds 2)cervical extenison isometric x 10 hold 5 seconds, cerbally reviewed both and educated on the purpose of isometrics  Comment #1: supine dead bug x 45 seconds, educated on LE extension as a progression  Exercise #2: Bridge x 5, verified position and cued on increasing reps, adding resistance or hold time  Comment #2: Plank x 45 seconds, educated on LE hip extension as a progression and patient demonstrated also toe tape to side and knee to ebow  Exercise #3: LTR x 10 hold 5 seconds, verbally reviewed  Comment #3: Clamshell x 10 B  Exercise #4: supine chin tuck x 2 lower slowly, cued to hold longer  Comment #4: freestanding squat x 10  Exercise #5: B SLS x 3 0 seconds, coached on proper position    Self-care and home management:  -elbow rule for racking, shoveling and sweeping. PT demonstrated proper positioning  -educated on golfer pick-up for when picking up son out of pack n play  -educated on proper squatting mechanics.    TREATMENT MINUTES COMMENTS   Evaluation     Self-care/ Home management 15    Manual therapy     Neuromuscular Re-education     Therapeutic Activity     Therapeutic Exercises 10 See flow sheet   Gait training     Modality__________________                Total 25 95 modifier   Blank areas are intentional and mean the treatment did not include these items.       Marilee Carrington  5/15/2020

## 2021-06-11 NOTE — PROGRESS NOTES
ASSESSMENT:  1. Routine general medical examination at a health care facility  Currently menstruating, so no pap today    2. Family history of kidney disease     - Lipid Cascade  - Vitamin D, Total (25-Hydroxy)  - Comprehensive Metabolic Panel    3. Screening for condition              PLAN:  There are no Patient Instructions on file for this visit.    Orders Placed This Encounter   Procedures     Influenza, Seasonal Quad, PF =/> 6months     Lipid Cascade     Order Specific Question:   Fasting is required?     Answer:   Yes     Vitamin D, Total (25-Hydroxy)     Comprehensive Metabolic Panel     There are no discontinued medications.    No follow-ups on file.    Health Maintenance Due   Topic Date Due     DEPRESSION ACTION PLAN  1987     PREVENTIVE CARE VISIT  10/20/2018     INFLUENZA VACCINE RULE BASED (1) 08/01/2020       CHIEF COMPLAINT:  Chief Complaint   Patient presents with     Annual Exam       HISTORY OF PRESENT ILLNESS:  Aspen Penn is a 35 y.o. female presenting to the clinic today for a physical exam.     She is here with her [id number] son today.  She is currently menstruating pretty heavily so not do the Pap smear or breast exam today she will come back in about 2 and half weeks for that.  She is fasting so we can do fasting lipids and blood sugar and there is a family history of kidney disease so she is asking that we do renal panel and I think the vitamin D has been low in the past so we will check that.  We did not get a chance to discuss updating her medications which we will do when she comes in for her Pap and breast exam.  She is otherwise doing well    Healthy Habits  Are you taking a daily aspirin? No  Do you typically exercising at least 40 min, 3-4 times per week?  Yes  Do you usually eat at least 4 servings of fruit and vegetables a day, include whole grains and fiber and avoid regularly eating high fat foods? Yes  Have you had an eye exam in the past two years? NO  Do you  see a dentist twice per year? Yes  Do you have any concerns regarding sleep? No  Do you drink caffeine? YES    Safety Screen  If you own firearms, are they secured in a locked gun cabinet or with trigger locks? Yes  REVIEW OF SYSTEMS:   All other systems are negative.    Immunization History   Administered Date(s) Administered     INFLUENZA,SEASONAL QUAD, PF, =/> 6months 2017, 2018, 2020     Tdap 2019       GYNECOLOGIC HISTORY:  Last menstrual period:20  Contraception: None  Last Pap:  Results were: normal, but HPV+  Last mammogram: n/a  Results were: n/a    OB History        1    Para   1    Term   1       0    AB   0    Living   1       SAB   0    TAB   0    Ectopic   0    Multiple   0    Live Births   1                 PFSH:  Kidney disease  Social History     Tobacco Use   Smoking Status Never Smoker   Smokeless Tobacco Never Used     Family History   Problem Relation Age of Onset     Hypothyroidism Mother      No Medical Problems Father      No Medical Problems Sister      No Medical Problems Brother      Endometrial cancer Maternal Grandmother      Hypothyroidism Maternal Grandmother      Coronary artery disease Maternal Grandmother      Heart disease Maternal Grandfather      Cancer Paternal Grandmother      Social History     Socioeconomic History     Marital status:      Spouse name: Moy     Number of children: None     Years of education: None     Highest education level: None   Occupational History     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     Employer: [employer]     Comment: [place]   Social Needs     Financial resource strain: None     Food insecurity     Worry: None     Inability: None     Transportation needs     Medical: None     Non-medical: None   Tobacco Use     Smoking status: Never Smoker     Smokeless tobacco: Never Used   Substance and Sexual Activity     Alcohol use: Yes     Alcohol/week: 4.0 - 5.0 standard drinks     Types: 4 - 5  "Cans of beer per week     Drug use: No     Sexual activity: Yes     Partners: Male     Birth control/protection: OCP   Lifestyle     Physical activity     Days per week: None     Minutes per session: None     Stress: None   Relationships     Social connections     Talks on phone: None     Gets together: None     Attends Amish service: None     Active member of club or organization: None     Attends meetings of clubs or organizations: None     Relationship status: None     Intimate partner violence     Fear of current or ex partner: None     Emotionally abused: None     Physically abused: None     Forced sexual activity: None   Other Topics Concern     None   Social History Narrative     None     Past Surgical History:   Procedure Laterality Date     LASIK Bilateral      WISDOM TOOTH EXTRACTION       No Known Allergies  Active Ambulatory Problems     Diagnosis Date Noted     Female stress incontinence 2018     Resolved Ambulatory Problems     Diagnosis Date Noted     Pregnancy test positive 2018     Pregnant 2019      (normal spontaneous vaginal delivery) 2019     Encounter for IUD insertion 2019     Past Medical History:   Diagnosis Date     Abnormal Pap smear of cervix      HPV (human papilloma virus) infection      Insomnia        VITALS:  Vitals:    20 0801   BP: 110/62   Patient Site: Right Arm   Patient Position: Sitting   Cuff Size: Adult Regular   Pulse: 78   Weight: 146 lb 12.8 oz (66.6 kg)   Height: 5' 2\" (1.575 m)     BP Readings from Last 3 Encounters:   20 110/62   20 118/74   20 116/62     Wt Readings from Last 3 Encounters:   20 146 lb 12.8 oz (66.6 kg)   20 146 lb (66.2 kg)   20 147 lb 1.6 oz (66.7 kg)     Body mass index is 26.85 kg/m .    PHYSICAL EXAM:  General Appearance: Alert, cooperative, no distress, appears stated age  Head: Normocephalic, without obvious abnormality, atraumatic  Eyes: PERRL, conjunctiva/corneas " clear, EOM's intact  Ears: Normal TM's and external ear canals, both ears  Nose: Nares normal, septum midline,mucosa normal, no drainage  Throat: Lips, mucosa, and tongue normal; teeth and gums normal  Neck: Supple, symmetrical, trachea midline, no adenopathy;  thyroid: not enlarged, symmetric, no tenderness/mass/nodules; no carotid bruit or JVD  Back: Symmetric, no curvature, ROM normal, no CVA tenderness  Lungs: Clear to auscultation bilaterally, respirations unlabored  Breasts:  Deferred until Pap  Heart: Regular rate and rhythm, S1 and S2 normal, no murmur, rub, or gallop,    Extremities: Extremities normal, atraumatic, no cyanosis or edema  Skin: Skin color, texture, turgor normal, no rashes or lesions  Lymph nodes: Cervical, supraclavicular, and axillary nodes normal  Neurologic: Normal      MEDICATIONS:  Current Outpatient Medications   Medication Sig Dispense Refill     cyclobenzaprine (FLEXERIL) 10 MG tablet Take 0.5 tablets (5 mg total) by mouth every 8 (eight) hours as needed for muscle spasms. 30 tablet 1     valACYclovir (VALTREX) 1000 MG tablet TAKE TWO TABLETS BY MOUTH TWICE DAILY FOR 2 DAYS 8 tablet 11     zolpidem (AMBIEN) 5 MG tablet 1/2-1 tablet po at HS prn sleep 30 tablet 0     citalopram (CELEXA) 20 MG tablet Take 1 tablet (20 mg total) by mouth daily. 1/2-1 tablet po daily as directed 30 tablet 2     DAYSEE 0.15 mg-30 mcg (84)/10 mcg (7) 3MPk Take 1 tablet by mouth daily. 91 tablet 3     No current facility-administered medications for this visit.

## 2021-06-12 NOTE — PROGRESS NOTES
Chief complaint: Pap smear and breast exam    HPI: The patient had her for complete exam last week but she was on her heavy day of menstruation so we decided to just do the Pap only today.  She and her  are using natural family planning and condoms for birth control as she does not tolerate hormonal birth control well.    Objective:LMP 09/23/2020 (Exact Date)   Pap smear was taken with the cyto-broom.  Cervix looks clear.  Bimanual was unremarkable.    Breast exam showed some fibroglandular changes but nothing concerning    Assessment: Screening for conditions    Plan: She will be notified of the results of her Pap smear and will pursue further work-up as we need to.

## 2021-06-13 NOTE — PROGRESS NOTES
ASSESSMENT:  1. Routine general medical examination at a health care facility  Discussed pap  - Thyroid Stimulating Hormone (TSH); Future  - Vitamin D, Total (25-Hydroxy); Future  - Lipid Cascade; Future  - Glucose; Future         PLAN:  She had an abnormal Pap smear about 5 years ago and had colposcopy although now she thinks that the management was a little bit aggressive.  She has been normal since then.  She is currently having her period so we will defer in a Pap smear now but I think doing another Pap smear before she tries to conceive next summer would be a good idea and she liked that idea as well.    She will have fasting labs completed at her own clinic and will also do a TSH because of the family history of hypothyroidism.    She got her flu shot already this year.  She has found a dentist and had Lasix eye surgery so she does not be an eye doctor at this point.  She is adapting well to the move from [facility] and will start prenatal vitamins and vitamin D before she tries to conceive next summer.    She says that no matter what form of birth control she tries, she has issues of breakthrough bleeding, so she uses 3 month continuous pack which she gives herself a break about every month for a few days has a breakthrough bleed and then goes back on the medication and that seems to work pretty well for her.  When she is not on birth control pills she has a completely normal..      Orders Placed This Encounter   Procedures     Thyroid Stimulating Hormone (TSH)     Standing Status:   Future     Standing Expiration Date:   10/20/2018     Vitamin D, Total (25-Hydroxy)     Standing Status:   Future     Standing Expiration Date:   10/20/2018     Lipid Cascade     Standing Status:   Future     Standing Expiration Date:   10/20/2018     Order Specific Question:   Fasting is required?     Answer:   Yes     Glucose     Standing Status:   Future     Standing Expiration Date:   10/20/2018     Medications  Discontinued During This Encounter   Medication Reason     CAMRESE 0.15 mg-30 mcg (84)/10 mcg (7) 3MPk Reorder         Health Maintenance Due   Topic Date Due     TD 18+ HE  2005     TDAP ADULT ONE TIME DOSE  2005     ADVANCE DIRECTIVES DISCUSSED WITH PATIENT  2005     PAP SMEAR  2017     INFLUENZA VACCINE RULE BASED (1) 2017       CHIEF COMPLAINT:  Chief Complaint   Patient presents with     Annual Exam     not fasting       HISTORY OF PRESENT ILLNESS:  Aspen Penn is a 32 y.o. female presenting to the clinic today for a physical exam.     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  She and her  moved here from [facility].  They are thinking that they will try to conceive by next summer so she is going to continue on her birth control until about May.  We talked about having prenatal vitamins on board and vitamin D adequate doses prior to conception.    She had an abnormal Pap smear many years ago and had colposcopy but likely did not need colposcopy and they have been normal since then.    Healthy Habits:   Patient reports regular exercise, dental and eye exams. Uses healthy diet. Always uses seatbelts. Reports uses medications as directed.  Alcohol: 4-5 per week  Smoking: none  Caffeine use: 2 per day, sometimes soda  Drug use: none  Birth control: OCP    REVIEW OF SYSTEMS:   All other systems are negative.      There is no immunization history on file for this patient.    GYNECOLOGIC HISTORY:  Last menstrual period: 10/17/17  Contraception: oral contraceptives  Last Pap:  Results were: normal  Last mammogram: n/a  Results were: n/a    OB History      Para Term  AB Living    0 0 0 0 0 0    SAB TAB Ectopic Multiple Live Births    0 0 0 0 0          PFSH:     History   Smoking Status     Never Smoker   Smokeless Tobacco     Never Used     Family History   Problem Relation Age of Onset     Hypothyroidism Mother      No Medical  "Problems Father      No Medical Problems Sister      No Medical Problems Brother      Endometrial cancer Maternal Grandmother      Hypothyroidism Maternal Grandmother      Coronary artery disease Maternal Grandmother      Heart disease Maternal Grandfather      Cancer Paternal Grandmother      Social History     Social History     Marital status:      Spouse name: Moy     Number of children: N/A     Years of education: N/A     Occupational History     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 [employer]     Social History Main Topics     Smoking status: Never Smoker     Smokeless tobacco: Never Used     Alcohol use 2.4 - 3.0 oz/week     4 - 5 Cans of beer per week     Drug use: No     Sexual activity: Yes     Partners: Male     Birth control/ protection: OCP     Other Topics Concern     None     Social History Narrative     None     Past Surgical History:   Procedure Laterality Date     LASIK Bilateral      WISDOM TOOTH EXTRACTION       No Known Allergies  Active Ambulatory Problems     Diagnosis Date Noted     No Active Ambulatory Problems     Resolved Ambulatory Problems     Diagnosis Date Noted     No Resolved Ambulatory Problems     Past Medical History:   Diagnosis Date     Insomnia        VITALS:  Vitals:    10/20/17 1308   BP: 108/74   Patient Site: Right Arm   Patient Position: Sitting   Cuff Size: Adult Regular   Pulse: 64   Weight: 146 lb (66.2 kg)   Height: 5' 2\" (1.575 m)     BP Readings from Last 3 Encounters:   10/20/17 108/74     Wt Readings from Last 3 Encounters:   10/20/17 146 lb (66.2 kg)     Body mass index is 26.7 kg/(m^2).    PHYSICAL EXAM:  General Appearance: Alert, cooperative, no distress, appears stated age  Head: Normocephalic, without obvious abnormality, atraumatic  Eyes: PERRL, conjunctiva/corneas clear, EOM's intact  Ears: Normal TM's and external ear canals, both ears  Nose: Nares normal, septum midline,mucosa normal, no drainage  Throat: Lips, mucosa, and tongue normal; " teeth and gums normal  Neck: Supple, symmetrical, trachea midline, no adenopathy;  thyroid: not enlarged, symmetric, no tenderness/mass/nodules; no carotid bruit or JVD  Back: Symmetric, no curvature, ROM normal, no CVA tenderness  Lungs: Clear to auscultation bilaterally, respirations unlabored  Breasts: No breast masses, tenderness, asymmetry, or nipple discharge.  Heart: Regular rate and rhythm, S1 and S2 normal, no murmur, rub, or gallop, Abdomen: Soft, non-tender, bowel sounds active all four quadrants,  no masses, no organomegaly  Pelvic: Not Examined, has menses  Extremities: Extremities normal, atraumatic, no cyanosis or edema  Skin: Skin color, texture, turgor normal, no rashes or lesions  Lymph nodes: Cervical, supraclavicular, and axillary nodes normal  Neurologic: Normal         MEDICATIONS:  Current Outpatient Prescriptions   Medication Sig Dispense Refill     CAMRESE 0.15 mg-30 mcg (84)/10 mcg (7) 3MPk Take 1 tablet by mouth daily. 1 Package 4     melatonin 3 mg Tab tablet Take 3-15 mg by mouth at bedtime as needed.       zolpidem (AMBIEN) 5 MG tablet Take 2.5 mg by mouth daily.       No current facility-administered medications for this visit.

## 2021-06-13 NOTE — TELEPHONE ENCOUNTER
Controlled Substance Refill Request  Medication Name:   Requested Prescriptions     Pending Prescriptions Disp Refills     zolpidem (AMBIEN) 5 MG tablet 30 tablet 0     Si/2-1 tablet po at HS prn sleep     Date Last Fill: 3/23/2020  Is patient out of medication?: N/A - electronic request  Patient notified refills processed within 3 business days: N/A - electronic request  [facility]ed [facility]   Submit electronically to pharmacy  Controlled Substance Agreement on file:   Encounter-Level CSA Scan Date:    There are no encounter-level csa scan date.        Last office visit: 10/2/2020 with PCP Dr NOEMÍ Rodriguez

## 2021-06-13 NOTE — PATIENT INSTRUCTIONS - HE
Dear Aspen Penn,    Based on your exposure to COVID-19 (coronavirus), we would like to test you for this virus. I have placed an order for this test and please call [phone] to schedule testing. [employer] employees please call [phone].  [employer] employees call [phone]. The optimal time to test after exposure is 5-7 days from the exposure.    If you know you have had close contact with someone who tested positive, you should be quarantined for 14 days after this exposure. You should stay in quarantine for the14 days even if the covid test is negative.     Quarantine means:  Stay home and away from others. Don't go to school or anywhere else. Generally quarantine means staying home from work but there are some exceptions to this. Please contact your workplace.  No hugging, kissing or shaking hands.  Don't let anyone visit.  Cover your mouth and nose with a mask, tissue or washcloth to avoid spreading germs.  Wash your hands and face often. Use soap and water.    What are the symptoms of COVID-19?  The most common symptoms are cough, fever and trouble breathing. Less common symptoms include headache, body aches, fatigue (feeling very tired), chills, sore throat, stuffy or runny nose, diarrhea (loose poop), loss of taste or smell, belly pain, and nausea or vomiting (feeling sick to your stomach or throwing up).  After 14 days, if you have still don't have symptoms, you likely don't have this virus.  If you develop symptoms, follow these guidelines.  If you're normally healthy: Please start another eVisit.  If you have a serious health problem (like cancer, heart failure, an organ transplant or kidney disease): Call your specialty clinic. Let them know that you might have COVID-19.    When it's time for your COVID test:  Stay at least 6 feet away from others. (If someone will drive you to your test, stay in the backseat, as far away from the  as you can.)  Cover your mouth and  nose with a mask, tissue or washcloth.  Go straight to the testing site. Don't make any stops on the way there or back.    Please note  Patients in these groups are at risk for severe illness due to COVID-19:    People 65 years and older    People who live in a nursing home or long-term care facility    People with chronic disease (lung, heart, cancer, diabetes, kidney, liver, immunologic)    People who have a weakened immune system, including those who:  o Are in cancer treatment  o Take medicine that weakens the immune system, such as corticosteroids  o Had a bone marrow or organ transplant  o Have an immune deficiency  o Have poorly controlled HIV or AIDS  o Are obese (body mass index of 40 or higher)  o Smoke regularly    Where can I get more information?  [facility] - About COVID-19: [email or web address]  CDC - What to Do If You're Sick: www.cdc.gov/coronavirus/2019-ncov/about/steps-when-sick.html  CDC - Ending Home Isolation: www.cdc.gov/coronavirus/2019-ncov/hcp/disposition-in-home-patients.html  [facility] - Caring for Someone: www.cdc.gov/coronavirus/2019-ncov/if-you-are-sick/care-for-someone.html  [facility] - Interim Guidance for Hospital Discharge to Home: www.health.[facility].[place]/diseases/coronavirus/hcp/hospdischarge.pdf  [facility] clinical trials (COVID-19 research studies): clinicalaffairs.[facility]-clinical-trials  Below are the COVID-19 hotlines at the [facility] ([facility]). Interpreters are available.  For health questions: Call 732-665-6416 or 1-769.135.6032 (7 a.m. to 7 p.m.)  For questions about schools and childcare: Call 384-263-1265 or 1-561.556.2925 (7 a.m. to 7 p.m.)

## 2021-06-13 NOTE — PROGRESS NOTES
Provider E-Visit time total (minutes): 5      Assessment:   The encounter diagnosis was Close exposure to 2019 novel coronavirus.     Plan:   No medications were ordered this encounter    Patient Instructions     Dear Aspen Penn,    Based on your exposure to COVID-19 (coronavirus), we would like to test you for this virus. I have placed an order for this test and please call [phone] to schedule testing. [employer] employees please call [phone].  [employer] employees call [phone]. The optimal time to test after exposure is 5-7 days from the exposure.    If you know you have had close contact with someone who tested positive, you should be quarantined for 14 days after this exposure. You should stay in quarantine for the14 days even if the covid test is negative.     Quarantine means:  Stay home and away from others. Don't go to school or anywhere else. Generally quarantine means staying home from work but there are some exceptions to this. Please contact your workplace.  No hugging, kissing or shaking hands.  Don't let anyone visit.  Cover your mouth and nose with a mask, tissue or washcloth to avoid spreading germs.  Wash your hands and face often. Use soap and water.    What are the symptoms of COVID-19?  The most common symptoms are cough, fever and trouble breathing. Less common symptoms include headache, body aches, fatigue (feeling very tired), chills, sore throat, stuffy or runny nose, diarrhea (loose poop), loss of taste or smell, belly pain, and nausea or vomiting (feeling sick to your stomach or throwing up).  After 14 days, if you have still don't have symptoms, you likely don't have this virus.  If you develop symptoms, follow these guidelines.  If you're normally healthy: Please start another eVisit.  If you have a serious health problem (like cancer, heart failure, an organ transplant or kidney disease): Call your specialty clinic. Let them know that you might have  COVID-19.    When it's time for your COVID test:  Stay at least 6 feet away from others. (If someone will drive you to your test, stay in the backseat, as far away from the  as you can.)  Cover your mouth and nose with a mask, tissue or washcloth.  Go straight to the testing site. Don't make any stops on the way there or back.    Please note  Patients in these groups are at risk for severe illness due to COVID-19:    People 65 years and older    People who live in a nursing home or long-term care facility    People with chronic disease (lung, heart, cancer, diabetes, kidney, liver, immunologic)    People who have a weakened immune system, including those who:  o Are in cancer treatment  o Take medicine that weakens the immune system, such as corticosteroids  o Had a bone marrow or organ transplant  o Have an immune deficiency  o Have poorly controlled HIV or AIDS  o Are obese (body mass index of 40 or higher)  o Smoke regularly    Where can I get more information?  [facility] - About COVID-19: [email or web address]  CDC - What to Do If You're Sick: www.cdc.gov/coronavirus/2019-ncov/about/steps-when-sick.html  CDC - Ending Home Isolation: www.cdc.gov/coronavirus/2019-ncov/hcp/disposition-in-home-patients.html  CDC - Caring for Someone: www.cdc.gov/coronavirus/2019-ncov/if-you-are-sick/care-for-someone.html  [facility] - Interim Guidance for Hospital Discharge to Home: www.health.[facility].mn.us/diseases/coronavirus/hcp/hospdischarge.pdf  [facility] clinical trials (COVID-19 research studies): clinicalaffairs.[facility]-clinical-trials  Below are the COVID-19 hotlines at the [place] [facility] ([facility]). Interpreters are available.  For health questions: Call 227-615-5594 or 1-803.263.4325 (7 a.m. to 7 p.m.)  For questions about schools and childcare: Call 674-930-9705 or 1-992.704.4562 (7 a.m. to 7 p.m.)        No follow-ups on file.    Subjective:   Aspen Pnen is a 35 y.o.  female who submitted an eVisit request for evaluation of her Covid Concern.  See the questionnaire and message section of encounter report for information related to history of present illness and review of systems.    The following portions of the patient's history were reviewed and updated as appropriate:  She does not have any pertinent problems on file.  She has No Known Allergies..     Objective:   No exam performed today, patient submitted as eVisit.

## 2021-06-14 NOTE — PROGRESS NOTES
[facility] Rehabilitation Daily Progress     Patient Name: Aspen Penn  Date: 1/15/2021  Visit #: 2  Start of Care: 21  Referral Diagnosis: B elbow pain  Referring provider: Nanda Rodriguez MD  Visit Diagnosis:     ICD-10-CM    1. Pain of both elbows  M25.521     M25.522          Assessment:   Patient is presenting with improvement in pain level and improvement in sleeping position. PT reviewed the home exercises and progressed home program.  Patient is progressing as planned.    HEP/POC compliance is  good .  Patient is appropriate to continue with skilled physical therapy intervention, as indicated by initial plan of care.    Goal Status:  Pt. will be independent with home exercise program in : 4 weeks    Pt will: will improve function of B hands in the morning 3 days a week in 8 weeks for improved functionality  Pt will: improve sleeping position, avoiding stomach 50% of the time for improved sleep habits in 8 weeks      Plan / Patient Education:     Continue with initial plan of care.  Progress with home program as tolerated.    PLAN for next visit  Subjective:     Pain Ratin  The biggest change is adjusting her sleeping position. She is doing very well, less pain  Exercises are going well      Objective:       Treatment Today       Exercises:   Exercise #6: B median nerve glide x 10 B  Comment #6: unilateral pectoral stretch x 30 seconds  Exercise #7: Greenband tricep pull down x 10      TREATMENT MINUTES COMMENTS   Evaluation     Self-care/ Home management     Manual therapy 20 Patient positioned in supine- MFR to B elbow cubital fossa, bicep. Median nerve release. MFR to Pec minor   Neuromuscular Re-education     Therapeutic Activity     Therapeutic Exercises 10 See flow sheet for date performed   Gait training     Modality__________________                Total 30    Blank areas are intentional and mean the treatment did not include these items.       Marilee Carrington  1/15/2021

## 2021-06-14 NOTE — PROGRESS NOTES
[facility]   Elbow / Wrist Initial Evaluation    Patient Name: Aspen Penn  Date of evaluation: 1/6/2021  Referral Diagnosis: Dysesthesia affecting both sides of body  Referring provider: Nanda Rodriguez MD  Visit Diagnosis:     ICD-10-CM    1. Pain of both elbows  M25.521     M25.522        Assessment:    Patient is noting B elbow pain with R>L and hand numbness since 11/2020. She is noting no new trauma or injury and her symptoms are most likely related to her sleeping position. PT exam reveals normal strength, Myofascial restriction around elbow and bicep and positive neural tension. PT initiated manual therapy and home exercises and will follow with the patient once every two weeks. PT established goals in agreement with the patient.      Pt. is appropriate for skilled PT intervention as outlined in the Plan of Care (POC).  Pt. is a good candidate for skilled PT services to improve pain levels and function.    Goals:  Pt. will be independent with home exercise program in : 4 weeks    Pt will: will improve function of B hands in the morning 3 days a week in 8 weeks for improved functionality  Pt will: improve sleeping position, avoiding stomach 50% of the time for improved sleep habits in 8 weeks      Patient's expectations/goals are realistic.    Barriers to Learning or Achieving Goals:  No Barriers.       Plan / Patient Instructions:        Plan of Care:   Communication with: Referral Source  Patient Related Instruction: Nature of Condition;Treatment plan and rationale;Posture  Times per Week: 1  Number of Weeks: 10  Number of Visits: 10  Discharge Planning: to independent home program and self-care  Therapeutic Exercise: Strengthening;Stretching;ROM  Neuromuscular Reeducation: posture  Manual Therapy: soft tissue mobilization;myofascial release;joint mobilization  Modalities: TENS       Plan for next visit: follow-up on MT, straight arm planks, tricep strnegthening     Subjective:          History of Present Illness:    Aspen is a 35 y.o. female who presents to therapy today with complaints of  B elbow, R>L. Possibly median versus ulnar. She notes that her hands are completely numb when she wakes up in the morning, making it hard to use her phone. Occasionally with the elbow motion     Sleeping position- on stomach and on the L side with L arm extended and R arm tucked underneath. We discussed changing the position to avoid neck motion.    Goal- decrease the AM tingling    Pain Ratin  Pain rating at best: 0    Pain description: pain and tingling    Functional limitations are described as occurring with:   Sleeping and AM     Patient reports benefit from:  rest         Objective:      Note: Items left blank indicates the item was not performed or not indicated at the time of the evaluation.          Elbow / Wrist Examination  1. Pain of both elbows       Involved side: Bilateral  Posture Observation:      Shoulder/Thoracic complex: Moderate bilateral scapular protraction     Cervical Clearing: negative, ROM WFL's and negative spurling    Shoulder Clearing: full motion      Elbow / Wrist ROM  No los of motion all WFL's  Date:      Elbow and wrist  ROM ( )   AROM in degrees AROM in degrees AROM in degrees    Right Left Right Left Right Left   Elbow Flexion (150 )         Elbow Extension (0 )         Forearm Supination (0-90 )         Forearm Pronation (0-90 )         Wrist Flexion (0-80 )         Wrist Extension (0-70 )         Wrist Radial Deviation (0-20 )         Wrist Ulnar Deviation (0-30 )          PROM in degrees PROM in degrees PROM in degrees    Right Left Right Left Right Left   Elbow Flexion (150 )         Elbow Extension (0 )         Forearm Supination (0-90 )         Forearm Pronation (0-90 )         Wrist Flexion (0-80 )         Wrist Extension (0-70 )         Wrist Radial Deviation (0-20 )         Wrist Ulnar Deviation (0-30 )           Elbow / Wrist Strength:   All   Date:       Elbow/Wrist Strength (/5)  Manual Muscle Test (MMT) MMT MMT MMT    Right Left Right Left Right Left   Elbow Flexion          Elbow Extension          Forearm Supination         Forearm Pronation         Wrist Flexion         Wrist Extension         Wrist Radial Deviation         Wrist Ulnar Deviation           Elbow / Wrist Special Tests:    Elbow Right (+/-) Left (+/-) Wrist Right (+/-) Left (+/-)   Elbow extension   Axial load of thumb     Valgus stress   Scaphoid shift test     Varus stress   Finkelstein s test     Moving valgus stress   Carpal compression     Tinel s test -  Tinel s test     Ulnar nerve compression   Phalen s test +    Lateralepicondylalgia  cluster.  Pain with:   Clinical prediction rule for CTS:       Palpation of Lat. Epi.     Age >45       Resisted wrist extension - -   Shaking hands relieves symptoms       Resisted middle finger extension     Wrist Ratio Index >0.67     Other:Median + +   Reduced Sensory Field First Digit     Other: Ulnar - - TFCC Lift Test     Other Hoffmans- - - TFCC Load Test       Flexibility: normal    Palpation:  B bicep and median nerve restriction, myofascial restriction in elbow       Strength Tests:   Not needed  Date:      /Pinch Strength  in lbs. Right Left Right Left Right Left   2nd position  strength          Three point pinch          Lateral key pinch         Five position curve                 1         2         3         4         5             Treatment Today       Exercises:  Exercise #6: B median nerve glide x 10 B  Comment #6: unilateral pectoral stretch x 30 seconds      TREATMENT MINUTES COMMENTS   Evaluation 20    Self-care/ Home management     Manual therapy 20 Patient positioned in supine- MFR to B elbow cubital fossa, bicep. Median nerve release   Neuromuscular Re-education     Therapeutic Activity     Therapeutic Exercises 10 See flow sheet   Gait training     Modality__________________                Total 50    Blank areas are  intentional and mean the treatment did not include these items.     PT Evaluation Code: (Please list factors)  Patient History/Comorbidities:   Patient Active Problem List   Diagnosis     Female stress incontinence     ASCUS with positive high risk HPV cervical       Examination: elbow  Clinical Presentation: stable  Clinical Decision Making: low    Patient History/  Comorbidities Examination  (body structures and functions, activity limitations, and/or participation restrictions) Clinical Presentation Clinical Decision Making (Complexity)   No documented Comorbidities or personal factors 1-2 Elements Stable and/or uncomplicated Low   1-2 documented comorbidities or personal factor 3 Elements Evolving clinical presentation with changing characteristics Moderate   3-4 documented comorbidities or personal factors 4 or more Unstable and unpredictable High                Marilee Carrington  1/6/2021  10:54 AM

## 2021-06-14 NOTE — PROGRESS NOTES
[facility] Rehabilitation Daily Progress     Patient Name: Aspen Penn  Date: 2021  Visit #: 3  Start of Care: 21  Referral Diagnosis: B elbow pain  Referring provider: Nanda Rodriguez MD  Visit Diagnosis:     ICD-10-CM    1. Pain of both elbows  M25.521     M25.522          Assessment:   Patient is presenting with improvement in pain level and improvement in sleeping position. PT reviewed the home exercises and progressed home program.  PT advanced the patients' home exercises with the additions of tricep push-ups.    HEP/POC compliance is  good .  Patient is appropriate to continue with skilled physical therapy intervention, as indicated by initial plan of care.    Goal Status:  Pt. will be independent with home exercise program in : 4 weeks    Pt will: will improve function of B hands in the morning 3 days a week in 8 weeks for improved functionality  Pt will: improve sleeping position, avoiding stomach 50% of the time for improved sleep habits in 8 weeks      Plan / Patient Education:     Continue with initial plan of care.  Progress with home program as tolerated.    PLAN for next visit: consider independent trial  Subjective:     Pain Ratin     A little worse with sleeping due to twin bed, less tingling an numbness overall  Compliant with home exercises      Objective:       Treatment Today       Exercises:   Exercise #6: B median nerve glide x 10 B  Comment #6: unilateral pectoral stretch x 30 seconds  Exercise #7: Greenband tricep pull down x 10      TREATMENT MINUTES COMMENTS   Evaluation     Self-care/ Home management     Manual therapy 25 Patient positioned in supine- MFR to B elbow cubital fossa, bicep. Median nerve release. B UE release mobilization   Neuromuscular Re-education     Therapeutic Activity     Therapeutic Exercises 5 See flow sheet for date performed   Gait training     Modality__________________                Total 30    Blank areas are intentional and mean the  treatment did not include these items.       Marilee Carrington  1/22/2021

## 2021-06-15 NOTE — PROGRESS NOTES
[facility] Rehabilitation Discharge Summary  Patient Name: Aspen Penn  Date: 2021  Referral Diagnosis: [unfilled]  Referring provider: Nanda Rodriguez MD  Visit Diagnosis:   1. Pain of both elbows         Goals:  No data recorded  No data recorded    Patient was seen for 4 visits from 21 to 21 with 0 missed appointments.  The patient met goals and has demonstrated understanding of and independence in the home program for self-care, and progression to next steps.  She will initiate contact if questions or concerns arise.    Therapy will be discontinued at this time.  The patient will need a new referral to resume.    Thank you for your referral.  Marilee Carrington  2021  12:46 PM  [facility] Daily Progress     Patient Name: Aspen Penn  Date: 2021  Visit #: 4  Start of Care: 21  Referral Diagnosis: B elbow pain  Referring provider: Nanda Rodriguez MD  Visit Diagnosis:     ICD-10-CM    1. Pain of both elbows  M25.521     M25.522          Assessment:   Patient is presenting with improvement in pain level and improvement in sleeping position. She has not experienced any symptoms since the last session. Median nerve mobility is now also WFL's. Patient to begin independent home program and self-care.     HEP/POC compliance is  good .  Patient is appropriate to continue with skilled physical therapy intervention, as indicated by initial plan of care.    Goal Status:  Pt. will be independent with home exercise program in : 4 weeks    Pt will: will improve function of B hands in the morning 3 days a week in 8 weeks for improved functionality  Pt will: improve sleeping position, avoiding stomach 50% of the time for improved sleep habits in 8 weeks      Plan / Patient Education:     Patient to start independent home trial. PT to hold chart   Subjective:     Pain Ratin   She is feeling great with her arms and has not had any symptoms since the last  session.   She would like help with hip hinge education      Objective:     Median nerve B negative    Treatment Today       Exercises:   Exercise #6: B median nerve glide x 10 B  Comment #6: unilateral pectoral stretch x 30 seconds  Exercise #7: Greenband tricep pull down x 10      TREATMENT MINUTES COMMENTS   Evaluation     Self-care/ Home management     Manual therapy 20 Patient positioned in supine- MFR to B elbow cubital fossa, bicep. Median nerve release. B UE release mobilization   Neuromuscular Re-education     Therapeutic Activity     Therapeutic Exercises 8 Hip hinge demonstrated by therapist and patient. PT demonstrated neutral back alignment with gentle draw of palm down leg   Gait training     Modality__________________                Total 28    Blank areas are intentional and mean the treatment did not include these items.       Marilee Carrington  2/5/2021

## 2021-06-15 NOTE — PROGRESS NOTES
ASSESSMENT/PLAN:  1. Acute bronchitis, unspecified organism  35-year-old female with 3-week history of unresolving cough consistent with bronchitis.  We will do a 5-day course of a azithromycin and she is given a prescription for Cheratussin to be used as needed.  She will follow-up if symptoms are worsening or unresolving.  She has a prescription for Tessalon Perles which she can use if more nonsedating cough suppressant as needed.  - azithromycin (ZITHROMAX Z-VAISHNAVI) 250 MG tablet; Take 2 tablets (500 mg) on  Day 1,  followed by 1 tablet (250 mg) once daily on Days 2 through 5.  Dispense: 6 tablet; Refill: 0  - codeine-guaiFENesin (GUAIFENESIN AC)  mg/5 mL liquid; Take 5 mL by mouth 2 (two) times a day as needed for cough.  Dispense: 240 mL; Refill: 0    2. Herpes simplex virus infection  - valACYclovir (VALTREX) 1000 MG tablet; TAKE TWO TABLETS BY MOUTH TWICE DAILY FOR 2 DAYS  Dispense: 8 tablet; Refill: 11      Dragon dictation was used for this note.  Speech recognition errors are a possibility.    No follow-ups on file.  There are no Patient Instructions on file for this visit.    No orders of the defined types were placed in this encounter.    Medications Discontinued During This Encounter   Medication Reason     valACYclovir (VALTREX) 1000 MG tablet Reorder         CHIEF COMPLAINT;  No chief complaint on file.      HISTORY OF PRESENT ILLNESS:  Aspen is a 35 y.o. female presenting to the clinic today for 3-week history of cough.  She has already tested negative for Covid.  The cough is persistent and is particularly bad at night.  She says that it is productive.  She is not feeling any wheezing.  No underlying lung disease.  No one else around her is sick.  No exposures that she is aware of.  She started using Tessalon Perles a couple days ago which are somewhat helpful but the symptoms persist.  Other at home remedies include Flonase, Afrin nasal spray, NyQuil, and guaifenesin.  No fevers or chills.   No ear pain or throat pain.  Small amount of nasal congestion and discharge.    Remainder of 12-point ROS is negative.    TOBACCO USE:  Social History     Tobacco Use   Smoking Status Never Smoker   Smokeless Tobacco Never Used       VITALS:  Vitals:    02/23/21 0910   BP: 110/78   Patient Site: Left Arm   Patient Position: Sitting   Cuff Size: Adult Regular   Pulse: 75   SpO2: 98%   Weight: 141 lb 4.8 oz (64.1 kg)     Wt Readings from Last 3 Encounters:   02/23/21 141 lb 4.8 oz (64.1 kg)   09/23/20 146 lb 12.8 oz (66.6 kg)   05/08/20 146 lb (66.2 kg)     Body mass index is 25.84 kg/m .    PHYSICAL EXAM:  GENERAL APPEARANCE: Alert, cooperative, no distress, appears stated age      RECENT RESULTS  No results found for this or any previous visit (from the past 48 hour(s)).    MEDICATIONS:  Current Outpatient Medications   Medication Sig Dispense Refill     benzonatate (TESSALON) 200 MG capsule Take 1 capsule (200 mg total) by mouth 3 (three) times a day as needed for cough. 30 capsule 0     cyclobenzaprine (FLEXERIL) 10 MG tablet Take 0.5 tablets (5 mg total) by mouth every 8 (eight) hours as needed for muscle spasms. 30 tablet 1     zolpidem (AMBIEN) 5 MG tablet 1/2-1 tablet po at HS prn sleep 30 tablet 0     azithromycin (ZITHROMAX Z-VAISHNAVI) 250 MG tablet Take 2 tablets (500 mg) on  Day 1,  followed by 1 tablet (250 mg) once daily on Days 2 through 5. 6 tablet 0     codeine-guaiFENesin (GUAIFENESIN AC)  mg/5 mL liquid Take 5 mL by mouth 2 (two) times a day as needed for cough. 240 mL 0     valACYclovir (VALTREX) 1000 MG tablet TAKE TWO TABLETS BY MOUTH TWICE DAILY FOR 2 DAYS 8 tablet 11     No current facility-administered medications for this visit.

## 2021-06-16 PROBLEM — F43.23 ADJUSTMENT DISORDER WITH MIXED ANXIETY AND DEPRESSED MOOD: Status: ACTIVE | Noted: 2021-04-26

## 2021-06-16 PROBLEM — N39.3 FEMALE STRESS INCONTINENCE: Status: ACTIVE | Noted: 2018-09-17

## 2021-06-16 NOTE — PROGRESS NOTES
"Aspen Penn is a 36 y.o. female who is being evaluated via a billable telephone visit.      What phone number would you like to be contacted at? [phone]  How would you like to obtain your AVS? AVS Preference: Sudhakar.    Assessment & Plan     Adjustment disorder with mixed anxiety and depressed mood  Diagnosed and medication started by a psychiatric nurse practitioner and she is really enjoying this and will likely increase to 50 mg after a couple of more weeks.  - desvenlafaxine succinate (PRISTIQ) 25 mg Tb24; Take 25 mg by mouth daily.    Primary insomnia  While she was starting this medication, she had lots of insomnia as well as just managing the psychosocial stressors before she started this medication.  - zolpidem (AMBIEN) 5 MG tablet; 1/2-1 tablet po at HS prn sleep  We will do a controlled substance contract when she returns in September to do her health maintenance review.           BMI:   Estimated body mass index is 25.84 kg/m  as calculated from the following:    Height as of 9/23/20: 5' 2\" (1.575 m).    Weight as of 2/23/21: 141 lb 4.8 oz (64.1 kg).          Nanda Rodriguez MD  [facility]   Aspen Penn is 36 y.o. and presents today for the following health issues   HPI   She been having lots of trouble with some psychosocial stressors and some insomnia.  She was going to see a therapist and ultimately the therapist encouraged her to seek care of a psychiatric nurse practitioner.  The patient was started on Pristiq 25 mg and will likely go up to 50 mg in a couple of weeks.  She is absolutely thrilled with the results.  Prior to starting the medication and while she was going through some of the therapy, she had a lot of insomnia and so she went through her Ambien more rapidly than we had discussed.  Because I know the reason for this, I will give her another refill and when she comes in in September for her health maintenance review and " Pap smear, we will sign a controlled substance agreement.     Review of Systems         Objective       Vitals:  No vitals were obtained today due to virtual visit.    Physical Exam            Phone call duration: 20 minutes    Nanda Rodriguez MD

## 2021-06-17 NOTE — PATIENT INSTRUCTIONS - HE
Patient Instructions by Nanda Rodriguez MD at 2019  3:10 PM     Author: Nanda Rodirguez MD Service: -- Author Type: Physician    Filed: 2019  3:30 PM Encounter Date: 2019 Status: Signed    : Nanda Rodriguez MD (Physician)         Patient Education     After a Vaginal Birth    After having a baby, your body may be very tired. It can take time to recover from a vaginal delivery. You may stay in the hospital or birth center from 1 to 4 days. In some cases, you may be able to go home the same day.  Right after the delivery  Your temperature and blood pressure will be taken until they are stable. A nurse or other healthcare provider will observe you as you rest. You may have afterbirth pains. These are cramps caused by the uterus shrinking. Sanitary pads are used to soak up the discharge of the uterine lining. To make sure that you arent bleeding too much, the pad will be checked. And the firmness of your uterus will be checked. To do this, a nurse will gently push down on your stomach. If you had anesthesia, youll be watched closely until you can feel and move your toes. If you have perineal pain (pain between the vagina and anus), an ice pack can help.  Boelus care  While still in the hospital or birth center, youll learn how to hold and feed your baby. You will also be given instructions on how to care for your baby. This includes bathing and feeding.   Preparing to go home  You may be anxious to go home as soon as possible. Before you and your baby go home, a healthcare provider will check to be sure you are healthy enough to take care of your baby and yourself. Youre ready to go home when:    You can walk to the bathroom and use the bathroom without help.    You can eat solid food and swallow pills (if needed).    You have no sign of infection or other health problems, including fever.     You have adequate pain control.    Your bleeding isn't excessive.    You are able to care for your   and are emotionally stable.  Before leaving the hospital or birth center, youll be given written instructions for home self-care after vaginal delivery. Be sure to follow these instructions carefully. If you have questions or concerns, talk about them now.  If you have stitches  You may have received stitches in the skin near your vagina. The stitches might have closed an episiotomy (an incision that enlarges the opening of the vagina). Or you may have needed stitches to repair torn skin. Either way, your stitches should dissolve within weeks. Until then, you can help reduce discomfort, aid healing, and reduce your risk of infection by keeping the stitches clean. These tips can help:    Gently wipe from front to back after you urinate or have a bowel movement.    After wiping, spray warm water on the area. Or you can have a sitz bath. This means sitting in a tub with a few inches of water in it. Then pat the area dry or use a hairdryer on a cool setting.    Do not use soap or any solution except water on the area.    You can take a shower unless told not to.    Change sanitary pads at least every 2 to 4 hours.    Place cold or heat packs on the area as directed by your healthcare providers or nurses. Keep a thin towel between the pack and your skin.    Sit on firm seats so the stitches pull less.   follow-up  Schedule a  follow-up exam with your healthcare provider for about 6 weeks after delivery. During this exam, your uterus and vaginal area will be checked. Contact your healthcare provider if you think you or your baby are having any problems.  When to call your healthcare provider  Call your healthcare provider right away if you have:    A fever of 100.4 F (38.0 C) or higher    Bleeding that needs a new sanitary pad after an hour, or large blood clots    Pain in your vagina that gets worse and isn't relieved with medicine    Swelling, discharge, or increased pain from vaginal tear or  episiotomy    Burning, pain, red streaks, or lumpy areas in your breasts that may be accompanied by flu-like symptoms    Cracks, blisters, or blood on your nipples    Burning or pain when you urinate    Nausea or vomiting    Dizziness or fainting    Feelings of extreme sadness or anxiety, or a feeling that you dont want to be with your baby    Belly pain that isnt relieved with medicine    Vaginal discharge that has a bad odor    No bowel movement for 5 days    Painful urination, or inability to control urination    Redness, warmth, or pain in the lower leg    Chest pain   Date Last Reviewed: 12/1/2017 2000-2017 [employer]. [address] [place] [address]. All rights reserved. This information is not intended as a substitute for professional medical care. Always follow your healthcare professional's instructions.

## 2021-06-18 NOTE — PROGRESS NOTES
Chief complaint: Pap smear  Preconception counseling    HPI: When the patient was in for her health maintenance review last fall, we were not able to do the Pap smear because she had her menstrual period.  She just has gone off birth control pills that she and her  would like to try to conceive next month.    We talked at length about some pre-conception counseling and testing that we could do now.  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  She is on adequate folate supplementation, does not smoke or drink alcohol and limits her caffeine intake.    Objective:/64 (Patient Site: Right Arm, Patient Position: Sitting, Cuff Size: Adult Regular)  Wt 149 lb 4.8 oz (67.7 kg)  LMP 05/16/2018  BMI 27.31 kg/m2  Pap smear was taken with a slight improvement in her bimanual is unremarkable.    Assessment: Screening Pap  Preconception counseling    Plan: She will be notified of the results of her Pap smear and will hope to see her soon for her first OB visit.  After she has a confirmation of the pregnancy, will order an early ultrasound so we know are dating specifically.  And will consider doing toxoplasmosis as well as hepatitis C and Parvovirus B19 with her prenatal labs.

## 2021-06-18 NOTE — LETTER
Letter by Page Whitehead RN at      Author: Page Whitehead RN Service: -- Author Type: --    Filed:  Encounter Date: 3/4/2019 Status: (Other)       Honoring Choices Advance Care Planning  [facility]  [address] [place] [address] [address] [place] [address]        Aspen Penn  [address] [place] [id number]    Dear MsMi Penn,     We have reviewed the Health Care Directive dated 2/20/2019 which you presented for addition to your medical record. Unfortunately, our review indicates the document is not legally valid for   the following reason: Your signature was not witnessed or notarized.     In order to create a legal document you will need to have your signature witnessed by 2 people or notarized. Notaries and witnesses cannot be named as your health care agents per state law. In addition, only one of your witnesses can be employed by our health care system.    We greatly value the opportunity to assist you in documenting your choices and to honor your   wishes. We apologize for any inconvenience. We have several options to assist you in updating   your document so it meets legal requirements.       Health Care Directives and Advance Care Planning resources can be viewed and printed  for free at our web site:[email or web address].     Free group classes on Advance Care Planning and completing a Health Care Directive are  available at multiple locations and times. These classes are led by trained staff who will   provide information and guide you through a Health Care Directive. They can also review, notarize and add your Health Care Directive to your medical record. [place] for a class at [email or web address] or by calling [employer] at [phone] or toll free [phone].    COPIES of completed Health Care Directives can be brought or mailed to any of our   locations, including the address listed below. You can also email a copy to  [email or web address] .     For additional assistance or questions you can email us at [email or web address] or call [phone]    Sincerely,     Zac Andrade Advance Care Planning  [facility], [facility] and [address] [address]   Email us: [email or web address] Call us: [phone]  Visit at: [email or web address]

## 2021-06-18 NOTE — PATIENT INSTRUCTIONS - HE
Patient Instructions by Amber Parra MD at 5/8/2020  9:30 AM     Author: Amber Parra MD Service: -- Author Type: Physician    Filed: 5/8/2020  9:58 AM Encounter Date: 5/8/2020 Status: Signed    : Amber Parra MD (Physician)         Patient Education     Corneal Abrasion    You have received a scratch or scrape (abrasion) to your cornea. The cornea is the clear part in the front of the eye. This sensitive area is very painful when injured. You may make tears frequently, and your vision may be blurry until the injury heals. You may be sensitive to light.  This part of the body heals quickly. You can expect the pain to go away within 24 to 48 hours. If the abrasion is large or deep, your doctor may apply an eye patch, although this is not always done. An antibiotic ointment or eye drops may also be used to prevent infection.  Numbing drops may be used to relieve the pain temporarily so that your eyes can be examined. But these drops cant be prescribed for home use because that would prevent healing and lead to more serious problems. Also, if you cant feel your eye, there is a chance of accidentally injuring it further without knowing it.  Home care    A cold pack may be applied over the eye (or eye patch) for 20 minutes at a time, to reduce pain. To make a cold pack, put ice cubes in a plastic bag that seals at the top. Wrap the bag in a clean, thin towel or cloth.    You may use acetaminophen or ibuprofen to control pain, unless another pain medicine was prescribed. Note: If you have chronic liver or kidney disease or have ever had a stomach ulcer or GI bleeding, talk with your doctor before using these medicines.    Rest your eyes and dont read until symptoms are gone.    If you use contact lenses, dont wear them until all symptoms are gone.    If your vision is affected by the corneal abrasion or if an eye patch was applied, dont drive a motor vehicle or operate machinery until all  symptoms are gone. You may have trouble judging distances using only one eye.    If your eyes are sensitive to light, try wearing sunglasses, or stay indoors until symptoms go away.  Follow-up care  Follow up with your healthcare provider, or as advised.    If no patch was put on your eye and the pain continues for more than 48 hours, you should have another exam. Contact your healthcare provider to arrange this.    If your eye was patched and you were asked to remove the patch yourself, see your healthcare provider. Contact your healthcare provider if you still have pain after the patch is removed.    If you were given a return appointment for patch removal and re-examination, be sure to keep the appointment. Leaving the patch in place longer than advised could be harmful.  When to seek medical advice  Call your healthcare provider right away if any of these occur.    Increasing eye pain or pain that does not improve after 24 hours    Discharge from the eye    Increasing redness of the eye or swelling of the eyelids    Worsening vision    Symptoms get worse after the abrasion has healed  Date Last Reviewed: 7/1/2017 2000-2017 [employer]. [address] [place] [address], [place] [address]. All rights reserved. This information is not intended as a substitute for professional medical care. Always follow your healthcare professional's instructions.

## 2021-06-20 NOTE — PROGRESS NOTES
[facility]   Pelvic Initial Evaluation    Patient Name: Aspen Penn  Date of evaluation: 9/24/2018  Visit #1  Referral Diagnosis: Female stress incontinence  Precautions: 15 weeks pregnant, due 3/16/19  Referring provider: Nanda Rodriguez MD  Visit Diagnosis:     ICD-10-CM    1. Female stress incontinence N39.3    2. Pelvic floor weakness in female N81.89    3. Pregnancy Z34.90        Assessment:     Aspen Penn is a 33 y.o. female who presents to therapy today with chief complaints of urinary incontinence. Onset date of sx was 6/18.  Functional impairments include bladder control, coughing, sneezing.  Clinical findings include pelvic asymmetry, tenderness of ant/post pelvis, normal trunk and hip ROM, decreased pelvic floor strength and endurance.        Pt. is appropriate for skilled PT intervention as outlined in the Plan of Care (POC).  Pt. is a good candidate for skilled PT services to improve pain levels and function.    Goals:  Pt. will demonstrate/verbalize independence in self-management of condition in : 6 weeks  Pt. will be independent with home exercise program in : 6 weeks  Patient will experience no leakage with coughing, sneezing, jumping : in 8-10 weeks  Patient will increase pelvic floor strength : to decrease episodes of urinary incontinence to _/day;in 8-10 weeks;Comment  decrease episodes of urinary incontinence to no more than _ / day: 0-1x    Patient's expectations/goals are realistic.    Barriers to Learning or Achieving Goals:  No Barriers.       Plan / Patient Instructions:        Plan of Care:   Authorization / Certification Number of Visits: pref one  Communication with: Referral Source  Patient Related Instruction: Nature of Condition;Treatment plan and rationale;Self Care instruction;Basis of treatment;Next steps  Times per Week: 1  Number of Weeks: 6-12  Number of Visits: up to 12  Discharge Planning: home program, self management  Precautions / Restrictions  : pregnancy 15 weeks   Therapeutic Exercise: Pelvic Floor retraining;Strengthening  Neuromuscular Reeducation: core  Manual Therapy: myofascial release;strain counterstrain    POC and pathology of condition were reviewed with patient.  Pt. is in agreement with the Plan of Care  A Home Exercise Program (HEP) was initiated today.    Plan for next visit: biofeedback, progression of home program, patient education, manual therapy     Subjective:         Social information:   Living Situation:lives with others    Occupation:physician   Work Status:Working full time   Equipment Available: None    History of Present Illness:    Aspen is a 33 y.o. female who presents to therapy today with complaints of urinary incontinence. Date of onset/duration of symptoms is . Onset was gradual. Symptoms are intermittent and getting worse. She reports   previous  history of similar symptoms. She describes their previous level of function as not limited  Incontinence frequency is 4x/day. Voiding frequency is 8x/day. Voiding 1x times/night. Fluid intake is 32 oz/day.  She reports increased symptoms with 1st trimester of pregnancy. She is having leakage with pregnancy related vomiting, coughing, sneezing, laughing. Nausea has recently improved.  Denies urge symptoms. Reports previous history of mild stress incontinence related to activity/exercise. Reports chronic (B) low back pain. Sx are constant and mild. Had previous PT for it without benefit.     Incontinence ratin/10  Pain Ratin  lumbosacral  Pain description: pain    Functional limitations are described as occurring with:   bowel/bladder conrol  coughing, sneezing, laughing    Past Medical History:   Diagnosis Date     Abnormal Pap smear of cervix      HPV (human papilloma virus) infection      Insomnia      Past Surgical History:   Procedure Laterality Date     LASIK Bilateral      WISDOM TOOTH EXTRACTION            Objective:       Patient Active Problem List    Diagnosis     Pregnancy test positive     Female stress incontinence       Note: Items left blank indicates the item was not performed or not indicated at the time of the evaluation.      Posture Observation:      General standing posture is normal.  Lumbopelvic complex: Pelvic alignment: (L) PSIS ant in standing    Lumbar ROM:  Date:      *Indicate scale AROM AROM AROM   Lumbar Flexion WFL     Lumbar Extension WNL      Right Left Right Left Right Left   Lumbar Sidebending WNL WNL       Lumbar Rotation           Hip ROM  Date:      Hip ROM( ) AROM in degrees AROM in degrees AROM in degrees    Right Left Right Left Right Left   Hip Flexion (0-120 )         Hip Abduction (0-45 )         Hip External Rotation (0-50 )         Hip Internal Rotation (0-40 )         Hip Extension (0-15 )          PROM in degrees PROM in degrees PROM in degrees    Right Left Right Left Right Left   Hip Flexion (0-120 ) WNL WNL       Hip Abduction (0-45 ) WNL WNL       Hip External Rotation (0-50 ) WNL WNL       Hip Internal Rotation (0-40 ) WNL WNL       Hip Extension (0-15 )           Palpation: mod tenderness noted pubic joint, (L) sacrotub lig,     External Exam  Gapping:   Skin Integrity:  Introitus normal  Pelvic Clock: no tenderness noted on external palpation.     Internal Exam  Sensation:   Muscle tone: normal  Strength - Vaginal   MMT: 4   Endurance: 9 sec   Repetitions: 5    Palpation  NA today      Treatment Today     TREATMENT MINUTES COMMENTS   Evaluation 35 Patient consents to internal exam/treatment.   Self-care/ Home management     Manual therapy 10 Induction, indirect, direct techniques utilized as appropriate for optimal tissue release.   MFR/SCS - BSPH-V, (L) EPF-V   Neuromuscular Re-education     Therapeutic Activity     Therapeutic Exercises 10 Discussed fluid intake, voiding schedule.  Plan of care and goals developed in collaboration with patient.   Discussed findings, anatomy, instructed in exercises.  Exercises  per flow sheet.    Gait training     Modality__________________                Total 55    Blank areas are intentional and mean the treatment did not include these items.     PT Evaluation Code: (Please list factors)  Patient History/Comorbidities: pregnancy  Examination: 3  Clinical Presentation: uncomplicated  Clinical Decision Making: low    Patient History/  Comorbidities Examination  (body structures and functions, activity limitations, and/or participation restrictions) Clinical Presentation Clinical Decision Making (Complexity)   No documented Comorbidities or personal factors 1-2 Elements Stable and/or uncomplicated Low   1-2 documented comorbidities or personal factor 3 Elements Evolving clinical presentation with changing characteristics Moderate   3-4 documented comorbidities or personal factors 4 or more Unstable and unpredictable High              Hattie ACEVES Evin  9/24/2018  8:32 AM

## 2021-06-20 NOTE — PROGRESS NOTES
[facility] Daily Progress     Patient Name: Aspen Penn  Date: 10/3/2018  Visit #2  Referral Diagnosis: Female stress incontinence  Precautions: 15 weeks pregnant, due 3/16/19  Referring provider: Nanda Rodriguez MD  Visit Diagnosis:     ICD-10-CM    1. Female stress incontinence N39.3    2. Pelvic floor weakness in female N81.89    3. Pregnancy Z34.90          Assessment:     HEP/POC compliance is  good .  Patient demonstrates understanding/independence with home program.  Response to Intervention good  Patient is benefitting from skilled physical therapy and is making steady progress toward functional goals.  Patient is appropriate to continue with skilled physical therapy intervention, as indicated by initial plan of care.    Goal Status:  Pt. will demonstrate/verbalize independence in self-management of condition in : 6 weeks  Pt. will be independent with home exercise program in : 6 weeks  Patient will experience no leakage with coughing, sneezing, jumping : in 8-10 weeks  Patient will increase pelvic floor strength : to decrease episodes of urinary incontinence to _/day;in 8-10 weeks;Comment  decrease episodes of urinary incontinence to no more than _ / day: 0-1x    Plan / Patient Education:     Continue with initial plan of care.  Progress with home program as tolerated.    Subjective:     Pain Ratin  Only 1 episode of leakage since the last session. Bladder was full and she had to sneeze. Has some low back stiffness.       Objective:     Mod/major tenderness (B) ant pubis, pubic jt  PF recruitment is good initially. Endurance is fair.    Treatment Today     TREATMENT MINUTES COMMENTS   Evaluation     Self-care/ Home management     Manual therapy 23 Induction, indirect, direct techniques utilized as appropriate for optimal tissue release.   MFR/SCS - (L) hemipelvis, (L) AR2-3-MS, BSPH-V,    Neuromuscular Re-education     Therapeutic Activity     Therapeutic Exercises 30 Exercises per  flow sheet.   Added roll for control. Issued band. PF exercise w/BFB.   Gait training     Modality__________________                Total 53    Blank areas are intentional and mean the treatment did not include these items.       Hattie Cleary  10/3/2018

## 2021-06-20 NOTE — PROGRESS NOTES
PRENATAL VISIT   FIRST OBSTETRICAL EXAM - OB    Assessment / Impression     IUP  Normal first prenatal visit at 10w6d  Discussed orientation, general information, lifestyle, nutrition, exercise,warning signs, resources, lab testing, risk screening and discussed cystic fibrosis screening with patient.  Questions answered.    Plan:     Routine cares   Initial labs drawn.  Prenatal vitamins.  Problem list reviewed and updated.  Genetic screening test options discussed:  Patient elects Cell free DNA test  Role of ultrasound in pregnancy discussed; fetal survey: requested.  Follow up: Return in 4 weeks (on 2018).      Subjective:    Aspen Penn is a 33 y.o.  here today for her First Obstetrical Exam.   OB History    Para Term  AB Living   1 0 0 0 0 0   SAB TAB Ectopic Multiple Live Births   0 0 0 0 0      # Outcome Date GA Lbr Kj/2nd Weight Sex Delivery Anes PTL Lv   1 Current                   Expected Date of Delivery: 3/16/2019, by Ultrasound    Past Medical History:   Diagnosis Date     Abnormal Pap smear of cervix      HPV (human papilloma virus) infection      Insomnia      Past Surgical History:   Procedure Laterality Date     LASIK Bilateral      WISDOM TOOTH EXTRACTION       Social History   Substance Use Topics     Smoking status: Never Smoker     Smokeless tobacco: Never Used     Alcohol use 2.4 - 3.0 oz/week     4 - 5 Cans of beer per week     Current Outpatient Prescriptions   Medication Sig Dispense Refill     melatonin 3 mg Tab tablet Take 3-15 mg by mouth at bedtime as needed.       prenatal no.82/iron/folate no2 (TL FOLATE ORAL) Take by mouth.       prenatal vitamin iron-folic acid 27mg-0.8mg (PRENATAL S) 27 mg iron- 800 mcg Tab tablet Take 1 tablet by mouth daily.       zolpidem (AMBIEN) 5 MG tablet Take 2.5 mg by mouth daily.       No current facility-administered medications for this visit.      No Known Allergies          High Risk Behavior: None    Review of  Systems  General:  Denies problem  Eyes: Denies problem  Ears/Nose/Throat: Denies problem  Cardiovascular: Denies problem  Respiratory:  Denies problem  Gastrointestinal:  Denies problem, Genitourinary: Denies problem  Musculoskeletal:  Denies problem  Skin: Denies problem  Neurologic: Denies problem  Psychiatric: Denies problem  Endocrine: Denies problem  Heme/Lymphatic: Denies problem   Allergic/Immunologic: Denies problem       Objective:   Objective    Vitals:    08/24/18 1507   BP: 102/68   Pulse: 68   Weight: 148 lb (67.1 kg)     Physical Exam:  General Appearance: Alert, cooperative, no distress, appears stated age  Head: Normocephalic, without obvious abnormality, atraumatic  Eyes: PERRL, conjunctiva/corneas clear, EOM's intact  Ears: Normal TM's and external ear canals, both ears  Nose: Nares normal, septum midline,mucosa normal, no drainage  Throat: Lips, mucosa, and tongue normal; teeth and gums normal  Neck: Supple, symmetrical, trachea midline, no adenopathy;  thyroid: not enlarged, symmetric, no tenderness/mass/nodules; no carotid bruit or JVD  Back: Symmetric, no curvature, ROM normal, no CVA tenderness  Lungs: Clear to auscultation bilaterally, respirations unlabored  Breasts: No breast masses, tenderness, asymmetry, or nipple discharge.  Heart: Regular rate and rhythm, S1 and S2 normal, no murmur, rub, or gallop, Abdomen: Soft, non-tender, bowel sounds active all four quadrants,  no masses, no organomegaly  Pelvic:Normally developed genitalia with no external lesions or eruptions. Vagina and cervix show no lesions, inflammation, discharge or tenderness. No cystocele, No rectocele. Uterus retroflexed.  No adnexal mass or tenderness.    Extremities: Extremities normal, atraumatic, no cyanosis or edema  Skin: Skin color, texture, turgor normal, no rashes or lesions  Lymph nodes: Cervical, supraclavicular, and axillary nodes normal  Neurologic: Normal     Lab:   Results for orders placed or performed in  visit on 07/25/18   Beta-hCG, Quantitative   Result Value Ref Range    Beta-hCG, Quantitative 66397 (H) 0 - 4 mlU/mL

## 2021-06-20 NOTE — PROGRESS NOTES
[facility] Daily Progress     Patient Name: Aspen Penn  Date: 10/10/2018  Visit #3  Referral Diagnosis: Female stress incontinence  Precautions: 15 weeks pregnant, due 3/16/19  Referring provider: Nanda Rodriguez MD  Visit Diagnosis:     ICD-10-CM    1. Female stress incontinence N39.3    2. Pelvic floor weakness in female N81.89    3. Pregnancy Z34.90          Assessment:     HEP/POC compliance is  good .  Patient demonstrates understanding/independence with home program.  Response to Intervention good  Patient is benefitting from skilled physical therapy and is making steady progress toward functional goals.  Patient is appropriate to continue with skilled physical therapy intervention, as indicated by initial plan of care.    Goal Status:  Pt. will demonstrate/verbalize independence in self-management of condition in : 6 weeks;Progressing toward  Pt. will be independent with home exercise program in : 6 weeks;Progressing toward  Patient will experience no leakage with coughing, sneezing, jumping : in 8-10 weeks;Progressing toward  Patient will increase pelvic floor strength : to decrease episodes of urinary incontinence to _/day;in 8-10 weeks;Comment;Progressing toward  decrease episodes of urinary incontinence to no more than _ / day: 0-1x    Plan / Patient Education:     Continue with initial plan of care.  Progress with home program as tolerated.    Subjective:     Pain Ratin  Doing well. Some leakage with sneezing a couple times. No other issues. Low back is better. Didn't do the new exercise.       Objective:     Mod tenderness of (B) ant pubis, pubic jt.   Good tender pt resolution with manual therapy.  PF endurance is fair.     Treatment Today     TREATMENT MINUTES COMMENTS   Evaluation     Self-care/ Home management     Manual therapy 25 Induction, indirect, direct techniques utilized as appropriate for optimal tissue release.   MFR/SCS - (L) AR2-3-MS, BSPH-V, pelvic diaphragm,     Neuromuscular Re-education     Therapeutic Activity     Therapeutic Exercises 25 Exercises per flow sheet. PF exercise w/BFB.   Gait training     Modality__________________                Total 50    Blank areas are intentional and mean the treatment did not include these items.       Hattie Cleary  10/10/2018

## 2021-06-21 NOTE — PROGRESS NOTES
[facility] Daily Progress     Patient Name: Aspen Penn  Date: 10/24/2018  Visit #4  Referral Diagnosis: Female stress incontinence  Precautions: 15 weeks pregnant, due 3/16/19  Referring provider: Nanda Rodriguez MD  Visit Diagnosis:     ICD-10-CM    1. Female stress incontinence N39.3    2. Pelvic floor weakness in female N81.89    3. Pregnancy Z34.90          Assessment:     HEP/POC compliance is  good .  Patient demonstrates understanding/independence with home program.  Response to Intervention good  Patient is benefitting from skilled physical therapy and is making steady progress toward functional goals.  Patient is appropriate to continue with skilled physical therapy intervention, as indicated by initial plan of care.    Goal Status:  Pt. will demonstrate/verbalize independence in self-management of condition in : 6 weeks;Progressing toward  Pt. will be independent with home exercise program in : 6 weeks;Progressing toward  Patient will experience no leakage with coughing, sneezing, jumping : in 8-10 weeks;Progressing toward  Patient will increase pelvic floor strength : to decrease episodes of urinary incontinence to _/day;in 8-10 weeks;Comment;Progressing toward  decrease episodes of urinary incontinence to no more than _ / day: 0-1x    Plan / Patient Education:     Continue with initial plan of care.  Progress with home program as tolerated.    Subjective:     Pain Ratin  Doing well. No leakage in the last 2 weeks. No significant low back pain. Has chronic SI issues.       Objective:     Mod tenderness (L) ant pubis, pubic jt.     Treatment Today     TREATMENT MINUTES COMMENTS   Evaluation     Self-care/ Home management     Manual therapy 30 Induction, indirect, direct techniques utilized as appropriate for optimal tissue release.   MFR/SCS - pelvic diaphragm, (L) AR3-MS, BSPH-V, supine LS traction, iliac gap,    Neuromuscular Re-education     Therapeutic Activity      Therapeutic Exercises 10 Discussed sit/stand/walking progression with PF strengthening. Discussed maternity SI loc option for managing SI pain for duration of pregnancy. Patient is interested in buying one.    Gait training     Modality__________________                Total 40    Blank areas are intentional and mean the treatment did not include these items.       Hattie Cleary  10/24/2018

## 2021-06-21 NOTE — PROGRESS NOTES
[facility] Daily Progress     Patient Name: Aspen Penn  Date: 2018  Visit #5  Referral Diagnosis: Female stress incontinence  Precautions: 15 weeks pregnant, due 3/16/19  Referring provider: Nanda Rodriguez MD  Visit Diagnosis:     ICD-10-CM    1. Female stress incontinence N39.3    2. Pelvic floor weakness in female N81.89    3. Pregnancy Z34.90          Assessment:     HEP/POC compliance is  good .  Patient demonstrates understanding/independence with home program.  Response to Intervention good with bladder control  Patient is appropriate to continue with skilled physical therapy intervention, as indicated by initial plan of care.    Goal Status:  Pt. will demonstrate/verbalize independence in self-management of condition in : 6 weeks;Progressing toward  Pt. will be independent with home exercise program in : 6 weeks;Progressing toward  Patient will experience no leakage with coughing, sneezing, jumping : in 8-10 weeks;Progressing toward  Patient will increase pelvic floor strength : to decrease episodes of urinary incontinence to _/day;in 8-10 weeks;Comment;Progressing toward  decrease episodes of urinary incontinence to no more than _ / day: 0-1x    Plan / Patient Education:     Continue with initial plan of care.  Progress with home program as tolerated.   Reassess SI, pelvic girdle dysfunction.     Subjective:     Pain Ratin  Doing well. No leakage with sneezing.Feeling more confident with the kegels and is seeing a difference. (B) SI joint is really bothering her. Increased pain with walking, some bending movement.  Bought an SI loc but finds it uncomfortable.       Objective:     (R) PSIS post in standing. (+) FB test (R).   Decreased pain with SI distraction test.     Treatment Today     TREATMENT MINUTES COMMENTS   Evaluation     Self-care/ Home management 8 Review/instruct on SI Loc wear, donning in supine. May need to switch to a maternity SI loc.   Manual therapy 45  Induction, indirect, direct techniques utilized as appropriate for optimal tissue release.   MFR/SCS -  (R) FST(P), sacral release, ISS (P), supine LS traction, (L) sacral plexus, SL SI distraction,    Neuromuscular Re-education     Therapeutic Activity     Therapeutic Exercises     Gait training     Modality__________________                Total 53    Blank areas are intentional and mean the treatment did not include these items.       Hattie Cleary  11/7/2018

## 2021-06-22 NOTE — PROGRESS NOTES
[facility] Daily Progress     Patient Name: Aspen Penn  Date: 2018  Visit #6  Referral Diagnosis: Female stress incontinence  Precautions: 15 weeks pregnant, due 3/16/19  Referring provider: Nanda Rodriguez MD  Visit Diagnosis:     ICD-10-CM    1. Female stress incontinence N39.3    2. Pelvic floor weakness in female N81.89    3. Pregnancy Z34.90          Assessment:     HEP/POC compliance is  good .  Patient demonstrates understanding/independence with home program.  Patient is appropriate to continue with skilled physical therapy intervention, as indicated by initial plan of care.    Goal Status:  Pt. will demonstrate/verbalize independence in self-management of condition in : 6 weeks;Progressing toward  Pt. will be independent with home exercise program in : 6 weeks;Progressing toward    Patient will experience no leakage with coughing, sneezing, jumping : in 8-10 weeks;Progressing toward  Patient will increase pelvic floor strength : to decrease episodes of urinary incontinence to _/day;in 8-10 weeks;Comment;Progressing toward  decrease episodes of urinary incontinence to no more than _ / day: 0-1x      Plan / Patient Education:     Continue with initial plan of care.  Progress with home program as tolerated.   Contacted referring provider for additional orders including LS/SI pain.     Subjective:     Pain Ratin  Returns after 4-5 weeks. Bladder control has been good. Having a lot of sacral pain. Hurts when turning in bed, rising from sitting, walking. Having more tightness, pain in paraspinals as pregnancy progresses. SI belt doesn't fit right now.       Objective:     (L) PSIS ant in standing. (L) FB test (+).     Treatment Today     TREATMENT MINUTES COMMENTS   Evaluation     Self-care/ Home management     Manual therapy 40 Induction, indirect, direct techniques utilized as appropriate for optimal tissue release.   MFR/SCS - SL SI distraction, LS traction, (L) LPL5, (L)  HFO-SI,    Neuromuscular Re-education     Therapeutic Activity     Therapeutic Exercises 15 Patient educated in tape wear and issued handout.   KT - (B) SI joint  Exercises per flow sheet. Issued handouts.    Gait training     Modality__________________                Total 55    Blank areas are intentional and mean the treatment did not include these items.       Hattie Cleary  12/12/2018    [facility] Discharge Summary  Patient Name: Aspen Penn  Date: 1/25/2019  Referral Diagnosis: [unfilled]  Referring provider: Nanda Rodriguez MD  Visit Diagnosis:   1. Female stress incontinence     2. Pelvic floor weakness in female     3. Pregnancy         Goals:  Pt. will demonstrate/verbalize independence in self-management of condition in : 6 weeks;Partially Met  Pt. will be independent with home exercise program in : 4 weeks    Patient will experience no leakage with coughing, sneezing, jumping : in 8-10 weeks;Met  Patient will increase pelvic floor strength : to decrease episodes of urinary incontinence to _/day;in 8-10 weeks;Comment;Met  decrease episodes of urinary incontinence to no more than _ / day: 0-1x  Pt will: note 50% reduction in SI symptoms at the end of her work day in 6 weeks  Pt will: redce STACIE by 20% in 8weeks      Patient was seen for 6 visits from 9/24/18 to 12/12/18 with no missed appointments.  The patient reports feeling better and did not wish to schedule further therapy at this time.  Patient received a home program for pelvic floor strengthening.   No further therapy is required at this time.    Therapy will be discontinued at this time.  The patient will need a new referral to resume.    Thank you for your referral.  Hattie Cleary  1/25/2019  4:38 PM

## 2021-06-22 NOTE — PATIENT INSTRUCTIONS - HE
Patient Education   HEALTHY PREGNANCY CARE: 26-30 WEEKS PREGNANT    You are now in your last trimester of pregnancy. Your baby is growing rapidly, can open and close her eyelids, sometimes get hiccups, and you'll probably feel her moving around more often. Your baby has breathing movements and is gaining about one ounce each day. You may notice heartburn and leg cramps. Your back may ache as your body gets used to your baby's size and length.    Discuss your work situation with your midwife or physician as needed. If you stand for long periods of time, you may need to make changes and take breaks.    Pre-register online at the hospital where your baby will be born ([email or web address])     Be aware of your baby's activity level. You may be asked to do daily fetal movement counts. Contact your midwife or physician about any decreased movement.    You may have been tested for gestational diabetes today. If you are RH negative, you may have had an additional test and a Rhogam injection.    Consider receiving a Tdap vaccination to protect your baby from Pertussis/whooping cough.    If you are considering tubal ligation, discuss this with your midwife or physician. You will need to have an appointment with the obstetrician who will do the surgery to discuss the risks, benefits, and alternatives, and to sign the consent. This can be discussed at your next visit.    Continue to watch for any signs or symptoms of premature labor:     Regular contractions. This means having about 6 or more within 1 hour, even after you have had a glass of water and are resting.     A backache that starts and stops regularly.    An increase or change in vaginal discharge, such as heavy, mucus-like, watery, or bloody discharge.     Your water breaks or leaks.    Continue to watch for signs and symptoms of preeclampsia:     Sudden swelling of your face, hands, or feet     New vision  problems such as blurring, double vision, or flashing lights    A severe headache not relieved with acetaminophen (Tylenol)    Sharp or stabbing pain in your right or middle upper abdomen    If you have any of the above symptoms or any other concerns, call your midwife or physician or their clinic staff at [facility] FAMILY MEDICINE/OB at Phone: [phone]. If it's after clinic hours, physician patients should call the Care Connection at [phone]); midwife patients should call their answering service at [phone].    How can you care for yourself at home?   You can refer to the Starting Out Right book or find it online at http://www.[facility].org/images/stories/maternity/[facility]-[email or web address]-Right.pdf or http://www.[employer].org/images/stories/flipbooks/[facility]-starting-out-right/[facility]-starting-out-right.html#p=8     You can sign up for a weekly parenting e-mail that gives support, tips and advice from health care professionals that starts with pregnancy and continues through the toddler years. To register, go to [email or web address] at any time during your pregnancy.      Baby Feeding in the Hospital: Information, Support and Resources    As you prepare for the birth of your child, you will want to consider options for feeding your baby including breast-feeding and/or baby formula. The American Academy of Pediatrics recommends exclusive breast-feeding for the first six months (although any amount of breast-feeding is beneficial).  However, we also understand that breast-feeding is a personal choice and not for everyone. Whether or not you choose to breast-feed, your decision will be respected by our staff.    There are numerous benefits of breast-feeding; here are a few to consider:    Provides antibodies to protect your baby from infections and diseases    The cost: formula can cost over $1,500 per year    Convenience, no warming up or sterilizing bottles and  supplies    The physical contact with breastfeeding can make babies feel secure, warm and comforted    What ever my feeding choice, what can I expect after I deliver my baby?    Your baby will usually be placed skin-to-skin immediately following birth. The skin to skin contact between you and your baby will be a special and memorable time. The bonding and attachment comforts your baby and has a positive effect on baby s brain development.     Having your baby  room in  with you also helps you start to learn your baby s body rhythms and sleep cycle.      You will also begin to learn your baby s cues (signals) that he or she is ready to feed.    When do I start to feed my baby?  As soon as possible after your baby s birth, you will be encouraged to begin feeding.  In the first couple of weeks, your baby will eat often.  Breastfeeding babies usually eat at least 8 times in 24 hours.  Babies fed formula usually eat at least 7 times in 24 hours.      Breast-feeding tips:    Get comfortable and use pillows for support.    Have your baby at the level of your breast, facing you,  tummy to tummy .      Touch your nipple to your baby s lips so you baby s mouth opens wide (rooting reflex).  Aim the nipple toward the roof of your baby s mouth. When your baby opens his or her mouth, pull your baby toward your breast to help your baby  latch on  to your nipple and much of the areola area.    Hand expressing your breast milk can assist with latching your baby to your breast, if needed.    Ask for help, breastfeeding may seem awkward or uncomfortable at first, this is normal. There are numerous resources available at [facility] and beyond.     If your goal is to exclusively breastfeed, avoid using any formula or artificial nipples (including bottles and pacifiers) while you are your baby are learning to breastfeed unless there is a medical reason.       Mixing breastfeeding and formula can interfere with how you  begin building your milk supply.  It can impact how you and your baby  learn  to breastfeeding together and alter the natural growth of  good  bacteria in your baby s stomach.    Delay a pacifier or a bottle in the first few weeks until breastfeeding is well established. This is often around 3 weeks of age.    Ask your nurse to show you how to hand express.   Breast milk can be kept in the refrigerator or freezer for later use.  (over)  Hospital Resources:  [facility] Lactation Clinics located at [facility], [facility] and [facility]  Call: [phone].    Inpatient support    Outpatient appointments    Telephone consultation    Breast-feeding classes available through [employer]      Online Resources:    [email or web address] sign up for free online weekly e-mail    [employer]/maternity    Breastfeedingmadesimple.com    [employer] (La Leche League)    Normalfed.com    Womenshealth.gov/breastfeeding    Workandpump.com    Breast-feeding Supplies & Pumps:  Talk to your insurance provider or WIC (Women, Infants and Children) to learn more about options available to you. Recent health insurance changes may include additional coverage for supplies and pumps.    Public Health:  Women, Infants and Children Nutrition program (WIC): provides breast-feeding support and education in addition to formal feeding moms. 179-WFO-1892 or http://www.health.[facility].mn.us/divs/fh/wic    Family Health Home Visiting: Public [facility] Nurse home visits are available. Talk to your provider to see if you qualify. Most [facility] have a program available.    Additional Resources:  La Leche League is an international, nonprofit, nonsectarian organization offering information, education, and support to mothers who want to breast-feed their babies. Local groups offer phone help and monthly meetings. Visit [employer] or [employer].org and us the  Find local support  drop down menu or click on the  Resources   tab.    [place] Breastfeeding Resources: 4-251-305-BABY (7300) toll free    National Breastfeeding Help Line trained breastfeeding peer counselors can help answer common breast-feeding questions by phone. Monday-Friday: English/Nepali  2-139- [phone] toll free, 1-216.497.2786 (TTY)    [facility] Connection: [phone] ([phone])     Patient Education   HEALTHY PREGNANCY CARE: 30-34 WEEKS PREGNANT    You have made it to the final months of your pregnancy. By now, your baby is starting to fill out with some fat under his skin, fuzzy hair on his shoulders, and is gaining 4 to 6 ounces per week.    Discuss any travel plans with your midwife or physician.    Review possible changes in sexuality during later pregnancy and discuss these with your midwife or physician, as well as your partner. Alternative love-making positions may be more comfortable.    Discuss labor and delivery issues with your midwife or physician. If you had a  birth in the past, discuss a trial of labor with your midwife or physician. He or she may ask that you sign a consent form, if you wish to have a vaginal birth after  (). Ask your midwife or physician to explain your options for managing pain during your labor and delivery. Sometimes, during the birth process, an episiotomy may need to be cut in the vagina to make the opening bigger or let the baby come out quicker. You may want to discuss the episiotomy and how often it is needed with your midwife or physician.    Plan for your baby's care by selecting a child health care provider (Family physician, Pediatrician, or Pediatric Nurse Practitioner). Practice installing an infant car seat correctly in the car. Ask for car seat information as needed and make sure it is safe and will work in the car your baby will ride in. You will need a car seat to bring your baby home from the hospital. Check the procedure for adding your baby to your health care plan. Review your  decision about circumcision and ask for any information you need. As you buy and receive items for your baby, don't put a baby walker on your list. Walkers can be dangerous and can cause serious injury to your child. A safer option is a saucer-type play station, since it doesn't allow baby to travel across the floor.    Discuss your choices and plans for birth control with your midwife or physician. Women who are breastfeeding can still become pregnant. Use a birth control method if you want to lower your pregnancy risk. Talk to your midwife or physician if you are considering permanent birth control, such as tubal ligation or Essure. You may need to complete a consent form 30 days prior to delivery in order to have this done after you deliver.    Continue to watch for signs and symptoms of preeclampsia:     Sudden swelling of your face, hands, or feet     New vision problems such as blurring, double vision, or flashing lights    A severe headache not relieved with acetaminophen (Tylenol)    Sharp or stabbing pain in your right or middle upper abdomen    Watch for signs and symptoms of premature labor:     Regular contractions. This means having about 6 or more within 1 hour, even after you have had a glass of water and are resting.     A backache that starts and stops regularly.    An increase or change in vaginal discharge, such as heavy, mucus-like, watery, or bloody discharge.     Your water breaks or leaks.    If you have any of the above symptoms or any other concerns, call your provider or their clinic staff at [facility] FAMILY MEDICINE/OB at Phone: [phone]. If it's after clinic hours, physician patients should call the Care Connection at [phone]); midwife patients should call their answering service at [phone].    How can you care for yourself at home?   You can refer to the Starting Out Right book or find it online at  http://www.[facility].org/images/stories/maternity/[facility]-Starting-Out-Right.pdf or http://www.[facility].org/images/stories/flipbooks/[facility]-starting-out-right/[facility]-starting-out-right.html#p=8     You can sign up for a weekly parenting e-mail that gives support, tips and advice from health care professionals that starts with pregnancy and continues through the toddler years. To register, go to [email or web address] at any time during your pregnancy.

## 2021-06-23 NOTE — PATIENT INSTRUCTIONS - HE
Patient Education   HEALTHY PREGNANCY CARE: 30-34 WEEKS PREGNANT    You have made it to the final months of your pregnancy. By now, your baby is starting to fill out with some fat under his skin, fuzzy hair on his shoulders, and is gaining 4 to 6 ounces per week.    Discuss any travel plans with your midwife or physician.    Review possible changes in sexuality during later pregnancy and discuss these with your midwife or physician, as well as your partner. Alternative love-making positions may be more comfortable.    Discuss labor and delivery issues with your midwife or physician. If you had a  birth in the past, discuss a trial of labor with your midwife or physician. He or she may ask that you sign a consent form, if you wish to have a vaginal birth after  (). Ask your midwife or physician to explain your options for managing pain during your labor and delivery. Sometimes, during the birth process, an episiotomy may need to be cut in the vagina to make the opening bigger or let the baby come out quicker. You may want to discuss the episiotomy and how often it is needed with your midwife or physician.    Plan for your baby's care by selecting a child health care provider (Family physician, Pediatrician, or Pediatric Nurse Practitioner). Practice installing an infant car seat correctly in the car. Ask for car seat information as needed and make sure it is safe and will work in the car your baby will ride in. You will need a car seat to bring your baby home from the hospital. Check the procedure for adding your baby to your health care plan. Review your decision about circumcision and ask for any information you need. As you buy and receive items for your baby, don't put a baby walker on your list. Walkers can be dangerous and can cause serious injury to your child. A safer option is a saucer-type play station, since it doesn't allow baby to travel across the floor.    Discuss your choices and  plans for birth control with your midwife or physician. Women who are breastfeeding can still become pregnant. Use a birth control method if you want to lower your pregnancy risk. Talk to your midwife or physician if you are considering permanent birth control, such as tubal ligation or Essure. You may need to complete a consent form 30 days prior to delivery in order to have this done after you deliver.    Continue to watch for signs and symptoms of preeclampsia:     Sudden swelling of your face, hands, or feet     New vision problems such as blurring, double vision, or flashing lights    A severe headache not relieved with acetaminophen (Tylenol)    Sharp or stabbing pain in your right or middle upper abdomen    Watch for signs and symptoms of premature labor:     Regular contractions. This means having about 6 or more within 1 hour, even after you have had a glass of water and are resting.     A backache that starts and stops regularly.    An increase or change in vaginal discharge, such as heavy, mucus-like, watery, or bloody discharge.     Your water breaks or leaks.    If you have any of the above symptoms or any other concerns, call your provider or their clinic staff at [facility] FAMILY MEDICINE/OB at Phone: [phone]. If it's after clinic hours, physician patients should call the Care Connection at [phone]); midwife patients should call their answering service at [phone].    How can you care for yourself at home?   You can refer to the Starting Out Right book or find it online at http://www.[facility].org/images/stories/maternity/[facility]-Starting-Out-Right.pdf or http://www.[facility].org/images/stories/flipbooks/[facility]-starting-out-right/[facility]-starting-out-right.html#p=8     You can sign up for a weekly parenting e-mail that gives support, tips and advice from health care professionals that starts with pregnancy and continues through the toddler years. To  register, go to [email or web address] at any time during your pregnancy.

## 2021-06-23 NOTE — PROGRESS NOTES
[facility]   [facility]-Pelvic Initial Evaluation    Patient Name: Aspen Penn  Date of evaluation: 1/16/2019  Referral Diagnosis: SI (sacroiliac) joint dysfunction  Referring provider: Nanda Rodriguez MD  Visit Diagnosis:     ICD-10-CM    1. Sacral pain M53.3    2. Pain in thoracic spine M54.6        Assessment:    Patient presenting with B SI pain and R> L thoracic pain secondary to advancing pregnancy. The patient is currently 31 + 4 gestation. 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 PT exam revealed level pelvis, decreased ER of B hips,  negative cervical and lumbar screenings. PT initiated K-tape trial, swiss ball exercises and education on eval findings. PT established goals in agreement with the patient.  Vonnienet can follow-up with Beti or Laurie if therapist is not here at the time of follow-up.   Pt. is appropriate for skilled PT intervention as outlined in the Plan of Care (POC).  Pt. is a good candidate for skilled PT services to improve pain levels and function.    Goals:  Pt. will demonstrate/verbalize independence in self-management of condition in : 6 weeks;Partially Met  Pt. will be independent with home exercise program in : 4 weeks    Patient will experience no leakage with coughing, sneezing, jumping : in 8-10 weeks;Met  Patient will increase pelvic floor strength : to decrease episodes of urinary incontinence to _/day;in 8-10 weeks;Comment;Met  decrease episodes of urinary incontinence to no more than _ / day: 0-1x  Pt will: note 50% reduction in SI symptoms at the end of her work day in 6 weeks  Pt will: redce STACIE by 20% in 8weeks      Patient's expectations/goals are realistic.    Barriers to Learning or Achieving Goals:  No Barriers.       Plan / Patient Instructions:        Plan of Care:   Patient Related Instruction: Nature of  Condition;Treatment plan and rationale;Posture;Basis of treatment  Times per Week: 1  Number of Weeks: 12  Number of Visits: 12  Discharge Planning: to independent home program and self care  Therapeutic Exercise: Stretching;Strengthening;ROM  Neuromuscular Reeducation: posture;kinesio tape  Manual Therapy: soft tissue mobilization;myofascial release;joint mobilization  Modalities: hot pack      Plan for next visit: progress swiss ball, follow-up on K-tape     Subjective:           History of Present Illness:    Aspen is a 33 y.o. female who presents to therapy today with complaints of B SI pain that worsened since 10/2018. She denies any history of previous injury to her LB. She is currently 31  + 4 weeks currently pregnant. 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 She has noted since working out at the gym starting in  she does feel better. She is not noting any popping or clicking in her pelvis.  At the gym she is in the pool and performing yoga. Seh also walks the dog with her .    Heat discussed using on the upper portions    Worse with: walking, poor reclined position transitioning out of it , stool sitting at work.   Better with: pillow and better with exercise        Pain Ratin  Pain rating at best: 0  Pain rating at worst: 3 in the moment  Pain description: pain    Functional limitations are described as occurring with:   walking 45  minutes    Patient reports benefit from:  movement or exercise          Objective:      Note: Items left blank indicates the item was not performed or not indicated at the time of the evaluation.    Patient Outcome Measures :    Modified Oswestry Low Back Pain Disablity Questionnaire  in %: 30     Scores range from 0-100%, where a score of 0% represents minimal pain and maximal function. The minimal clinically important difference is a score reduction of 12%.    Examination  1. Sacral pain     2. Pain in thoracic spine       Involved side: Bilateral  Posture  Observation:      General sitting posture is  normal.    Lumbar ROM:    Date: 1/16/19     *Indicate scale AROM AROM AROM   Lumbar Flexion 0 cm finger tip to floor, no pain     Lumbar Extension No pain, no loss      Right Left Right Left Right Left   Lumbar Sidebending No pain no loss No pain   No loss       Lumbar Rotation         Cervical flexion 46     Cervical extension Beyond functional limits                         Lower Extremity Strength:     Date: 1/16/19     LE strength/5 Right Left Right Left Right Left   Hip Flexion (L1-3) 5 5       Hip Extension (L5-S1)         Hip Abduction (L4-5) 5 5       Hip Adduction (L2-3) 5 5       Hip External Rotation         Hip Internal Rotation         Knee Extension (L3-4) 5 5       Knee Flexion 5 5       Ankle Dorsiflexion (L4-5) 5 5       Great Toe Extension (L5) 5 5       Ankle Plantar flexion (S1) 5 5       Abdominals        Sensation    Equal and normal B      Reflex Testing  Lumbar Dermatomes Right Left UE Reflexes Right Left   Iliac Crest and Groin (L1)   Biceps (C5-6)     Anterior Medial Thigh (L2)   Brachioradialis (C5-6)     Anterior Thigh, Medial Epicondyle Femur (L3)   Triceps (C7-8)     Lateral Thigh, Anterior Knee, Medial Leg/Malleolus (L4)   Ger s test - -   Lateral Leg, Dorsal Foot (L5)   LE Reflexes     Lateral Foot (S1)   Patellar (L3-4)     Posterior Leg (S2)   Achilles (S1-2)     Other:   Babinski Response       Palpation: Thoracic paraspinals tone B    Lumbar Special Tests:     Lumbar Special Tests Right Left SI Tests Right  Left   Quadrant test   SI Compression     Straight leg raise 90- 90- SI Distraction     Crossover response - - POSH Test     Slump - - Sacral Thrust     Sit-up test  FADIR     Trunk extensor endurance test  Resisted Abduction + +   Prone instability test  Other: ANGLE - -   Pubic shotgun  Other:       Repeated Motion Testing:  Does not peripheralize    Passive Mobility - Joint Integrity:  loss of ER, excessive IR B    LE  Screen:  Not indicated.    Treatment Today     TREATMENT MINUTES COMMENTS   Evaluation 20 Lumbar and thoracic   Self-care/ Home management     Manual therapy     Neuromuscular Re-education 5 K-tape to LB 2- 2 inch I strips to LB 75% stretch, Patient educated on wear and care   Therapeutic Activity     Therapeutic Exercises 25 See flow sheet  Educated on eval findings and PT POC and goals for therapy   Gait training     Modality__________________                Total 50    Blank areas are intentional and mean the treatment did not include these items.       PT Evaluation Code: (Please list factors)  Patient History/Comorbidities:   Patient Active Problem List   Diagnosis     Pregnancy test positive     Female stress incontinence       Examination: lumbar  Clinical Presentation: stable  Clinical Decision Making: low    Patient History/  Comorbidities Examination  (body structures and functions, activity limitations, and/or participation restrictions) Clinical Presentation Clinical Decision Making (Complexity)   No documented Comorbidities or personal factors 1-2 Elements Stable and/or uncomplicated Low   1-2 documented comorbidities or personal factor 3 Elements Evolving clinical presentation with changing characteristics Moderate   3-4 documented comorbidities or personal factors 4 or more Unstable and unpredictable High              Marilee Carrington  1/16/2019  7:24 AM

## 2021-06-23 NOTE — PATIENT INSTRUCTIONS - HE
Patient Education   HEALTHY PREGNANCY CARE: 30-34 WEEKS PREGNANT    You have made it to the final months of your pregnancy. By now, your baby is starting to fill out with some fat under his skin, fuzzy hair on his shoulders, and is gaining 4 to 6 ounces per week.    Discuss any travel plans with your midwife or physician.    Review possible changes in sexuality during later pregnancy and discuss these with your midwife or physician, as well as your partner. Alternative love-making positions may be more comfortable.    Discuss labor and delivery issues with your midwife or physician. If you had a  birth in the past, discuss a trial of labor with your midwife or physician. He or she may ask that you sign a consent form, if you wish to have a vaginal birth after  (). Ask your midwife or physician to explain your options for managing pain during your labor and delivery. Sometimes, during the birth process, an episiotomy may need to be cut in the vagina to make the opening bigger or let the baby come out quicker. You may want to discuss the episiotomy and how often it is needed with your midwife or physician.    Plan for your baby's care by selecting a child health care provider (Family physician, Pediatrician, or Pediatric Nurse Practitioner). Practice installing an infant car seat correctly in the car. Ask for car seat information as needed and make sure it is safe and will work in the car your baby will ride in. You will need a car seat to bring your baby home from the hospital. Check the procedure for adding your baby to your health care plan. Review your decision about circumcision and ask for any information you need. As you buy and receive items for your baby, don't put a baby walker on your list. Walkers can be dangerous and can cause serious injury to your child. A safer option is a saucer-type play station, since it doesn't allow baby to travel across the floor.    Discuss your choices and  plans for birth control with your midwife or physician. Women who are breastfeeding can still become pregnant. Use a birth control method if you want to lower your pregnancy risk. Talk to your midwife or physician if you are considering permanent birth control, such as tubal ligation or Essure. You may need to complete a consent form 30 days prior to delivery in order to have this done after you deliver.    Continue to watch for signs and symptoms of preeclampsia:     Sudden swelling of your face, hands, or feet     New vision problems such as blurring, double vision, or flashing lights    A severe headache not relieved with acetaminophen (Tylenol)    Sharp or stabbing pain in your right or middle upper abdomen    Watch for signs and symptoms of premature labor:     Regular contractions. This means having about 6 or more within 1 hour, even after you have had a glass of water and are resting.     A backache that starts and stops regularly.    An increase or change in vaginal discharge, such as heavy, mucus-like, watery, or bloody discharge.     Your water breaks or leaks.    If you have any of the above symptoms or any other concerns, call your provider or their clinic staff at [facility]/OB at Phone: [phone]. If it's after clinic hours, physician patients should call the [facility] at [phone]); midwife patients should call their answering service at [phone].    How can you care for yourself at home?   You can refer to the Starting Out Right book or find it online at [email or web address] or [email or web address]/images/stories/flipbooks/[facility]-starting-out-[email or web address]-starting-out-right.html#p=8     You can sign up for a weekly parenting e-mail that gives support, tips and advice from health care professionals that starts with pregnancy and continues through the toddler years. To  register, go to [email or web address] at any time during your pregnancy.     Patient Education   HEALTHY PREGNANCY CARE: 34-36 WEEKS PREGNANT    Your baby is gaining about an ounce each day, so healthy nutrition and rest are still very important. Learn about late pregnancy symptoms, such as constipation and backaches. Drinking more fluids and eating more fiber can relieve constipation. The pelvic tilt and a heating pad can ease backaches.    Continue to watch for signs and symptoms of preeclampsia:     Sudden swelling of your face, hands, or feet     New vision problems such as blurring, double vision, or flashing lights    A severe headache not relieved with acetaminophen (Tylenol)    Sharp or stabbing pain in your right or middle upper abdomen    You're almost done with your pregnancy but it's still too soon to have your baby. The goal is to have your baby after 37 weeks, so watch for signs and symptoms of premature labor:     Regular contractions. This means having about 6 or more within 1 hour, even after you have had a glass of water and are resting.     A backache that starts and stops regularly.    An increase or change in vaginal discharge, such as heavy, mucus-like, watery, or bloody discharge.     Your water breaks or leaks.    You will be tested for group B streptococcus (GBS), a type of bacteria found in 10-30% of pregnant women. A woman with GBS can pass it to her baby during delivery. Most babies who get GBS from their mothers do not have any problems, but some babies get very ill and need to be hospitalized for antibiotic treatment. Treating you with antibiotics during labor and delivery helps to prevent infection in your baby.    Review information on postpartum care that you will need after the baby is born.  Discuss your choices and plans for birth control with your midwife or physician.     Postpartum Care  During the days and weeks after the delivery of your baby (postpartum period), your body  "will change as it returns to its nonpregnant condition. As with pregnancy changes, postpartum changes are different for every woman.    Physical changes after childbirth  The changes in your body may include:    Contractions called afterpains shrink the uterus for several days after childbirth. Shrinking of the uterus to its prepregnancy size may take 6 to 8 weeks.    Sore muscles (especially in the arms, neck, or jaw) are common after childbirth. This is because of the hard work of labor and pushing your baby out. The soreness should go away in a few days.    Bleeding and vaginal discharge (lochia) may last for 2 to 8 weeks, and can come and go for about 2 months.    Vaginal soreness, including pain, discomfort, and numbness, is common after vaginal birth. Soreness may be worse if you had a perineal tear or episiotomy.    If you had a  birth (), you may have pain in your lower abdomen and may need pain medicine for 1 to 2 weeks.    Breast engorgement is common around the 3rd or 4th day after delivery, when the breasts begin to fill with milk. This can cause discomfort and swelling. If you are breast feeding, the best treatment is to feed your baby often or pump the milk out. You can also use warm compresses. If you are not breast feeding, placing ice packs on your breasts, taking a hot shower, or using warm compresses may relieve the discomfort.    Be aware of postpartum depression    \"Baby blues\" are common for the first 1 to 2 weeks after birth. You may cry or feel sad or irritable for no reason.     For some women, these feelings last longer and are more intense. This is called postpartum depression.     If your symptoms last for more than a few weeks or you feel very depressed, ask your midwife or physician for help.     Postpartum depression can be treated. Support groups and counseling can help, but sometimes medication is needed.     Discuss follow-up appointments with your midwife or " physician, as well. He or she will usually want to see you 6 weeks after the birth of your baby, sooner if you are having problems.    If you have questions about any symptoms you are experiencing or any other concerns, call your provider or their clinic staff at [facility]/OB at Phone: [phone]. If it's after clinic hours, physician patients should call the [facility] at [phone] ([phone]); midwife patients should call their answering service at [phone].    How can you care for yourself at home?   You can refer to the Starting Out Right book or find it online at [email or web address]/images/stories/[email or web address]://[email or web address]/images/stories/maternity/[facility]-[email or web address]-Right.pdf or http://www.[facility].org/images/stories/flipbooks/[facility]-starting-out-right/[facility]-starting-out-right.html#p=8     You can sign up for a weekly parenting e-mail that gives support, tips and advice from health care professionals that starts with pregnancy and continues through the toddler years. To register, go to [email or web address] at any time during your pregnancy.        Making Plans for Feeding My Baby    By this point, you probably have read a lot about feeding your baby.  Breastfeed or formula? Each mother s decision is her own and [facility] respects you and your choices. We ve gathered information on both breastfeeding and formula feeding to help with your decision. Talking with your physician or nurse-midwife can also help in your decision.  However you plan to feed your baby, [facility] encourage rooming in with your baby, skin-to-skin contact and feeding your baby based on his or her cues.    Skin-to-skin contact  Being close to mom helps your baby adjust to life outside of the womb.  It helps your baby regulate their body temperature, heart rate, and breathing.  Your baby will usually be placed skin-to-skin immediately following birth or  as soon as possible, if medical intervention is needed.    Rooming-In  Having your baby stay with you in your room is called  rooming-in .  Keeping your baby in your room helps you to learn how to care for your baby by getting to know your baby s cues, body rhythms and sleep cycle.       Cue-based feeding  Cues (signals) are baby s way of telling you what he or she wants.  When you learn your infant s cues, you know how to care for and feed your baby.   Feeding cues are the licking and smacking of lips, bringing their fist to their mouth, and a reflex called  rooting - where baby turns and opens his or her mouth, searching for the breast or bottle.  Crying is a late feeding cue.  Babies can feed frequently, often at least 8 times in 24 hours.  Breastfeeding facts  Breast milk is the best source of nutrition for your baby and is available at birth. In the first couple of days, your milk volume is already starting to increase, though it may not be noticeable. Breastfeed frequently to increase your milk supply. Within three to five days, you will begin to notice larger milk volumes. An increase in breast size, heaviness and firmness are often described as the milk  coming in.  Frequent breastfeeding can help breasts from getting overly firm and painful. You will know the baby is getting enough milk if your baby is having wet and dirty diapers and gaining weight.     If your goal is to exclusively breastfeed, it is important to not use any formula or artificial nipples (including bottles and pacifiers) while your baby is learning to breastfeed.  While it may seem like an  easy  option to give your baby a bottle, formula should only be given if there is a medical reason for your baby to have it.    Positioning and attachment   Get comfortable.  Use pillows as needed to support your arms and baby.  Hold baby close at the level of your breast, facing you in a tummy to tummy position.  Skin to skin helps with this.   Position the baby with his or her nose by the nipple.  There should be a straight line from baby s ear to shoulder to hips.  Tickle your baby s lips or wait for baby to open mouth wide, bring baby to breast by leading with the chin.  Aim the nipple at the roof of baby s mouth.  A rapid sucking pattern is followed by longer, drawing pattern with occasional swallows heard.  When baby is correctly latched, your nipple and much of the areola are pulled well into baby s mouth.      Returning to work or school  Focus on a good start to breastfeeding.  Many women continue to provide breastmilk for their baby when they return to work or school.  Making plans about where to pump and store milk can make the transition go well.  Talk with other mothers who have also returned to work or school for tips and support.  Your employer s Human Resource department may be a resource as well.     Returning to work or school: (continued)     babies can mean fewer  sick  days for you.    A quality breast pump will also save time and add comfort.  Check with your insurance prior to giving birth for breast pump coverage.  Many insurance companies include a pump within your benefits.    Wait until your baby is at least three weeks old to introduce a bottle for the first time.  Have someone besides you give the bottle.    Breastfeed when you are with your baby. Reserve your bottles of breast milk for when you are away.     Your breasts will need to be  emptied  either by your baby or a pump.  Plan to pump at least twice in an eight hour day.    If you cannot pump at work, continue breastfeeding at home. Any amount of breast milk is worth giving to your baby.    Formula feeding facts  If you are planning to use formula to feed your baby, you will want to make some preparations ahead of time. Talk to your doctor or nurse-midwife about what type of formula to use. Some are iron-fortified, meaning they have extra iron in them. You will  want to purchase formula and bottles before your baby is born to be sure you are ready after you return from the hospital. [facility] do not provide formula samples to take home.    Be sure to follow formula mixing directions closely. Regular milk in the dairy case at the grocery store should not be given to babies under 1 year old. Baby formula is sold in several forms including:    Ready-to-use. This is the most expensive, but no mixing is necessary.    Concentrated liquid. This is less expensive than ready-to-use and you mix with water.    Powder. This is the least expensive. You mix one level scoop of powdered formula with two ounces of water and stir well.    Most babies need 2.5 ounces of formula per pound of body weight each day. This means an 8-pound baby may drink about 20 ounces of formula a day; however, this is just an estimate. The most important thing is to pay attention to your baby s cues.  If your baby is always fussy, needs more iron or has certain food allergies, your physician may suggest you change your baby s formula to a different kind.   How do I warm my baby s bottles?  You may feed your baby a bottle without warming it first. It is OK for the breast milk or formula to be cool or room temperature. If your baby seems to prefer it warmed, you can put the filled bottle in a container of warm water and let it stand for a few minutes. Check the temperature of the liquid on your skin before feeding it to your baby; to be sure it isn t too hot. Do not heat bottles in the microwave. Microwaves heat food and liquids unevenly, and this can cause hot spots that can burn your baby.    How do I clean and sterilize bottles?  Sterilize bottles and nipples before you use them for the first time. You can do this by putting them in boiling water for 5 minutes. After that first time, you can wash them in hot and soapy water. Rinse them carefully to be sure there is no soap left on them. You can  also wash them in the .    [facility] Connection [phone] ([phone])

## 2021-06-24 NOTE — PATIENT INSTRUCTIONS - HE
Patient Education   HEALTHY PREGNANCY CARE: 37 to 41 WEEKS PREGNANT    Talk with your midwife or physician about when to call with signs of labor    Regular uterine contractions that are getting closer together and/or stronger    If you think your water has broken or is leaking    Bleeding from the vagina like a period (bloody vaginal discharge is normal)    If you are not feeling your baby move    Make plans for transportation and  as needed for when you are going to the hospital.    Your midwife or physician may offer to check your cervix for changes.     Ask your health care provider about vaccinations you may need following delivery. By now, you should have received a Tdap immunization to protect against pertussis or whooping cough. Fathers and family members who will be in close contact with the baby should also receive a Tdap shot at least two weeks before the expected birth of the baby if they have not had a Td (tetanus) shot for at least two years.    If you are past your due date, discuss the next steps leading to delivery with your midwife or physician. If you don't start labor on your own by 41 or 42 weeks, your midwife or physician may recommend giving you medicines to ripen your cervix and start labor.    Preparing for your baby: Tell your midwife or physician how you plan to feed your baby (breast or bottle), who you have chosen to do pediatric care for your baby, and if you have a boy, whether you have chosen to have him circumcised. You will need a car seat correctly installed in your vehicle to bring your baby home. As you start to set up the nursery at home for your baby, make sure the crib is safe. The mattress needs to fit snugly against the edges of the crib. If you can fit a soda can between the bars, they are too far apart and can allow the baby's head to caught between them.    Learn about infant care and feeding, including information about infant CPR. We recommend that you put  your baby to sleep on his or her back to reduce the chance of Sudden Infant Death Syndrome (SIDS). To maintain a healthy environment in which your child can grow, it's best to keep your home smoke-free. By preparing ahead, your transition into parenthood will go smoothly for you and your baby.    Your midwife or physician will want to see you for a checkup 2 to 6 weeks after delivery.    If you have questions about any symptoms you are experiencing or any other concerns, call your provider or their clinic staff at [facility]/OB at Phone: [phone]. If it's after clinic hours, physician patients should call the [facility] at [phone] ([phone]); midwife patients should call their answering service at [phone].    How can you care for yourself at home?   You can refer to the Starting Out Right book or find it online at [email or web address] or [email or web address]     You can sign up for a weekly parenting e-mail that gives support, tips and advice from health care professionals that starts with pregnancy and continues through the toddler years. To register, go to [email or web address] at any time during your pregnancy.        Making Plans for Feeding My Baby    By this point, you probably have read a lot about feeding your baby.  Breastfeed or formula? Each mother s decision is her own and [facility] respects you and your choices. We ve gathered information on both breastfeeding and formula feeding to help with your decision. Talking with your physician or nurse-midwife can also help in your decision.  However you plan to feed your baby, [facility] encourage rooming in with your baby, skin-to-skin contact and feeding your baby based on his or her cues.    Skin-to-skin contact  Being close to  mom helps your baby adjust to life outside of the womb.  It helps your baby regulate their body temperature, heart rate, and breathing.  Your baby will usually be placed skin-to-skin immediately following birth or as soon as possible, if medical intervention is needed.    Rooming-In  Having your baby stay with you in your room is called  rooming-in .  Keeping your baby in your room helps you to learn how to care for your baby by getting to know your baby s cues, body rhythms and sleep cycle.       Cue-based feeding  Cues (signals) are baby s way of telling you what he or she wants.  When you learn your infant s cues, you know how to care for and feed your baby.   Feeding cues are the licking and smacking of lips, bringing their fist to their mouth, and a reflex called  rooting - where baby turns and opens his or her mouth, searching for the breast or bottle.  Crying is a late feeding cue.  Babies can feed frequently, often at least 8 times in 24 hours.  Breastfeeding facts  Breast milk is the best source of nutrition for your baby and is available at birth. In the first couple of days, your milk volume is already starting to increase, though it may not be noticeable. Breastfeed frequently to increase your milk supply. Within three to five days, you will begin to notice larger milk volumes. An increase in breast size, heaviness and firmness are often described as the milk  coming in.  Frequent breastfeeding can help breasts from getting overly firm and painful. You will know the baby is getting enough milk if your baby is having wet and dirty diapers and gaining weight.     If your goal is to exclusively breastfeed, it is important to not use any formula or artificial nipples (including bottles and pacifiers) while your baby is learning to breastfeed.  While it may seem like an  easy  option to give your baby a bottle, formula should only be given if there is a medical reason for your baby to have it.    Positioning  and attachment   Get comfortable.  Use pillows as needed to support your arms and baby.  Hold baby close at the level of your breast, facing you in a tummy to tummy position.  Skin to skin helps with this.  Position the baby with his or her nose by the nipple.  There should be a straight line from baby s ear to shoulder to hips.  Tickle your baby s lips or wait for baby to open mouth wide, bring baby to breast by leading with the chin.  Aim the nipple at the roof of baby s mouth.  A rapid sucking pattern is followed by longer, drawing pattern with occasional swallows heard.  When baby is correctly latched, your nipple and much of the areola are pulled well into baby s mouth.      Returning to work or school  Focus on a good start to breastfeeding.  Many women continue to provide breastmilk for their baby when they return to work or school.  Making plans about where to pump and store milk can make the transition go well.  Talk with other mothers who have also returned to work or school for tips and support.  Your employer s Human Resource department may be a resource as well.     Returning to work or school: (continued)     babies can mean fewer  sick  days for you.    A quality breast pump will also save time and add comfort.  Check with your insurance prior to giving birth for breast pump coverage.  Many insurance companies include a pump within your benefits.    Wait until your baby is at least three weeks old to introduce a bottle for the first time.  Have someone besides you give the bottle.    Breastfeed when you are with your baby. Reserve your bottles of breast milk for when you are away.     Your breasts will need to be  emptied  either by your baby or a pump.  Plan to pump at least twice in an eight hour day.    If you cannot pump at work, continue breastfeeding at home. Any amount of breast milk is worth giving to your baby.    Formula feeding facts  If you are planning to use formula to feed your  baby, you will want to make some preparations ahead of time. Talk to your doctor or nurse-midwife about what type of formula to use. Some are iron-fortified, meaning they have extra iron in them. You will want to purchase formula and bottles before your baby is born to be sure you are ready after you return from the hospital. [facility] do not provide formula samples to take home.    Be sure to follow formula mixing directions closely. Regular milk in the dairy case at the grocery store should not be given to babies under 1 year old. Baby formula is sold in several forms including:    Ready-to-use. This is the most expensive, but no mixing is necessary.    Concentrated liquid. This is less expensive than ready-to-use and you mix with water.    Powder. This is the least expensive. You mix one level scoop of powdered formula with two ounces of water and stir well.    Most babies need 2.5 ounces of formula per pound of body weight each day. This means an 8-pound baby may drink about 20 ounces of formula a day; however, this is just an estimate. The most important thing is to pay attention to your baby s cues.  If your baby is always fussy, needs more iron or has certain food allergies, your physician may suggest you change your baby s formula to a different kind.   How do I warm my baby s bottles?  You may feed your baby a bottle without warming it first. It is OK for the breast milk or formula to be cool or room temperature. If your baby seems to prefer it warmed, you can put the filled bottle in a container of warm water and let it stand for a few minutes. Check the temperature of the liquid on your skin before feeding it to your baby; to be sure it isn t too hot. Do not heat bottles in the microwave. Microwaves heat food and liquids unevenly, and this can cause hot spots that can burn your baby.    How do I clean and sterilize bottles?  Sterilize bottles and nipples before you use them for the first  time. You can do this by putting them in boiling water for 5 minutes. After that first time, you can wash them in hot and soapy water. Rinse them carefully to be sure there is no soap left on them. You can also wash them in the .    [facility] [phone] ([phone])

## 2021-06-24 NOTE — PROGRESS NOTES
[facility] Discharge Summary  Patient Name: Aspen Penn  Date: 3/4/2019  Referral Diagnosis: SI (sacroiliac) joint dysfunction  Referring provider: Nanda Rodriguez MD  Visit Diagnosis:   1. Sacral pain     2. Pain in thoracic spine         Goals:  Pt. will demonstrate/verbalize independence in self-management of condition in : 6 weeks;Partially Met  Pt. will be independent with home exercise program in : 4 weeks    Patient will experience no leakage with coughing, sneezing, jumping : in 8-10 weeks;Met  Patient will increase pelvic floor strength : to decrease episodes of urinary incontinence to _/day;in 8-10 weeks;Comment;Met  decrease episodes of urinary incontinence to no more than _ / day: 0-1x  Pt will: note 50% reduction in SI symptoms at the end of her work day in 6 weeks  Pt will: redce STACIE by 20% in 8weeks      Patient was seen for 1 visits physical therapy.    The patient attended therapy initially, but did not finish the therapy sessions prescribed.  Goals were not fully achieved. Explanation for goals not achieved: The patient discontinued therapy, did not return.    Therapy will be discontinued at this time.  Please see progress note dated 1/16/19 for patient status.      Thank you for your referral.  Av Zepeda, PT  3/4/2019  9:30 AM

## 2021-06-24 NOTE — PATIENT INSTRUCTIONS - HE
Patient Education   HEALTHY PREGNANCY CARE: 34-36 WEEKS PREGNANT    Your baby is gaining about an ounce each day, so healthy nutrition and rest are still very important. Learn about late pregnancy symptoms, such as constipation and backaches. Drinking more fluids and eating more fiber can relieve constipation. The pelvic tilt and a heating pad can ease backaches.    Continue to watch for signs and symptoms of preeclampsia:     Sudden swelling of your face, hands, or feet     New vision problems such as blurring, double vision, or flashing lights    A severe headache not relieved with acetaminophen (Tylenol)    Sharp or stabbing pain in your right or middle upper abdomen    You're almost done with your pregnancy but it's still too soon to have your baby. The goal is to have your baby after 37 weeks, so watch for signs and symptoms of premature labor:     Regular contractions. This means having about 6 or more within 1 hour, even after you have had a glass of water and are resting.     A backache that starts and stops regularly.    An increase or change in vaginal discharge, such as heavy, mucus-like, watery, or bloody discharge.     Your water breaks or leaks.    You will be tested for group B streptococcus (GBS), a type of bacteria found in 10-30% of pregnant women. A woman with GBS can pass it to her baby during delivery. Most babies who get GBS from their mothers do not have any problems, but some babies get very ill and need to be hospitalized for antibiotic treatment. Treating you with antibiotics during labor and delivery helps to prevent infection in your baby.    Review information on postpartum care that you will need after the baby is born.  Discuss your choices and plans for birth control with your midwife or physician.     Postpartum Care  During the days and weeks after the delivery of your baby (postpartum period), your body will change as it returns to its nonpregnant condition. As with pregnancy  "changes, postpartum changes are different for every woman.    Physical changes after childbirth  The changes in your body may include:    Contractions called afterpains shrink the uterus for several days after childbirth. Shrinking of the uterus to its prepregnancy size may take 6 to 8 weeks.    Sore muscles (especially in the arms, neck, or jaw) are common after childbirth. This is because of the hard work of labor and pushing your baby out. The soreness should go away in a few days.    Bleeding and vaginal discharge (lochia) may last for 2 to 8 weeks, and can come and go for about 2 months.    Vaginal soreness, including pain, discomfort, and numbness, is common after vaginal birth. Soreness may be worse if you had a perineal tear or episiotomy.    If you had a  birth (), you may have pain in your lower abdomen and may need pain medicine for 1 to 2 weeks.    Breast engorgement is common around the 3rd or 4th day after delivery, when the breasts begin to fill with milk. This can cause discomfort and swelling. If you are breast feeding, the best treatment is to feed your baby often or pump the milk out. You can also use warm compresses. If you are not breast feeding, placing ice packs on your breasts, taking a hot shower, or using warm compresses may relieve the discomfort.    Be aware of postpartum depression    \"Baby blues\" are common for the first 1 to 2 weeks after birth. You may cry or feel sad or irritable for no reason.     For some women, these feelings last longer and are more intense. This is called postpartum depression.     If your symptoms last for more than a few weeks or you feel very depressed, ask your midwife or physician for help.     Postpartum depression can be treated. Support groups and counseling can help, but sometimes medication is needed.     Discuss follow-up appointments with your midwife or physician, as well. He or she will usually want to see you 6 weeks after the " birth of your baby, sooner if you are having problems.    If you have questions about any symptoms you are experiencing or any other concerns, call your provider or their clinic staff at [facility] at Phone: [phone]. If it's after clinic hours, physician patients should call the [facility] Connection at [phone] ([phone]); midwife patients should call their answering service at [phone].    How can you care for yourself at home?   You can refer to the Starting Out Right book or find it online at [email or web address]/[email or web address]-Right.[email or web address] or [email or web address]/images/[email or web address]/flipbooks/[facility]-starting-out-right/[facility]-starting-out-right.html#p=8     You can sign up for a weekly parenting e-mail that gives support, tips and advice from health care professionals that starts with pregnancy and continues through the toddler years. To register, go to [email or web address] at any time during your pregnancy.        Making Plans for Feeding My Baby    By this point, you probably have read a lot about feeding your baby.  Breastfeed or formula? Each mother s decision is her own and [facility] respects you and your choices. We ve gathered information on both breastfeeding and formula feeding to help with your decision. Talking with your physician or nurse-midwife can also help in your decision.  However you plan to feed your baby, [facility] encourage rooming in with your baby, skin-to-skin contact and feeding your baby based on his or her cues.    Skin-to-skin contact  Being close to mom helps your baby adjust to life outside of the womb.  It helps your baby regulate their body temperature, heart rate, and breathing.  Your baby will usually be placed skin-to-skin immediately following birth or as soon as possible, if medical intervention is needed.    Rooming-In  Having  your baby stay with you in your room is called  rooming-in .  Keeping your baby in your room helps you to learn how to care for your baby by getting to know your baby s cues, body rhythms and sleep cycle.       Cue-based feeding  Cues (signals) are baby s way of telling you what he or she wants.  When you learn your infant s cues, you know how to care for and feed your baby.   Feeding cues are the licking and smacking of lips, bringing their fist to their mouth, and a reflex called  rooting - where baby turns and opens his or her mouth, searching for the breast or bottle.  Crying is a late feeding cue.  Babies can feed frequently, often at least 8 times in 24 hours.  Breastfeeding facts  Breast milk is the best source of nutrition for your baby and is available at birth. In the first couple of days, your milk volume is already starting to increase, though it may not be noticeable. Breastfeed frequently to increase your milk supply. Within three to five days, you will begin to notice larger milk volumes. An increase in breast size, heaviness and firmness are often described as the milk  coming in.  Frequent breastfeeding can help breasts from getting overly firm and painful. You will know the baby is getting enough milk if your baby is having wet and dirty diapers and gaining weight.     If your goal is to exclusively breastfeed, it is important to not use any formula or artificial nipples (including bottles and pacifiers) while your baby is learning to breastfeed.  While it may seem like an  easy  option to give your baby a bottle, formula should only be given if there is a medical reason for your baby to have it.    Positioning and attachment   Get comfortable.  Use pillows as needed to support your arms and baby.  Hold baby close at the level of your breast, facing you in a tummy to tummy position.  Skin to skin helps with this.  Position the baby with his or her nose by the nipple.  There should be a straight  line from baby s ear to shoulder to hips.  Tickle your baby s lips or wait for baby to open mouth wide, bring baby to breast by leading with the chin.  Aim the nipple at the roof of baby s mouth.  A rapid sucking pattern is followed by longer, drawing pattern with occasional swallows heard.  When baby is correctly latched, your nipple and much of the areola are pulled well into baby s mouth.      Returning to work or school  Focus on a good start to breastfeeding.  Many women continue to provide breastmilk for their baby when they return to work or school.  Making plans about where to pump and store milk can make the transition go well.  Talk with other mothers who have also returned to work or school for tips and support.  Your employer s Human Resource department may be a resource as well.     Returning to work or school: (continued)     babies can mean fewer  sick  days for you.    A quality breast pump will also save time and add comfort.  Check with your insurance prior to giving birth for breast pump coverage.  Many insurance companies include a pump within your benefits.    Wait until your baby is at least three weeks old to introduce a bottle for the first time.  Have someone besides you give the bottle.    Breastfeed when you are with your baby. Reserve your bottles of breast milk for when you are away.     Your breasts will need to be  emptied  either by your baby or a pump.  Plan to pump at least twice in an eight hour day.    If you cannot pump at work, continue breastfeeding at home. Any amount of breast milk is worth giving to your baby.    Formula feeding facts  If you are planning to use formula to feed your baby, you will want to make some preparations ahead of time. Talk to your doctor or nurse-midwife about what type of formula to use. Some are iron-fortified, meaning they have extra iron in them. You will want to purchase formula and bottles before your baby is born to be sure you are  ready after you return from the hospital. [facility] do not provide formula samples to take home.    Be sure to follow formula mixing directions closely. Regular milk in the dairy case at the grocery store should not be given to babies under 1 year old. Baby formula is sold in several forms including:    Ready-to-use. This is the most expensive, but no mixing is necessary.    Concentrated liquid. This is less expensive than ready-to-use and you mix with water.    Powder. This is the least expensive. You mix one level scoop of powdered formula with two ounces of water and stir well.    Most babies need 2.5 ounces of formula per pound of body weight each day. This means an 8-pound baby may drink about 20 ounces of formula a day; however, this is just an estimate. The most important thing is to pay attention to your baby s cues.  If your baby is always fussy, needs more iron or has certain food allergies, your physician may suggest you change your baby s formula to a different kind.   How do I warm my baby s bottles?  You may feed your baby a bottle without warming it first. It is OK for the breast milk or formula to be cool or room temperature. If your baby seems to prefer it warmed, you can put the filled bottle in a container of warm water and let it stand for a few minutes. Check the temperature of the liquid on your skin before feeding it to your baby; to be sure it isn t too hot. Do not heat bottles in the microwave. Microwaves heat food and liquids unevenly, and this can cause hot spots that can burn your baby.    How do I clean and sterilize bottles?  Sterilize bottles and nipples before you use them for the first time. You can do this by putting them in boiling water for 5 minutes. After that first time, you can wash them in hot and soapy water. Rinse them carefully to be sure there is no soap left on them. You can also wash them in the .    [facility] Connection [phone]  ([phone])     Patient Education   HEALTHY PREGNANCY CARE: 37 to 41 WEEKS PREGNANT    Talk with your midwife or physician about when to call with signs of labor    Regular uterine contractions that are getting closer together and/or stronger    If you think your water has broken or is leaking    Bleeding from the vagina like a period (bloody vaginal discharge is normal)    If you are not feeling your baby move    Make plans for transportation and  as needed for when you are going to the hospital.    Your midwife or physician may offer to check your cervix for changes.     Ask your health care provider about vaccinations you may need following delivery. By now, you should have received a Tdap immunization to protect against pertussis or whooping cough. Fathers and family members who will be in close contact with the baby should also receive a Tdap shot at least two weeks before the expected birth of the baby if they have not had a Td (tetanus) shot for at least two years.    If you are past your due date, discuss the next steps leading to delivery with your midwife or physician. If you don't start labor on your own by 41 or 42 weeks, your midwife or physician may recommend giving you medicines to ripen your cervix and start labor.    Preparing for your baby: Tell your midwife or physician how you plan to feed your baby (breast or bottle), who you have chosen to do pediatric care for your baby, and if you have a boy, whether you have chosen to have him circumcised. You will need a car seat correctly installed in your vehicle to bring your baby home. As you start to set up the nursery at home for your baby, make sure the crib is safe. The mattress needs to fit snugly against the edges of the crib. If you can fit a soda can between the bars, they are too far apart and can allow the baby's head to caught between them.    Learn about infant care and feeding, including information about infant CPR. We recommend that  you put your baby to sleep on his or her back to reduce the chance of Sudden Infant Death Syndrome (SIDS). To maintain a healthy environment in which your child can grow, it's best to keep your home smoke-free. By preparing ahead, your transition into parenthood will go smoothly for you and your baby.    Your midwife or physician will want to see you for a checkup 2 to 6 weeks after delivery.    If you have questions about any symptoms you are experiencing or any other concerns, call your provider or their clinic staff at [facility] at Phone: [phone]. If it's after clinic hours, physician patients should call the Care Connection at [phone] ([phone]); midwife patients should call their answering service at [phone].    How can you care for yourself at home?   You can refer to the Starting Out Right book or find it online at [email or web address]/images/stories/maternity/[facility]Starting[facility]Out[facility]Right.pdf or [email or web address].[email or web address]/images/stories/flipbooks/[facility]-starting-out-right/[facility]-starting-out-right.html#p=8     You can sign up for a weekly parenting e-mail that gives support, tips and advice from health care professionals that starts with pregnancy and continues through the toddler years. To register, go to [email or web address] at any time during your pregnancy.        Making Plans for Feeding My Baby    By this point, you probably have read a lot about feeding your baby.  Breastfeed or formula? Each mother s decision is her own and [facility] respects you and your choices. We ve gathered information on both breastfeeding and formula feeding to help with your decision. Talking with your physician or nurse-midwife can also help in your decision.  However you plan to feed your baby, [facility] encourage rooming in with your baby, skin-to-skin contact and feeding your baby based on his or her cues.    Skin-to-skin contact  Being  close to mom helps your baby adjust to life outside of the womb.  It helps your baby regulate their body temperature, heart rate, and breathing.  Your baby will usually be placed skin-to-skin immediately following birth or as soon as possible, if medical intervention is needed.    Rooming-In  Having your baby stay with you in your room is called  rooming-in .  Keeping your baby in your room helps you to learn how to care for your baby by getting to know your baby s cues, body rhythms and sleep cycle.       Cue-based feeding  Cues (signals) are baby s way of telling you what he or she wants.  When you learn your infant s cues, you know how to care for and feed your baby.   Feeding cues are the licking and smacking of lips, bringing their fist to their mouth, and a reflex called  rooting - where baby turns and opens his or her mouth, searching for the breast or bottle.  Crying is a late feeding cue.  Babies can feed frequently, often at least 8 times in 24 hours.  Breastfeeding facts  Breast milk is the best source of nutrition for your baby and is available at birth. In the first couple of days, your milk volume is already starting to increase, though it may not be noticeable. Breastfeed frequently to increase your milk supply. Within three to five days, you will begin to notice larger milk volumes. An increase in breast size, heaviness and firmness are often described as the milk  coming in.  Frequent breastfeeding can help breasts from getting overly firm and painful. You will know the baby is getting enough milk if your baby is having wet and dirty diapers and gaining weight.     If your goal is to exclusively breastfeed, it is important to not use any formula or artificial nipples (including bottles and pacifiers) while your baby is learning to breastfeed.  While it may seem like an  easy  option to give your baby a bottle, formula should only be given if there is a medical reason for your baby to have it.     Positioning and attachment   Get comfortable.  Use pillows as needed to support your arms and baby.  Hold baby close at the level of your breast, facing you in a tummy to tummy position.  Skin to skin helps with this.  Position the baby with his or her nose by the nipple.  There should be a straight line from baby s ear to shoulder to hips.  Tickle your baby s lips or wait for baby to open mouth wide, bring baby to breast by leading with the chin.  Aim the nipple at the roof of baby s mouth.  A rapid sucking pattern is followed by longer, drawing pattern with occasional swallows heard.  When baby is correctly latched, your nipple and much of the areola are pulled well into baby s mouth.      Returning to work or school  Focus on a good start to breastfeeding.  Many women continue to provide breastmilk for their baby when they return to work or school.  Making plans about where to pump and store milk can make the transition go well.  Talk with other mothers who have also returned to work or school for tips and support.  Your employer s Human Resource department may be a resource as well.     Returning to work or school: (continued)     babies can mean fewer  sick  days for you.    A quality breast pump will also save time and add comfort.  Check with your insurance prior to giving birth for breast pump coverage.  Many insurance companies include a pump within your benefits.    Wait until your baby is at least three weeks old to introduce a bottle for the first time.  Have someone besides you give the bottle.    Breastfeed when you are with your baby. Reserve your bottles of breast milk for when you are away.     Your breasts will need to be  emptied  either by your baby or a pump.  Plan to pump at least twice in an eight hour day.    If you cannot pump at work, continue breastfeeding at home. Any amount of breast milk is worth giving to your baby.    Formula feeding facts  If you are planning to use  formula to feed your baby, you will want to make some preparations ahead of time. Talk to your doctor or nurse-midwife about what type of formula to use. Some are iron-fortified, meaning they have extra iron in them. You will want to purchase formula and bottles before your baby is born to be sure you are ready after you return from the hospital. [facility] do not provide formula samples to take home.    Be sure to follow formula mixing directions closely. Regular milk in the dairy case at the grocery store should not be given to babies under 1 year old. Baby formula is sold in several forms including:    Ready-to-use. This is the most expensive, but no mixing is necessary.    Concentrated liquid. This is less expensive than ready-to-use and you mix with water.    Powder. This is the least expensive. You mix one level scoop of powdered formula with two ounces of water and stir well.    Most babies need 2.5 ounces of formula per pound of body weight each day. This means an 8-pound baby may drink about 20 ounces of formula a day; however, this is just an estimate. The most important thing is to pay attention to your baby s cues.  If your baby is always fussy, needs more iron or has certain food allergies, your physician may suggest you change your baby s formula to a different kind.   How do I warm my baby s bottles?  You may feed your baby a bottle without warming it first. It is OK for the breast milk or formula to be cool or room temperature. If your baby seems to prefer it warmed, you can put the filled bottle in a container of warm water and let it stand for a few minutes. Check the temperature of the liquid on your skin before feeding it to your baby; to be sure it isn t too hot. Do not heat bottles in the microwave. Microwaves heat food and liquids unevenly, and this can cause hot spots that can burn your baby.    How do I clean and sterilize bottles?  Sterilize bottles and nipples before you use  them for the first time. You can do this by putting them in boiling water for 5 minutes. After that first time, you can wash them in hot and soapy water. Rinse them carefully to be sure there is no soap left on them. You can also wash them in the .    [facility] Connection [phone] ([phone])

## 2021-06-24 NOTE — ANESTHESIA POSTPROCEDURE EVALUATION
Patient: Aspen Penn  * No procedures listed *  Anesthesia type: epidural    Patient location: Labor and Delivery  Last vitals:   Vitals:    03/08/19 0430   BP: 111/55   Pulse: 76   Resp: 18   Temp:    SpO2:      Post vital signs: stable  Level of consciousness: awake and responds to simple questions  Post-anesthesia pain: pain controlled  Post-anesthesia nausea and vomiting: no  Pulmonary: unassisted, return to baseline  Cardiovascular: stable and blood pressure at baseline  Hydration: adequate  Anesthetic events: no    QCDR Measures:  ASA# 11 - Mai-op Cardiac Arrest: ASA11B - Patient did NOT experience unanticipated cardiac arrest  ASA# 12 - Mai-op Mortality Rate: ASA12B - Patient did NOT die  ASA# 13 - PACU Re-Intubation Rate: NA - No ETT / LMA used for case  ASA# 10 - Composite Anes Safety: ASA10A - No serious adverse event    Additional Notes:

## 2021-06-24 NOTE — PATIENT INSTRUCTIONS - HE
Patient Education   HEALTHY PREGNANCY CARE: 37 to 41 WEEKS PREGNANT    Talk with your midwife or physician about when to call with signs of labor    Regular uterine contractions that are getting closer together and/or stronger    If you think your water has broken or is leaking    Bleeding from the vagina like a period (bloody vaginal discharge is normal)    If you are not feeling your baby move    Make plans for transportation and  as needed for when you are going to the hospital.    Your midwife or physician may offer to check your cervix for changes.     Ask your health care provider about vaccinations you may need following delivery. By now, you should have received a Tdap immunization to protect against pertussis or whooping cough. Fathers and family members who will be in close contact with the baby should also receive a Tdap shot at least two weeks before the expected birth of the baby if they have not had a Td (tetanus) shot for at least two years.    If you are past your due date, discuss the next steps leading to delivery with your midwife or physician. If you don't start labor on your own by 41 or 42 weeks, your midwife or physician may recommend giving you medicines to ripen your cervix and start labor.    Preparing for your baby: Tell your midwife or physician how you plan to feed your baby (breast or bottle), who you have chosen to do pediatric care for your baby, and if you have a boy, whether you have chosen to have him circumcised. You will need a car seat correctly installed in your vehicle to bring your baby home. As you start to set up the nursery at home for your baby, make sure the crib is safe. The mattress needs to fit snugly against the edges of the crib. If you can fit a soda can between the bars, they are too far apart and can allow the baby's head to caught between them.    Learn about infant care and feeding, including information about infant CPR. We recommend that you put  your baby to sleep on his or her back to reduce the chance of Sudden Infant Death Syndrome (SIDS). To maintain a healthy environment in which your child can grow, it's best to keep your home smoke-free. By preparing ahead, your transition into parenthood will go smoothly for you and your baby.    Your midwife or physician will want to see you for a checkup 2 to 6 weeks after delivery.    If you have questions about any symptoms you are experiencing or any other concerns, call your provider or their clinic staff at [facility] at Phone: [phone]. If it's after clinic hours, physician patients should call the Care Connection at [phone]); midwife patients should call their answering service at [phone].    How can you care for yourself at home?   You can refer to the Starting Out Right book or find it online at [email or web address] or [email or web address]#p=8     You can sign up for a weekly parenting e-mail that gives support, tips and advice from health care professionals that starts with pregnancy and continues through the toddler years. To register, go to [email or web address] at any time during your pregnancy.        Making Plans for Feeding My Baby    By this point, you probably have read a lot about feeding your baby.  Breastfeed or formula? Each mother s decision is her own and [facility] respects you and your choices. We ve gathered information on both breastfeeding and formula feeding to help with your decision. Talking with your physician or nurse-midwife can also help in your decision.  However you plan to feed your baby, [facility] encourage rooming in with your baby, skin-to-skin contact and feeding your baby based on his or her cues.    Skin-to-skin contact  Being close to  mom helps your baby adjust to life outside of the womb.  It helps your baby regulate their body temperature, heart rate, and breathing.  Your baby will usually be placed skin-to-skin immediately following birth or as soon as possible, if medical intervention is needed.    Rooming-In  Having your baby stay with you in your room is called  rooming-in .  Keeping your baby in your room helps you to learn how to care for your baby by getting to know your baby s cues, body rhythms and sleep cycle.       Cue-based feeding  Cues (signals) are baby s way of telling you what he or she wants.  When you learn your infant s cues, you know how to care for and feed your baby.   Feeding cues are the licking and smacking of lips, bringing their fist to their mouth, and a reflex called  rooting - where baby turns and opens his or her mouth, searching for the breast or bottle.  Crying is a late feeding cue.  Babies can feed frequently, often at least 8 times in 24 hours.  Breastfeeding facts  Breast milk is the best source of nutrition for your baby and is available at birth. In the first couple of days, your milk volume is already starting to increase, though it may not be noticeable. Breastfeed frequently to increase your milk supply. Within three to five days, you will begin to notice larger milk volumes. An increase in breast size, heaviness and firmness are often described as the milk  coming in.  Frequent breastfeeding can help breasts from getting overly firm and painful. You will know the baby is getting enough milk if your baby is having wet and dirty diapers and gaining weight.     If your goal is to exclusively breastfeed, it is important to not use any formula or artificial nipples (including bottles and pacifiers) while your baby is learning to breastfeed.  While it may seem like an  easy  option to give your baby a bottle, formula should only be given if there is a medical reason for your baby to have it.    Positioning  and attachment   Get comfortable.  Use pillows as needed to support your arms and baby.  Hold baby close at the level of your breast, facing you in a tummy to tummy position.  Skin to skin helps with this.  Position the baby with his or her nose by the nipple.  There should be a straight line from baby s ear to shoulder to hips.  Tickle your baby s lips or wait for baby to open mouth wide, bring baby to breast by leading with the chin.  Aim the nipple at the roof of baby s mouth.  A rapid sucking pattern is followed by longer, drawing pattern with occasional swallows heard.  When baby is correctly latched, your nipple and much of the areola are pulled well into baby s mouth.      Returning to work or school  Focus on a good start to breastfeeding.  Many women continue to provide breastmilk for their baby when they return to work or school.  Making plans about where to pump and store milk can make the transition go well.  Talk with other mothers who have also returned to work or school for tips and support.  Your employer s Human Resource department may be a resource as well.     Returning to work or school: (continued)     babies can mean fewer  sick  days for you.    A quality breast pump will also save time and add comfort.  Check with your insurance prior to giving birth for breast pump coverage.  Many insurance companies include a pump within your benefits.    Wait until your baby is at least three weeks old to introduce a bottle for the first time.  Have someone besides you give the bottle.    Breastfeed when you are with your baby. Reserve your bottles of breast milk for when you are away.     Your breasts will need to be  emptied  either by your baby or a pump.  Plan to pump at least twice in an eight hour day.    If you cannot pump at work, continue breastfeeding at home. Any amount of breast milk is worth giving to your baby.    Formula feeding facts  If you are planning to use formula to feed your  baby, you will want to make some preparations ahead of time. Talk to your doctor or nurse-midwife about what type of formula to use. Some are iron-fortified, meaning they have extra iron in them. You will want to purchase formula and bottles before your baby is born to be sure you are ready after you return from the hospital. The [place] do not provide formula samples to take home.    Be sure to follow formula mixing directions closely. Regular milk in the dairy case at the grocery store should not be given to babies under 1 year old. Baby formula is sold in several forms including:    Ready-to-use. This is the most expensive, but no mixing is necessary.    Concentrated liquid. This is less expensive than ready-to-use and you mix with water.    Powder. This is the least expensive. You mix one level scoop of powdered formula with two ounces of water and stir well.    Most babies need 2.5 ounces of formula per pound of body weight each day. This means an 8-pound baby may drink about 20 ounces of formula a day; however, this is just an estimate. The most important thing is to pay attention to your baby s cues.  If your baby is always fussy, needs more iron or has certain food allergies, your physician may suggest you change your baby s formula to a different kind.   How do I warm my baby s bottles?  You may feed your baby a bottle without warming it first. It is OK for the breast milk or formula to be cool or room temperature. If your baby seems to prefer it warmed, you can put the filled bottle in a container of warm water and let it stand for a few minutes. Check the temperature of the liquid on your skin before feeding it to your baby; to be sure it isn t too hot. Do not heat bottles in the microwave. Microwaves heat food and liquids unevenly, and this can cause hot spots that can burn your baby.    How do I clean and sterilize bottles?  Sterilize bottles and nipples before you use them for the first  time. You can do this by putting them in boiling water for 5 minutes. After that first time, you can wash them in hot and soapy water. Rinse them carefully to be sure there is no soap left on them. You can also wash them in the .    [facility] Connection [phone] ([phone])     Patient Education   HEALTHY PREGNANCY CARE: 37 to 41 WEEKS PREGNANT    Talk with your midwife or physician about when to call with signs of labor    Regular uterine contractions that are getting closer together and/or stronger    If you think your water has broken or is leaking    Bleeding from the vagina like a period (bloody vaginal discharge is normal)    If you are not feeling your baby move    Make plans for transportation and  as needed for when you are going to the hospital.    Your midwife or physician may offer to check your cervix for changes.     Ask your health care provider about vaccinations you may need following delivery. By now, you should have received a Tdap immunization to protect against pertussis or whooping cough. Fathers and family members who will be in close contact with the baby should also receive a Tdap shot at least two weeks before the expected birth of the baby if they have not had a Td (tetanus) shot for at least two years.    If you are past your due date, discuss the next steps leading to delivery with your midwife or physician. If you don't start labor on your own by 41 or 42 weeks, your midwife or physician may recommend giving you medicines to ripen your cervix and start labor.    Preparing for your baby: Tell your midwife or physician how you plan to feed your baby (breast or bottle), who you have chosen to do pediatric care for your baby, and if you have a boy, whether you have chosen to have him circumcised. You will need a car seat correctly installed in your vehicle to bring your baby home. As you start to set up the nursery at home for your baby, make sure the crib is safe.  The mattress needs to fit snugly against the edges of the crib. If you can fit a soda can between the bars, they are too far apart and can allow the baby's head to caught between them.    Learn about infant care and feeding, including information about infant CPR. We recommend that you put your baby to sleep on his or her back to reduce the chance of Sudden Infant Death Syndrome (SIDS). To maintain a healthy environment in which your child can grow, it's best to keep your home smoke-free. By preparing ahead, your transition into parenthood will go smoothly for you and your baby.    Your midwife or physician will want to see you for a checkup 2 to 6 weeks after delivery.    If you have questions about any symptoms you are experiencing or any other concerns, call your provider or their clinic staff at [facility]/OB at Phone: [phone]. If it's after clinic hours, physician patients should call the Care Connection at [phone]); midwife patients should call their answering service at [phone].    How can you care for yourself at home?   You can refer to the Starting Out Right book or find it online at [email or web address]/[email or web address]-[email or web address] or [email or web address]/images/[email or web address]-out-[email or web address]-starting-out-right.html#p=8     You can sign up for a weekly parenting e-mail that gives support, tips and advice from health care professionals that starts with pregnancy and continues through the toddler years. To register, go to [email or web address] at any time during your pregnancy.        Making Plans for Feeding My Baby    By this point, you probably have read a lot about feeding your baby.  Breastfeed or formula? Each mother s decision is her own and [facility] respects you and your choices. We ve gathered information on both breastfeeding and formula feeding to help with your decision.  Talking with your physician or nurse-midwife can also help in your decision.  However you plan to feed your baby, [facility] encourage rooming in with your baby, skin-to-skin contact and feeding your baby based on his or her cues.    Skin-to-skin contact  Being close to mom helps your baby adjust to life outside of the womb.  It helps your baby regulate their body temperature, heart rate, and breathing.  Your baby will usually be placed skin-to-skin immediately following birth or as soon as possible, if medical intervention is needed.    Rooming-In  Having your baby stay with you in your room is called  rooming-in .  Keeping your baby in your room helps you to learn how to care for your baby by getting to know your baby s cues, body rhythms and sleep cycle.       Cue-based feeding  Cues (signals) are baby s way of telling you what he or she wants.  When you learn your infant s cues, you know how to care for and feed your baby.   Feeding cues are the licking and smacking of lips, bringing their fist to their mouth, and a reflex called  rooting - where baby turns and opens his or her mouth, searching for the breast or bottle.  Crying is a late feeding cue.  Babies can feed frequently, often at least 8 times in 24 hours.  Breastfeeding facts  Breast milk is the best source of nutrition for your baby and is available at birth. In the first couple of days, your milk volume is already starting to increase, though it may not be noticeable. Breastfeed frequently to increase your milk supply. Within three to five days, you will begin to notice larger milk volumes. An increase in breast size, heaviness and firmness are often described as the milk  coming in.  Frequent breastfeeding can help breasts from getting overly firm and painful. You will know the baby is getting enough milk if your baby is having wet and dirty diapers and gaining weight.     If your goal is to exclusively breastfeed, it is  important to not use any formula or artificial nipples (including bottles and pacifiers) while your baby is learning to breastfeed.  While it may seem like an  easy  option to give your baby a bottle, formula should only be given if there is a medical reason for your baby to have it.    Positioning and attachment   Get comfortable.  Use pillows as needed to support your arms and baby.  Hold baby close at the level of your breast, facing you in a tummy to tummy position.  Skin to skin helps with this.  Position the baby with his or her nose by the nipple.  There should be a straight line from baby s ear to shoulder to hips.  Tickle your baby s lips or wait for baby to open mouth wide, bring baby to breast by leading with the chin.  Aim the nipple at the roof of baby s mouth.  A rapid sucking pattern is followed by longer, drawing pattern with occasional swallows heard.  When baby is correctly latched, your nipple and much of the areola are pulled well into baby s mouth.      Returning to work or school  Focus on a good start to breastfeeding.  Many women continue to provide breastmilk for their baby when they return to work or school.  Making plans about where to pump and store milk can make the transition go well.  Talk with other mothers who have also returned to work or school for tips and support.  Your employer s Human Resource department may be a resource as well.     Returning to work or school: (continued)     babies can mean fewer  sick  days for you.    A quality breast pump will also save time and add comfort.  Check with your insurance prior to giving birth for breast pump coverage.  Many insurance companies include a pump within your benefits.    Wait until your baby is at least three weeks old to introduce a bottle for the first time.  Have someone besides you give the bottle.    Breastfeed when you are with your baby. Reserve your bottles of breast milk for when you are away.     Your breasts  will need to be  emptied  either by your baby or a pump.  Plan to pump at least twice in an eight hour day.    If you cannot pump at work, continue breastfeeding at home. Any amount of breast milk is worth giving to your baby.    Formula feeding facts  If you are planning to use formula to feed your baby, you will want to make some preparations ahead of time. Talk to your doctor or nurse-midwife about what type of formula to use. Some are iron-fortified, meaning they have extra iron in them. You will want to purchase formula and bottles before your baby is born to be sure you are ready after you return from the hospital. The [facility] do not provide formula samples to take home.    Be sure to follow formula mixing directions closely. Regular milk in the dairy case at the grocery store should not be given to babies under 1 year old. Baby formula is sold in several forms including:    Ready-to-use. This is the most expensive, but no mixing is necessary.    Concentrated liquid. This is less expensive than ready-to-use and you mix with water.    Powder. This is the least expensive. You mix one level scoop of powdered formula with two ounces of water and stir well.    Most babies need 2.5 ounces of formula per pound of body weight each day. This means an 8-pound baby may drink about 20 ounces of formula a day; however, this is just an estimate. The most important thing is to pay attention to your baby s cues.  If your baby is always fussy, needs more iron or has certain food allergies, your physician may suggest you change your baby s formula to a different kind.   How do I warm my baby s bottles?  You may feed your baby a bottle without warming it first. It is OK for the breast milk or formula to be cool or room temperature. If your baby seems to prefer it warmed, you can put the filled bottle in a container of warm water and let it stand for a few minutes. Check the temperature of the liquid on your skin  before feeding it to your baby; to be sure it isn t too hot. Do not heat bottles in the microwave. Microwaves heat food and liquids unevenly, and this can cause hot spots that can burn your baby.    How do I clean and sterilize bottles?  Sterilize bottles and nipples before you use them for the first time. You can do this by putting them in boiling water for 5 minutes. After that first time, you can wash them in hot and soapy water. Rinse them carefully to be sure there is no soap left on them. You can also wash them in the .    [facility] Connection [phone] ([phone])

## 2021-06-24 NOTE — ANESTHESIA PROCEDURE NOTES
Epidural Block    Patient location during procedure: OB  Time Called: 3/7/2019 9:37 PM  Reason for Block:labor epidural  Staffing:  Performing  Anesthesiologist: Calderon Emanuel MD  Preanesthetic Checklist  Completed: patient identified, risks, benefits, and alternatives discussed, timeout performed, consent obtained, at patient's request, airway assessed, oxygen available, suction available, emergency drugs available and hand hygiene performed  Procedure  Patient position: sitting  Prep: ChloraPrep  Patient monitoring: continuous pulse oximetry, heart rate and blood pressure  Approach: midline  Location: L3-L4  Injection technique: KERRI saline  Number of Attempts:1  Needle  Needle type: Antonia   Needle gauge: 18 G     Catheter in Space: 5  Assessment  Sensory level:  No complications

## 2021-06-24 NOTE — ANESTHESIA PREPROCEDURE EVALUATION
Anesthesia Evaluation      Patient summary reviewed   No history of anesthetic complications     Airway   Mallampati: II   Pulmonary - negative ROS                          Cardiovascular - negative ROS  Rhythm: regular  Rate: normal,         Neuro/Psych - negative ROS     Endo/Other - negative ROS      GI/Hepatic/Renal - negative ROS           Dental                         Anesthesia Plan  Planned anesthetic: epidural  Discussed rare risks of bleeding, infection, nerve damage, failure and LAST. We discussed risks of headache, failure and low blood pressure. Pt wishes to proceed    ASA 2     Anesthetic plan and risks discussed with: patient and spouse

## 2021-06-24 NOTE — PATIENT INSTRUCTIONS - HE
Patient Education   HEALTHY PREGNANCY CARE: 34-36 WEEKS PREGNANT    Your baby is gaining about an ounce each day, so healthy nutrition and rest are still very important. Learn about late pregnancy symptoms, such as constipation and backaches. Drinking more fluids and eating more fiber can relieve constipation. The pelvic tilt and a heating pad can ease backaches.    Continue to watch for signs and symptoms of preeclampsia:     Sudden swelling of your face, hands, or feet     New vision problems such as blurring, double vision, or flashing lights    A severe headache not relieved with acetaminophen (Tylenol)    Sharp or stabbing pain in your right or middle upper abdomen    You're almost done with your pregnancy but it's still too soon to have your baby. The goal is to have your baby after 37 weeks, so watch for signs and symptoms of premature labor:     Regular contractions. This means having about 6 or more within 1 hour, even after you have had a glass of water and are resting.     A backache that starts and stops regularly.    An increase or change in vaginal discharge, such as heavy, mucus-like, watery, or bloody discharge.     Your water breaks or leaks.    You will be tested for group B streptococcus (GBS), a type of bacteria found in 10-30% of pregnant women. A woman with GBS can pass it to her baby during delivery. Most babies who get GBS from their mothers do not have any problems, but some babies get very ill and need to be hospitalized for antibiotic treatment. Treating you with antibiotics during labor and delivery helps to prevent infection in your baby.    Review information on postpartum care that you will need after the baby is born.  Discuss your choices and plans for birth control with your midwife or physician.     Postpartum Care  During the days and weeks after the delivery of your baby (postpartum period), your body will change as it returns to its nonpregnant condition. As with pregnancy  "changes, postpartum changes are different for every woman.    Physical changes after childbirth  The changes in your body may include:    Contractions called afterpains shrink the uterus for several days after childbirth. Shrinking of the uterus to its prepregnancy size may take 6 to 8 weeks.    Sore muscles (especially in the arms, neck, or jaw) are common after childbirth. This is because of the hard work of labor and pushing your baby out. The soreness should go away in a few days.    Bleeding and vaginal discharge (lochia) may last for 2 to 8 weeks, and can come and go for about 2 months.    Vaginal soreness, including pain, discomfort, and numbness, is common after vaginal birth. Soreness may be worse if you had a perineal tear or episiotomy.    If you had a  birth (), you may have pain in your lower abdomen and may need pain medicine for 1 to 2 weeks.    Breast engorgement is common around the 3rd or 4th day after delivery, when the breasts begin to fill with milk. This can cause discomfort and swelling. If you are breast feeding, the best treatment is to feed your baby often or pump the milk out. You can also use warm compresses. If you are not breast feeding, placing ice packs on your breasts, taking a hot shower, or using warm compresses may relieve the discomfort.    Be aware of postpartum depression    \"Baby blues\" are common for the first 1 to 2 weeks after birth. You may cry or feel sad or irritable for no reason.     For some women, these feelings last longer and are more intense. This is called postpartum depression.     If your symptoms last for more than a few weeks or you feel very depressed, ask your midwife or physician for help.     Postpartum depression can be treated. Support groups and counseling can help, but sometimes medication is needed.     Discuss follow-up appointments with your midwife or physician, as well. He or she will usually want to see you 6 weeks after the " birth of your baby, sooner if you are having problems.    If you have questions about any symptoms you are experiencing or any other concerns, call your provider or their clinic staff at [facility]/OB at Phone: [phone]. If it's after clinic hours, physician patients should call the [facility] at [phone] ([phone]); midwife patients should call their answering service at [phone].    How can you care for yourself at home?   You can refer to the Starting Out Right book or find it online at [email or web address]/[email or web address]/[email or web address]-Out-Right.pdf or [email or web address]/images/[email or web address]/flipbooks/[facility]-starting-out-right/[facility]-starting-out-right.html#p=8     You can sign up for a weekly parenting e-mail that gives support, tips and advice from health care professionals that starts with pregnancy and continues through the toddler years. To register, go to [email or web address] at any time during your pregnancy.        Making Plans for Feeding My Baby    By this point, you probably have read a lot about feeding your baby.  Breastfeed or formula? Each mother s decision is her own and [facility] respects you and your choices. We ve gathered information on both breastfeeding and formula feeding to help with your decision. Talking with your physician or nurse-midwife can also help in your decision.  However you plan to feed your baby, [facility] encourage rooming in with your baby, skin-to-skin contact and feeding your baby based on his or her cues.    Skin-to-skin contact  Being close to mom helps your baby adjust to life outside of the womb.  It helps your baby regulate their body temperature, heart rate, and breathing.  Your baby will usually be placed skin-to-skin immediately following birth or as soon as possible, if medical intervention is needed.    Rooming-In  Having  your baby stay with you in your room is called  rooming-in .  Keeping your baby in your room helps you to learn how to care for your baby by getting to know your baby s cues, body rhythms and sleep cycle.       Cue-based feeding  Cues (signals) are baby s way of telling you what he or she wants.  When you learn your infant s cues, you know how to care for and feed your baby.   Feeding cues are the licking and smacking of lips, bringing their fist to their mouth, and a reflex called  rooting - where baby turns and opens his or her mouth, searching for the breast or bottle.  Crying is a late feeding cue.  Babies can feed frequently, often at least 8 times in 24 hours.  Breastfeeding facts  Breast milk is the best source of nutrition for your baby and is available at birth. In the first couple of days, your milk volume is already starting to increase, though it may not be noticeable. Breastfeed frequently to increase your milk supply. Within three to five days, you will begin to notice larger milk volumes. An increase in breast size, heaviness and firmness are often described as the milk  coming in.  Frequent breastfeeding can help breasts from getting overly firm and painful. You will know the baby is getting enough milk if your baby is having wet and dirty diapers and gaining weight.     If your goal is to exclusively breastfeed, it is important to not use any formula or artificial nipples (including bottles and pacifiers) while your baby is learning to breastfeed.  While it may seem like an  easy  option to give your baby a bottle, formula should only be given if there is a medical reason for your baby to have it.    Positioning and attachment   Get comfortable.  Use pillows as needed to support your arms and baby.  Hold baby close at the level of your breast, facing you in a tummy to tummy position.  Skin to skin helps with this.  Position the baby with his or her nose by the nipple.  There should be a straight  line from baby s ear to shoulder to hips.  Tickle your baby s lips or wait for baby to open mouth wide, bring baby to breast by leading with the chin.  Aim the nipple at the roof of baby s mouth.  A rapid sucking pattern is followed by longer, drawing pattern with occasional swallows heard.  When baby is correctly latched, your nipple and much of the areola are pulled well into baby s mouth.      Returning to work or school  Focus on a good start to breastfeeding.  Many women continue to provide breastmilk for their baby when they return to work or school.  Making plans about where to pump and store milk can make the transition go well.  Talk with other mothers who have also returned to work or school for tips and support.  Your employer s Human Resource department may be a resource as well.     Returning to work or school: (continued)     babies can mean fewer  sick  days for you.    A quality breast pump will also save time and add comfort.  Check with your insurance prior to giving birth for breast pump coverage.  Many insurance companies include a pump within your benefits.    Wait until your baby is at least three weeks old to introduce a bottle for the first time.  Have someone besides you give the bottle.    Breastfeed when you are with your baby. Reserve your bottles of breast milk for when you are away.     Your breasts will need to be  emptied  either by your baby or a pump.  Plan to pump at least twice in an eight hour day.    If you cannot pump at work, continue breastfeeding at home. Any amount of breast milk is worth giving to your baby.    Formula feeding facts  If you are planning to use formula to feed your baby, you will want to make some preparations ahead of time. Talk to your doctor or nurse-midwife about what type of formula to use. Some are iron-fortified, meaning they have extra iron in them. You will want to purchase formula and bottles before your baby is born to be sure you are  ready after you return from the hospital. The [facility] do not provide formula samples to take home.    Be sure to follow formula mixing directions closely. Regular milk in the dairy case at the grocery store should not be given to babies under 1 year old. Baby formula is sold in several forms including:    Ready-to-use. This is the most expensive, but no mixing is necessary.    Concentrated liquid. This is less expensive than ready-to-use and you mix with water.    Powder. This is the least expensive. You mix one level scoop of powdered formula with two ounces of water and stir well.    Most babies need 2.5 ounces of formula per pound of body weight each day. This means an 8-pound baby may drink about 20 ounces of formula a day; however, this is just an estimate. The most important thing is to pay attention to your baby s cues.  If your baby is always fussy, needs more iron or has certain food allergies, your physician may suggest you change your baby s formula to a different kind.   How do I warm my baby s bottles?  You may feed your baby a bottle without warming it first. It is OK for the breast milk or formula to be cool or room temperature. If your baby seems to prefer it warmed, you can put the filled bottle in a container of warm water and let it stand for a few minutes. Check the temperature of the liquid on your skin before feeding it to your baby; to be sure it isn t too hot. Do not heat bottles in the microwave. Microwaves heat food and liquids unevenly, and this can cause hot spots that can burn your baby.    How do I clean and sterilize bottles?  Sterilize bottles and nipples before you use them for the first time. You can do this by putting them in boiling water for 5 minutes. After that first time, you can wash them in hot and soapy water. Rinse them carefully to be sure there is no soap left on them. You can also wash them in the .    [facility] Connection [phone]  ([phone])

## 2021-06-28 ENCOUNTER — AMBULATORY - HEALTHEAST (OUTPATIENT)
Dept: FAMILY MEDICINE | Facility: CLINIC | Age: 36
End: 2021-06-28

## 2021-06-28 ENCOUNTER — RECORDS - HEALTHEAST (OUTPATIENT)
Dept: FAMILY MEDICINE | Facility: CLINIC | Age: 36
End: 2021-06-28

## 2021-06-28 DIAGNOSIS — B00.9 HERPES SIMPLEX VIRUS INFECTION: ICD-10-CM

## 2021-06-28 RX ORDER — VALACYCLOVIR HYDROCHLORIDE 1 G/1
TABLET, FILM COATED ORAL
Qty: 8 TABLET | Refills: 11 | Status: SHIPPED | OUTPATIENT
Start: 2021-06-28 | End: 2022-03-21

## 2021-07-03 NOTE — ADDENDUM NOTE
Addendum Note by Nanda Rodriguez MD at 3/23/2020  1:10 PM     Author: Nanda Rodriguez MD Service: -- Author Type: Physician    Filed: 3/23/2020  1:16 PM Encounter Date: 3/23/2020 Status: Signed    : Nanda Rodriguez MD (Physician)    Addended by: NANDA RODRIGUEZ on: 3/23/2020 01:16 PM        Modules accepted: Level of Service

## 2021-07-03 NOTE — ADDENDUM NOTE
Addendum Note by Cassi Gant LPN at 1/2/2019  9:10 AM     Author: Cassi Gant LPN Service: -- Author Type: Licensed Nurse    Filed: 1/2/2019  9:43 AM Encounter Date: 1/2/2019 Status: Signed    : Cassi Gant LPN (Licensed Nurse)    Addended by: CASSI GANT on: 1/2/2019 09:43 AM        Modules accepted: Orders

## 2021-07-03 NOTE — ADDENDUM NOTE
Addendum Note by Nanda Rodriguze MD at 3/23/2020  1:10 PM     Author: Nanda Rodriguez MD Service: -- Author Type: Physician    Filed: 3/23/2020  2:30 PM Encounter Date: 3/23/2020 Status: Signed    : Nanda Rodriguez MD (Physician)    Addended by: NANDA RODRIGUEZ on: 3/23/2020 02:30 PM        Modules accepted: Orders

## 2021-07-14 PROBLEM — Z34.90 PREGNANT: Status: RESOLVED | Noted: 2019-03-07 | Resolved: 2019-04-22

## 2021-07-14 PROBLEM — Z32.01 PREGNANCY TEST POSITIVE: Status: RESOLVED | Noted: 2018-07-11 | Resolved: 2019-04-22

## 2021-07-14 PROBLEM — Z30.430 ENCOUNTER FOR IUD INSERTION: Status: RESOLVED | Noted: 2019-04-18 | Resolved: 2020-03-11

## 2021-07-26 DIAGNOSIS — Z32.01 PREGNANCY TEST POSITIVE: Primary | ICD-10-CM

## 2021-08-03 PROBLEM — I73.9 VASOSPASM (H): Status: RESOLVED | Noted: 2021-04-26 | Resolved: 2021-04-26

## 2021-08-13 ENCOUNTER — HOSPITAL ENCOUNTER (OUTPATIENT)
Dept: ULTRASOUND IMAGING | Facility: CLINIC | Age: 36
Discharge: HOME | End: 2021-08-13
Attending: FAMILY MEDICINE | Admitting: FAMILY MEDICINE
Payer: COMMERCIAL

## 2021-08-13 DIAGNOSIS — Z32.01 PREGNANCY TEST POSITIVE: ICD-10-CM

## 2021-08-13 PROCEDURE — 76801 OB US < 14 WKS SINGLE FETUS: CPT

## 2021-09-15 ENCOUNTER — PRENATAL OFFICE VISIT (OUTPATIENT)
Dept: FAMILY MEDICINE | Facility: CLINIC | Age: 36
End: 2021-09-15
Payer: COMMERCIAL

## 2021-09-15 VITALS
DIASTOLIC BLOOD PRESSURE: 62 MMHG | BODY MASS INDEX: 26.59 KG/M2 | HEART RATE: 68 BPM | WEIGHT: 145.4 LBS | SYSTOLIC BLOOD PRESSURE: 124 MMHG

## 2021-09-15 DIAGNOSIS — N89.8 VAGINAL DISCHARGE: ICD-10-CM

## 2021-09-15 DIAGNOSIS — Z83.49 FAMILY HISTORY OF HYPOTHYROIDISM: ICD-10-CM

## 2021-09-15 DIAGNOSIS — Z34.81 ENCOUNTER FOR SUPERVISION OF OTHER NORMAL PREGNANCY IN FIRST TRIMESTER: Primary | ICD-10-CM

## 2021-09-15 PROCEDURE — 90686 IIV4 VACC NO PRSV 0.5 ML IM: CPT | Performed by: FAMILY MEDICINE

## 2021-09-15 PROCEDURE — 90471 IMMUNIZATION ADMIN: CPT | Performed by: FAMILY MEDICINE

## 2021-09-15 PROCEDURE — 99213 OFFICE O/P EST LOW 20 MIN: CPT | Mod: 25 | Performed by: FAMILY MEDICINE

## 2021-09-15 PROCEDURE — 99207 PR FIRST OB VISIT: CPT | Performed by: FAMILY MEDICINE

## 2021-09-15 RX ORDER — DESVENLAFAXINE 50 MG/1
50 TABLET, FILM COATED, EXTENDED RELEASE ORAL DAILY
COMMUNITY
Start: 2021-09-01

## 2021-09-15 NOTE — PROGRESS NOTES
SUBJECTIVE:     HPI:    This is a 36 year old female patient,  who presents for her first obstetrical visit.    ROSA: 2022, by Ultrasound.  She is 11w4d weeks.  Her cycles are regular.  Her last menstrual period was normal.   Since her LMP, she has experienced  nausea, fatigue and breast tenderness). She does have some vaginal discharge  She denies pelvic pain, lightheadedness, urinary frequency and vaginal bleeding.    Additional History:      Have you travelled during the pregnancy?Yes, If yes, where?   if yes, who?    Have your sexual partner(s) travelled during the pregnancy?Yes, If yes, where?   if yes, who?          HISTORY:   Planned Pregnancy: Yes  Marital Status:   Occupation: physician  Living in Household: Spouse and Children    Past History:  Her past medical history   Past Medical History:   Diagnosis Date     Abnormal Pap smear of cervix      Depressive disorder      Encounter for IUD insertion 2019     HPV (human papilloma virus) infection      Insomnia       (normal spontaneous vaginal delivery) 3/8/2019     Vasospasm (H) 2021   .      She has a history of  normal term pregnancy    Since her last LMP she denies use of alcohol, tobacco and street drugs.    Past medical, surgical, social and family history were reviewed and updated in EPIC.          Current Outpatient Medications   Medication     desvenlafaxine (PRISTIQ) 50 MG 24 hr tablet     desvenlafaxine succinate 25 mg Tb24     valACYclovir (VALTREX) 1000 MG tablet     valACYclovir (VALTREX) 1000 MG tablet     zolpidem (AMBIEN) 5 MG tablet     No current facility-administered medications for this visit.       ROS:   12 point review of systems negative other than symptoms noted below or in the HPI.      OBJECTIVE:     EXAM:  /62 (BP Location: Right arm, Patient Position: Sitting, Cuff Size: Adult Regular)   Pulse 68   Wt 66 kg (145 lb 6.4 oz)   LMP 2021   BMI 26.59 kg/m   Body mass index is 26.59  kg/m .    GENERAL: healthy, alert and no distress  EYES: Eyes grossly normal to inspection, PERRL and conjunctivae and sclerae normal  HENT: ear canals and TM's normal, nose and mouth without ulcers or lesions  NECK: no adenopathy, no asymmetry, masses, or scars and thyroid normal to palpation  RESP: lungs clear to auscultation - no rales, rhonchi or wheezes  BREAST: normal without masses, tenderness or nipple discharge and no palpable axillary masses or adenopathy  CV: regular rate and rhythm, normal S1 S2, no S3 or S4, no murmur, click or rub, no peripheral edema and peripheral pulses strong  ABDOMEN: soft, nontender, no hepatosplenomegaly, no masses and bowel sounds normal   (female): normal female external genitalia, normal urethral meatus, vaginal mucosa pink, moist, well rugated, and normal cervix/adnexa/uterus without masses or discharge  MS: no gross musculoskeletal defects noted, no edema  SKIN: no suspicious lesions or rashes  NEURO: Normal strength and tone, mentation intact and speech normal  PSYCH: mentation appears normal, affect normal/bright    ASSESSMENT/PLAN:       ICD-10-CM    1. Family history of hypothyroidism  Z83.49 TSH with free T4 reflex       36 year old , 11w4d weeks of pregnancy with ROSA of 2022, by Ultrasound    Discussed as follows:level II ultrasound, [facility]    Counseling given:   - Follow up in 4-6 weeks for return OB visit.  - Recommended weight gain for pregnancy: 25-35 lbs.       PLAN/PATIENT INSTRUCTIONS:    Return 4 weeks     Nanda Rodriguez MD

## 2021-09-17 DIAGNOSIS — N89.8 VAGINAL DISCHARGE: ICD-10-CM

## 2021-09-17 DIAGNOSIS — Z34.81 ENCOUNTER FOR SUPERVISION OF OTHER NORMAL PREGNANCY IN FIRST TRIMESTER: ICD-10-CM

## 2021-09-17 LAB
CLUE CELLS: ABNORMAL
TRICHOMONAS, WET PREP: ABNORMAL
WBC'S/HIGH POWER FIELD, WET PREP: ABNORMAL
YEAST, WET PREP: PRESENT

## 2021-09-17 PROCEDURE — 87210 SMEAR WET MOUNT SALINE/INK: CPT

## 2021-10-07 ENCOUNTER — HOSPITAL ENCOUNTER (OUTPATIENT)
Facility: HOSPITAL | Age: 36
Discharge: HOME | End: 2021-10-07
Attending: OBSTETRICS & GYNECOLOGY | Admitting: OBSTETRICS & GYNECOLOGY
Payer: COMMERCIAL

## 2021-10-07 ENCOUNTER — HOSPITAL ENCOUNTER (OUTPATIENT)
Facility: HOSPITAL | Age: 36
End: 2021-10-07
Admitting: OBSTETRICS & GYNECOLOGY
Payer: COMMERCIAL

## 2021-10-07 PROCEDURE — G0463 HOSPITAL OUTPT CLINIC VISIT: HCPCS

## 2021-10-07 NOTE — PROGRESS NOTES
Pt was working on [facility] when she experienced an episode of vaginal bleeding and requested care.  has already been consulted for surgical delivery. He assessed the patient at the bedside and performed an ultrasound. FHTs and fetal movement present. Discussed further findings with patient. Pt instructed to avoid strenuous activity and intercourse and to contact MD if bleeding does not taper or if cramping is noted. Follow-up planned for 18 weeks at [facility].

## 2021-10-13 ENCOUNTER — PRENATAL OFFICE VISIT (OUTPATIENT)
Dept: FAMILY MEDICINE | Facility: CLINIC | Age: 36
End: 2021-10-13
Payer: COMMERCIAL

## 2021-10-13 VITALS
WEIGHT: 151.7 LBS | HEART RATE: 74 BPM | SYSTOLIC BLOOD PRESSURE: 118 MMHG | DIASTOLIC BLOOD PRESSURE: 62 MMHG | BODY MASS INDEX: 27.75 KG/M2

## 2021-10-13 DIAGNOSIS — Z34.82 ENCOUNTER FOR SUPERVISION OF OTHER NORMAL PREGNANCY IN SECOND TRIMESTER: Primary | ICD-10-CM

## 2021-10-13 PROCEDURE — 99207 PR PRENATAL VISIT: CPT | Performed by: FAMILY MEDICINE

## 2021-10-13 NOTE — PROGRESS NOTES
Concerns: subchorionic bleed second trimester 36 hours after Covid booster  Reportable signs and symptoms discussed.  Will schedule anatomy ultrasound.  RTC 4 weeks.      Nanda Rodriguez MD

## 2021-10-15 ENCOUNTER — TRANSCRIBE ORDERS (OUTPATIENT)
Dept: MATERNAL FETAL MEDICINE | Facility: CLINIC | Age: 36
End: 2021-10-15

## 2021-10-15 DIAGNOSIS — O26.90 PREGNANCY RELATED CONDITION, ANTEPARTUM: Primary | ICD-10-CM

## 2021-10-20 DIAGNOSIS — R09.81 CONGESTION OF PARANASAL SINUS: Primary | ICD-10-CM

## 2021-10-20 RX ORDER — TRIAMCINOLONE ACETONIDE 55 UG/1
2 SPRAY, METERED NASAL DAILY
Qty: 10.8 ML | Refills: 1 | Status: SHIPPED | OUTPATIENT
Start: 2021-10-20 | End: 2022-04-14

## 2021-10-26 ENCOUNTER — PRE VISIT (OUTPATIENT)
Dept: MATERNAL FETAL MEDICINE | Facility: CLINIC | Age: 36
End: 2021-10-26

## 2021-11-02 ENCOUNTER — OFFICE VISIT (OUTPATIENT)
Dept: MATERNAL FETAL MEDICINE | Facility: CLINIC | Age: 36
End: 2021-11-02
Attending: FAMILY MEDICINE
Payer: COMMERCIAL

## 2021-11-02 ENCOUNTER — HOSPITAL ENCOUNTER (OUTPATIENT)
Dept: ULTRASOUND IMAGING | Facility: CLINIC | Age: 36
End: 2021-11-02
Attending: FAMILY MEDICINE
Payer: COMMERCIAL

## 2021-11-02 DIAGNOSIS — O35.HXX0 CLUB FOOT OF FETUS AFFECTING ANTEPARTUM CARE OF MOTHER, SINGLE OR UNSPECIFIED FETUS: Primary | ICD-10-CM

## 2021-11-02 DIAGNOSIS — O26.90 PREGNANCY RELATED CONDITION, ANTEPARTUM: ICD-10-CM

## 2021-11-02 PROCEDURE — 76811 OB US DETAILED SNGL FETUS: CPT

## 2021-11-02 PROCEDURE — 76811 OB US DETAILED SNGL FETUS: CPT | Mod: 26 | Performed by: OBSTETRICS & GYNECOLOGY

## 2021-11-08 NOTE — PROGRESS NOTES
Aspen Fili was seen for an ultrasound today at the [facility] center.      For the details of the ultrasound please see the report which can be found under the imaging tab.      Amber Jacobs MD  , OB/GYN  Maternal-Fetal Medicine  [email or web address]  [phone] (Main M Office)  [id number] or [phone] (for 24 hour MFM questions)  [phone] (Pager)

## 2021-11-10 ENCOUNTER — PRENATAL OFFICE VISIT (OUTPATIENT)
Dept: FAMILY MEDICINE | Facility: CLINIC | Age: 36
End: 2021-11-10
Payer: COMMERCIAL

## 2021-11-10 VITALS
HEART RATE: 72 BPM | WEIGHT: 154.7 LBS | BODY MASS INDEX: 28.3 KG/M2 | SYSTOLIC BLOOD PRESSURE: 116 MMHG | DIASTOLIC BLOOD PRESSURE: 60 MMHG

## 2021-11-10 DIAGNOSIS — Z13.9 SCREENING FOR CONDITION: Primary | ICD-10-CM

## 2021-11-10 DIAGNOSIS — Z34.82 ENCOUNTER FOR SUPERVISION OF OTHER NORMAL PREGNANCY IN SECOND TRIMESTER: ICD-10-CM

## 2021-11-10 DIAGNOSIS — O23.599 BACTERIAL VAGINOSIS IN PREGNANCY: Primary | ICD-10-CM

## 2021-11-10 DIAGNOSIS — B96.89 BACTERIAL VAGINOSIS IN PREGNANCY: Primary | ICD-10-CM

## 2021-11-10 DIAGNOSIS — N89.8 VAGINAL DISCHARGE: ICD-10-CM

## 2021-11-10 PROBLEM — R87.619 ATYPICAL GLANDULAR CELLS ON CERVICAL PAP SMEAR: Status: ACTIVE | Noted: 2018-05-21

## 2021-11-10 LAB
CLUE CELLS: PRESENT
TRICHOMONAS, WET PREP: ABNORMAL
WBC'S/HIGH POWER FIELD, WET PREP: ABNORMAL
YEAST, WET PREP: ABNORMAL

## 2021-11-10 PROCEDURE — 87624 HPV HI-RISK TYP POOLED RSLT: CPT | Performed by: FAMILY MEDICINE

## 2021-11-10 PROCEDURE — G0123 SCREEN CERV/VAG THIN LAYER: HCPCS | Performed by: FAMILY MEDICINE

## 2021-11-10 PROCEDURE — 87210 SMEAR WET MOUNT SALINE/INK: CPT | Performed by: FAMILY MEDICINE

## 2021-11-10 PROCEDURE — 99207 PR PRENATAL VISIT: CPT | Performed by: FAMILY MEDICINE

## 2021-11-10 RX ORDER — METRONIDAZOLE 500 MG/1
500 TABLET ORAL 3 TIMES DAILY
Qty: 30 TABLET | Refills: 1 | Status: SHIPPED | OUTPATIENT
Start: 2021-11-10 | End: 2021-12-15

## 2021-11-10 NOTE — PROGRESS NOTES
Concerns today: club food in the baby;  moved  No nausea/vomiting. No heartburn.  No vaginal bleeding, no contractions/severe cramping, no leakage of fluid.  No vaginal discharge. No dysuria  No headache, vision changes, lower extremity swelling, upper abdominal pain, chest pain, shortness of breath.       Nanda Rodriguez MD

## 2021-11-10 NOTE — PROGRESS NOTES
Level II ultrasound shows right club foot; will repeat @ 28 weeks  Some vaginal discharge so wet prep done today  Pap completed today

## 2021-11-10 NOTE — ADDENDUM NOTE
Addended by: CHLOÉ LYLE on: 11/10/2021 03:27 PM     Modules accepted: Orders, Level of Service, SmartSet

## 2021-11-12 DIAGNOSIS — O23.599 BACTERIAL VAGINOSIS IN PREGNANCY: Primary | ICD-10-CM

## 2021-11-12 DIAGNOSIS — B96.89 BACTERIAL VAGINOSIS IN PREGNANCY: Primary | ICD-10-CM

## 2021-11-12 RX ORDER — CLINDAMYCIN HCL 300 MG
300 CAPSULE ORAL 2 TIMES DAILY
Qty: 14 CAPSULE | Refills: 1 | Status: SHIPPED | OUTPATIENT
Start: 2021-11-12 | End: 2021-11-19

## 2021-11-15 LAB
HUMAN PAPILLOMA VIRUS 16 DNA: NEGATIVE
HUMAN PAPILLOMA VIRUS 18 DNA: NEGATIVE
HUMAN PAPILLOMA VIRUS FINAL DIAGNOSIS: ABNORMAL
HUMAN PAPILLOMA VIRUS OTHER HR: POSITIVE

## 2021-11-16 ENCOUNTER — LAB (OUTPATIENT)
Dept: LAB | Facility: CLINIC | Age: 36
End: 2021-11-16
Payer: COMMERCIAL

## 2021-11-16 DIAGNOSIS — R05.9 COUGH: Primary | ICD-10-CM

## 2021-11-16 DIAGNOSIS — R05.9 COUGH: ICD-10-CM

## 2021-11-16 LAB — SARS-COV-2 RNA RESP QL NAA+PROBE: NEGATIVE

## 2021-11-16 PROCEDURE — U0003 INFECTIOUS AGENT DETECTION BY NUCLEIC ACID (DNA OR RNA); SEVERE ACUTE RESPIRATORY SYNDROME CORONAVIRUS 2 (SARS-COV-2) (CORONAVIRUS DISEASE [COVID-19]), AMPLIFIED PROBE TECHNIQUE, MAKING USE OF HIGH THROUGHPUT TECHNOLOGIES AS DESCRIBED BY CMS-2020-01-R: HCPCS

## 2021-11-16 PROCEDURE — U0005 INFEC AGEN DETEC AMPLI PROBE: HCPCS

## 2021-11-22 ENCOUNTER — PATIENT OUTREACH (OUTPATIENT)
Dept: FAMILY MEDICINE | Facility: CLINIC | Age: 36
End: 2021-11-22
Payer: COMMERCIAL

## 2021-11-22 DIAGNOSIS — R87.610 ASCUS WITH POSITIVE HIGH RISK HPV CERVICAL: ICD-10-CM

## 2021-11-22 DIAGNOSIS — R87.810 ASCUS WITH POSITIVE HIGH RISK HPV CERVICAL: ICD-10-CM

## 2021-11-22 LAB
BKR LAB AP GYN ADEQUACY: NORMAL
BKR LAB AP GYN INTERPRETATION: NORMAL
BKR LAB AP HPV REFLEX: NORMAL
BKR LAB AP PREVIOUS ABNL DX: NORMAL
BKR LAB AP PREVIOUS ABNORMAL: NORMAL
PATH REPORT.COMMENTS IMP SPEC: NORMAL
PATH REPORT.COMMENTS IMP SPEC: NORMAL
PATH REPORT.RELEVANT HX SPEC: NORMAL

## 2021-12-05 ENCOUNTER — HEALTH MAINTENANCE LETTER (OUTPATIENT)
Age: 36
End: 2021-12-05

## 2021-12-15 ENCOUNTER — PRENATAL OFFICE VISIT (OUTPATIENT)
Dept: FAMILY MEDICINE | Facility: CLINIC | Age: 36
End: 2021-12-15
Payer: COMMERCIAL

## 2021-12-15 VITALS
BODY MASS INDEX: 29.41 KG/M2 | DIASTOLIC BLOOD PRESSURE: 54 MMHG | SYSTOLIC BLOOD PRESSURE: 118 MMHG | HEART RATE: 72 BPM | WEIGHT: 160.8 LBS

## 2021-12-15 DIAGNOSIS — Z34.82 ENCOUNTER FOR SUPERVISION OF OTHER NORMAL PREGNANCY IN SECOND TRIMESTER: ICD-10-CM

## 2021-12-15 LAB
GLUCOSE 1H P 50 G GLC PO SERPL-MCNC: 116 MG/DL (ref 70–129)
HGB BLD-MCNC: 11.2 G/DL (ref 11.7–15.7)

## 2021-12-15 PROCEDURE — 82950 GLUCOSE TEST: CPT | Performed by: FAMILY MEDICINE

## 2021-12-15 PROCEDURE — 99207 PR PRENATAL VISIT: CPT | Performed by: FAMILY MEDICINE

## 2021-12-15 PROCEDURE — 36415 COLL VENOUS BLD VENIPUNCTURE: CPT | Performed by: FAMILY MEDICINE

## 2021-12-15 NOTE — PROGRESS NOTES
Concerns: doing well   No vaginal bleeding, no contractions, no leakage of fluid  No nausea/vomiting. No heartburn  No vaginal discharge. No dysuria.   No headache, vision changes, lower extremity swelling, upper abdominal pain, chest pain, shortness of breath  Tdap planned next visit  Discussed PTL, PROM, and when to call or come in.  Normal anatomy ultrasound.  RTC 4 weeks.  GTT and labs today       Nanda Rodriguez MD

## 2021-12-31 ENCOUNTER — HOSPITAL ENCOUNTER (OUTPATIENT)
Dept: PHYSICAL THERAPY | Facility: REHABILITATION | Age: 36
End: 2021-12-31
Payer: COMMERCIAL

## 2021-12-31 PROCEDURE — 97140 MANUAL THERAPY 1/> REGIONS: CPT | Mod: GP | Performed by: PHYSICAL THERAPIST

## 2021-12-31 PROCEDURE — 97110 THERAPEUTIC EXERCISES: CPT | Mod: GP | Performed by: PHYSICAL THERAPIST

## 2021-12-31 PROCEDURE — 97161 PT EVAL LOW COMPLEX 20 MIN: CPT | Mod: GP | Performed by: PHYSICAL THERAPIST

## 2021-12-31 NOTE — PROGRESS NOTES
12/31/21 0800   General Information   Type of Visit Initial OP Ortho PT Evaluation   Start of Care Date 12/31/21   Referring Physician Direct Access   Orders Per Therapist Evaluation   Certification Required? No   Medical Diagnosis Pubic symphysis pain   Surgical/Medical history reviewed Yes   Body Part(s)   Body Part(s) Lumbar Spine/SI   Presentation and Etiology   Pertinent history of current problem (include personal factors and/or comorbidities that impact the POC) Patient is presenting with two main issues. She is presenting with increased pain with the sit to stand transfer. She notes this has been gradual onset with no injury that started about 11/2021. She is almost 27 weeks gestation. She is reporting different back pain, Mid thoracic pain when she is sleeping it occurs equally on both sides. She is denying any popping in her pelvis. Not really exercising right now. Goal: prenatal yoga. Standing transfer at the end of the day 4/10. Does have exercise ball at home. Negative radicular pain. She will be having a hysterectomy following this delivery.    Impairments A. 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 No   Have you fallen and had an injury in the past year? No   Is patient a fall risk? No   Fall screen comments No   Abuse Screen (yes response referral indicated)   Feels Unsafe at Home or Work/School no   Feels Threatened by Someone no   Does Anyone Try to Keep You From Having Contact with Others or Doing Things Outside Your Home?  no   Physical Signs of Abuse Present no   Lumbar Spine/SI Objective Findings   Observation level pelvis   Posture flat t-spine   Gait/Locomotion not impaired   Flexion ROM 0 cm finger tip to floor, no pain   Extension ROM no loss no pain   Right Side Bending ROM no loss no pain   Left Side Bending ROM No loss no pain   Hip Flexion (L2) Strength B 4+   Hip Adduction Strength b 4 and painful   Lumbar/Hip/Knee/Foot Strength Comments remaining strength tests for the LE 5/5   Hamstring Flexibility 90 B   Palpation level pubic bone pain with PA   Planned Therapy Interventions   Planned Therapy Interventions manual therapy;neuromuscular re-education;ROM;strengthening;stretching   Clinical Impression   Criteria for Skilled Therapeutic Interventions Met yes, treatment indicated   PT Diagnosis pubic bone pain   Influenced by the following impairments tight adductor longus B, thoracic paraspinal tension   Functional limitations due to impairments sit to stand transfer   Clinical Presentation Stable/Uncomplicated   Clinical Decision Making (Complexity) Low complexity   Therapy Frequency 1 time/week   Predicted Duration of Therapy Intervention (days/wks) every 2-3 weeks for total 8 visits   Risk & Benefits of therapy have been explained Yes   Patient, Family & other staff in agreement with plan of care Yes   Clinical Impression Comments Patient is presenting with B pubic symphysis pain that began 11/2021 insidiously. She is crruently pregnant and almost 27 weeks gestation. She is noting no previous history of this pain with her previous pregnancy. She feels pretty good moving around but only notes the pain when she transfer sit to stand. PT eval reveal postive PA pain provocation at the pubic symphysis, tight adductor longus B, thoracic paraspinals tension but otherwise negative exam. PT initiated manual therapy and home exercises to address.    Ortho Goal 1   Goal Identifier 1   Goal Description Patient will be able to transfer  sit to stand from chair <2/10 pain at the end of her work shift in 8 weeks   Target Date 03/01/22   Ortho Goal 2   Goal Identifier 2   Goal Description Patient will re-initiate yoga in 4 weeks for movement management.   Target Date 01/30/22   Total Evaluation Time   PT Rod Low Complexity Minutes (98983) 20

## 2021-12-31 NOTE — PROGRESS NOTES
"History of Present Illness:  Patient is presenting with two main issues. She is presenting with increased pain with the sit to stand transfer. She notes this has been gradual onset with no injury that started about 11/2021. She is almost 27 weeks gestation. She is reporting different back pain, Mid thoracic pain when she is sleeping it occurs equally on both sides. She is denying any popping in her pelvis. Not really exercising right now. Goal: prenatal yoga. Standing transfer at the end of the day 4/10. Does have exercise ball at home. Negative radicular pain. She will be having a hysterectomy following this delivery.     Date 12/3/21/21   Exercise    supine butterfly stretch 2 x 30\"   SL reach and roll X 5 hold 5 seconds   All 4's  1) rocking x 5  2) Cat/cow #1-2 x 10                                             "

## 2022-01-12 ENCOUNTER — ALLIED HEALTH/NURSE VISIT (OUTPATIENT)
Dept: FAMILY MEDICINE | Facility: CLINIC | Age: 37
End: 2022-01-12
Payer: COMMERCIAL

## 2022-01-12 DIAGNOSIS — Z20.822 SUSPECTED 2019 NOVEL CORONAVIRUS INFECTION: Primary | ICD-10-CM

## 2022-01-12 DIAGNOSIS — Z20.822 SUSPECTED 2019 NOVEL CORONAVIRUS INFECTION: ICD-10-CM

## 2022-01-12 LAB — SARS-COV-2 RNA RESP QL NAA+PROBE: POSITIVE

## 2022-01-12 PROCEDURE — 99207 PR NO CHARGE NURSE ONLY: CPT

## 2022-01-12 PROCEDURE — U0003 INFECTIOUS AGENT DETECTION BY NUCLEIC ACID (DNA OR RNA); SEVERE ACUTE RESPIRATORY SYNDROME CORONAVIRUS 2 (SARS-COV-2) (CORONAVIRUS DISEASE [COVID-19]), AMPLIFIED PROBE TECHNIQUE, MAKING USE OF HIGH THROUGHPUT TECHNOLOGIES AS DESCRIBED BY CMS-2020-01-R: HCPCS

## 2022-01-12 PROCEDURE — U0005 INFEC AGEN DETEC AMPLI PROBE: HCPCS

## 2022-01-19 ENCOUNTER — HOSPITAL ENCOUNTER (OUTPATIENT)
Dept: PHYSICAL THERAPY | Facility: REHABILITATION | Age: 37
End: 2022-01-19
Payer: COMMERCIAL

## 2022-01-19 DIAGNOSIS — M53.3 SACRAL PAIN: Primary | ICD-10-CM

## 2022-01-19 PROCEDURE — 97140 MANUAL THERAPY 1/> REGIONS: CPT | Mod: GP | Performed by: PHYSICAL THERAPIST

## 2022-01-19 PROCEDURE — 97110 THERAPEUTIC EXERCISES: CPT | Mod: GP | Performed by: PHYSICAL THERAPIST

## 2022-01-26 ENCOUNTER — PRENATAL OFFICE VISIT (OUTPATIENT)
Dept: FAMILY MEDICINE | Facility: CLINIC | Age: 37
End: 2022-01-26
Payer: COMMERCIAL

## 2022-01-26 VITALS
WEIGHT: 167 LBS | DIASTOLIC BLOOD PRESSURE: 68 MMHG | BODY MASS INDEX: 30.54 KG/M2 | SYSTOLIC BLOOD PRESSURE: 118 MMHG | HEART RATE: 74 BPM

## 2022-01-26 DIAGNOSIS — R23.8 CHILBLAIN-LIKE LESIONS OF MULTIPLE TOES: ICD-10-CM

## 2022-01-26 DIAGNOSIS — B96.89 BACTERIAL VAGINOSIS IN PREGNANCY: Primary | ICD-10-CM

## 2022-01-26 DIAGNOSIS — O23.599 BACTERIAL VAGINOSIS IN PREGNANCY: Primary | ICD-10-CM

## 2022-01-26 DIAGNOSIS — Z34.83 ENCOUNTER FOR SUPERVISION OF OTHER NORMAL PREGNANCY IN THIRD TRIMESTER: ICD-10-CM

## 2022-01-26 DIAGNOSIS — O35.HXX0 CLUB FOOT OF FETUS AFFECTING ANTEPARTUM CARE OF MOTHER, SINGLE OR UNSPECIFIED FETUS: Primary | ICD-10-CM

## 2022-01-26 DIAGNOSIS — N89.8 VAGINAL DISCHARGE: ICD-10-CM

## 2022-01-26 PROCEDURE — 99213 OFFICE O/P EST LOW 20 MIN: CPT | Mod: 25 | Performed by: FAMILY MEDICINE

## 2022-01-26 PROCEDURE — 99207 PR PRENATAL VISIT: CPT | Performed by: FAMILY MEDICINE

## 2022-01-26 PROCEDURE — 87210 SMEAR WET MOUNT SALINE/INK: CPT | Performed by: FAMILY MEDICINE

## 2022-01-26 PROCEDURE — 90471 IMMUNIZATION ADMIN: CPT | Performed by: FAMILY MEDICINE

## 2022-01-26 PROCEDURE — 90715 TDAP VACCINE 7 YRS/> IM: CPT | Performed by: FAMILY MEDICINE

## 2022-01-26 RX ORDER — CLINDAMYCIN HCL 300 MG
300 CAPSULE ORAL 2 TIMES DAILY
Qty: 14 CAPSULE | Refills: 0 | Status: SHIPPED | OUTPATIENT
Start: 2022-01-26 | End: 2022-02-02

## 2022-01-26 NOTE — PROGRESS NOTES
Concerns: vaginal discharge  Doing well.  No concerns today.  No contractions.  Discussed kick counts and fetal movement.  Discussed PTL, PROM, and when to call or come in.  RTC 2 weeks.    Nanda Rodriguez MD

## 2022-01-31 ENCOUNTER — HOSPITAL ENCOUNTER (OUTPATIENT)
Dept: PHYSICAL THERAPY | Facility: REHABILITATION | Age: 37
End: 2022-01-31
Payer: COMMERCIAL

## 2022-01-31 DIAGNOSIS — M53.3 SACRAL PAIN: Primary | ICD-10-CM

## 2022-01-31 PROCEDURE — 97110 THERAPEUTIC EXERCISES: CPT | Mod: GP | Performed by: PHYSICAL THERAPIST

## 2022-01-31 PROCEDURE — 97140 MANUAL THERAPY 1/> REGIONS: CPT | Mod: GP | Performed by: PHYSICAL THERAPIST

## 2022-01-31 NOTE — PROGRESS NOTES
"Daily Assessment:  Nishi is presenting for follow-up of sacral pain and pubic symphysis pain in pregnancy. She is noting much improvement overall with less pain at night and less pain getting up from her chair at work. She is having some dysfunction with the arm and leg extension in all fours.     Date 1/31/22 1/19/22 12/3/21/21   Exercise      supine butterfly stretch   2 x 30\"   SL reach and roll  X 5 hold 5 seconds X 5 hold 5 seconds   All 4's  1) rocking x 5  2) Cat/cow  3)alt arm/leg       3) x2 2-->cued to dec arch #3 x 5 #1-2 x 10   swissball  1)pelvic tilts  2) bouncing  3) marching  4)LAQ's #1-4     Orangeband shoulder extension with scap set X 10                                                     "

## 2022-02-09 ENCOUNTER — PRENATAL OFFICE VISIT (OUTPATIENT)
Dept: FAMILY MEDICINE | Facility: CLINIC | Age: 37
End: 2022-02-09
Payer: COMMERCIAL

## 2022-02-09 VITALS — BODY MASS INDEX: 31.06 KG/M2 | SYSTOLIC BLOOD PRESSURE: 124 MMHG | WEIGHT: 169.8 LBS | DIASTOLIC BLOOD PRESSURE: 72 MMHG

## 2022-02-09 DIAGNOSIS — Z34.83 ENCOUNTER FOR SUPERVISION OF OTHER NORMAL PREGNANCY IN THIRD TRIMESTER: ICD-10-CM

## 2022-02-09 DIAGNOSIS — N89.8 VAGINAL DISCHARGE: Primary | ICD-10-CM

## 2022-02-09 LAB
CLUE CELLS: NORMAL
TRICHOMONAS, WET PREP: NORMAL
WBC'S/HIGH POWER FIELD, WET PREP: NORMAL
YEAST, WET PREP: NORMAL

## 2022-02-09 PROCEDURE — 99207 PR PRENATAL VISIT: CPT | Performed by: FAMILY MEDICINE

## 2022-02-09 PROCEDURE — 87210 SMEAR WET MOUNT SALINE/INK: CPT | Performed by: FAMILY MEDICINE

## 2022-02-09 RX ORDER — CLINDAMYCIN PHOSPHATE 10 MG/G
GEL TOPICAL DAILY
Qty: 60 G | Refills: 1 | Status: SHIPPED | OUTPATIENT
Start: 2022-02-09 | End: 2022-02-16

## 2022-02-09 NOTE — PROGRESS NOTES
Vaginal discharge may need clindamycin vaginally    Concerns: vaginal discharge, lots of contractions a few weeks ago  No vaginal bleeding, LOF.  Cervix is soft, not dilated.  Discussed kick counts and fetal movement.  Discussed PTL, PROM, and when to call or come in.  RTC 2 weeks.    Nanda Rodriguez MD

## 2022-02-23 ENCOUNTER — OFFICE VISIT (OUTPATIENT)
Dept: MATERNAL FETAL MEDICINE | Facility: CLINIC | Age: 37
End: 2022-02-23
Attending: OBSTETRICS & GYNECOLOGY
Payer: COMMERCIAL

## 2022-02-23 ENCOUNTER — PRENATAL OFFICE VISIT (OUTPATIENT)
Dept: FAMILY MEDICINE | Facility: CLINIC | Age: 37
End: 2022-02-23
Payer: COMMERCIAL

## 2022-02-23 ENCOUNTER — HOSPITAL ENCOUNTER (OUTPATIENT)
Dept: ULTRASOUND IMAGING | Facility: CLINIC | Age: 37
End: 2022-02-23
Attending: OBSTETRICS & GYNECOLOGY
Payer: COMMERCIAL

## 2022-02-23 ENCOUNTER — HOSPITAL ENCOUNTER (OUTPATIENT)
Dept: CARDIOLOGY | Facility: CLINIC | Age: 37
End: 2022-02-23
Attending: OBSTETRICS & GYNECOLOGY
Payer: COMMERCIAL

## 2022-02-23 VITALS
SYSTOLIC BLOOD PRESSURE: 118 MMHG | BODY MASS INDEX: 31.31 KG/M2 | WEIGHT: 171.2 LBS | DIASTOLIC BLOOD PRESSURE: 68 MMHG | HEART RATE: 69 BPM

## 2022-02-23 DIAGNOSIS — O35.HXX0 CLUB FOOT OF FETUS AFFECTING ANTEPARTUM CARE OF MOTHER, SINGLE OR UNSPECIFIED FETUS: ICD-10-CM

## 2022-02-23 DIAGNOSIS — O35.HXX0 CLUB FOOT OF FETUS AFFECTING ANTEPARTUM CARE OF MOTHER, SINGLE OR UNSPECIFIED FETUS: Primary | ICD-10-CM

## 2022-02-23 DIAGNOSIS — Z34.80 PRENATAL CARE, SUBSEQUENT PREGNANCY, UNSPECIFIED TRIMESTER: Primary | ICD-10-CM

## 2022-02-23 PROCEDURE — 93325 DOPPLER ECHO COLOR FLOW MAPG: CPT

## 2022-02-23 PROCEDURE — 93325 DOPPLER ECHO COLOR FLOW MAPG: CPT | Mod: 26 | Performed by: PEDIATRICS

## 2022-02-23 PROCEDURE — 99207 PR PRENATAL VISIT: CPT | Performed by: FAMILY MEDICINE

## 2022-02-23 PROCEDURE — 76816 OB US FOLLOW-UP PER FETUS: CPT | Mod: 26 | Performed by: OBSTETRICS & GYNECOLOGY

## 2022-02-23 PROCEDURE — 76825 ECHO EXAM OF FETAL HEART: CPT | Mod: 26 | Performed by: PEDIATRICS

## 2022-02-23 PROCEDURE — 76816 OB US FOLLOW-UP PER FETUS: CPT

## 2022-02-23 PROCEDURE — 76827 ECHO EXAM OF FETAL HEART: CPT | Mod: 26 | Performed by: PEDIATRICS

## 2022-02-23 NOTE — PROGRESS NOTES
"Please see \"Imaging\" tab under \"Chart Review\" for details of today's visit.    Amber Periera MD PhD  Maternal Fetal Medicine     "

## 2022-02-23 NOTE — PROGRESS NOTES
Level II ultrasound confirms right club foot    Concerns: just reviewed another level II ultrasound confirms right club foot; appointment with Luz already scheduled  Doing well.  No concerns today.  No vaginal bleeding, LOF.  No contractions.  Discussed kick counts and fetal movement.  Discussed PTL, PROM, and when to call or come in.  RTC 2 weeks.    Nanda Rodriguez MD

## 2022-03-09 ENCOUNTER — PRENATAL OFFICE VISIT (OUTPATIENT)
Dept: FAMILY MEDICINE | Facility: CLINIC | Age: 37
End: 2022-03-09
Payer: COMMERCIAL

## 2022-03-09 VITALS
BODY MASS INDEX: 31.15 KG/M2 | SYSTOLIC BLOOD PRESSURE: 122 MMHG | HEART RATE: 74 BPM | WEIGHT: 170.3 LBS | DIASTOLIC BLOOD PRESSURE: 68 MMHG

## 2022-03-09 DIAGNOSIS — Z34.83 ENCOUNTER FOR SUPERVISION OF OTHER NORMAL PREGNANCY IN THIRD TRIMESTER: Primary | ICD-10-CM

## 2022-03-09 PROCEDURE — 99207 PR PRENATAL VISIT: CPT | Performed by: FAMILY MEDICINE

## 2022-03-09 PROCEDURE — 87653 STREP B DNA AMP PROBE: CPT | Performed by: FAMILY MEDICINE

## 2022-03-09 NOTE — PROGRESS NOTES
Concerns: occasional contractions  .  No HA, RUQ pain, N/V, visual changes.  Cervix is midline; 1 cm; soft;40-50%.  GBS done today.  Labor precautions discussed.  RTC 1 week.  Prenatal flowsheet information is reviewed.    Nanda Rodriguez MD

## 2022-03-10 LAB — GP B STREP DNA SPEC QL NAA+PROBE: NEGATIVE

## 2022-03-15 ENCOUNTER — TRANSFERRED RECORDS (OUTPATIENT)
Dept: HEALTH INFORMATION MANAGEMENT | Facility: CLINIC | Age: 37
End: 2022-03-15
Payer: COMMERCIAL

## 2022-03-16 ENCOUNTER — PRENATAL OFFICE VISIT (OUTPATIENT)
Dept: FAMILY MEDICINE | Facility: CLINIC | Age: 37
End: 2022-03-16
Payer: COMMERCIAL

## 2022-03-16 VITALS — WEIGHT: 172.5 LBS | DIASTOLIC BLOOD PRESSURE: 68 MMHG | BODY MASS INDEX: 31.55 KG/M2 | SYSTOLIC BLOOD PRESSURE: 118 MMHG

## 2022-03-16 DIAGNOSIS — Z34.83 ENCOUNTER FOR SUPERVISION OF OTHER NORMAL PREGNANCY IN THIRD TRIMESTER: Primary | ICD-10-CM

## 2022-03-16 DIAGNOSIS — Z34.80 PRENATAL CARE, SUBSEQUENT PREGNANCY, UNSPECIFIED TRIMESTER: ICD-10-CM

## 2022-03-16 LAB — HGB BLD-MCNC: 11.7 G/DL (ref 11.7–15.7)

## 2022-03-16 PROCEDURE — 36415 COLL VENOUS BLD VENIPUNCTURE: CPT | Performed by: FAMILY MEDICINE

## 2022-03-16 PROCEDURE — 99207 PR PRENATAL VISIT: CPT | Performed by: FAMILY MEDICINE

## 2022-03-16 NOTE — PROGRESS NOTES
Luz consult for clubfoot very helpful; casting at 4 weeks, weekly for 3 months then bar and shoe at night  Concerns: feeling well  No vaginal bleeding, LOF, contractions.  No HA, RUQ pain, N/V, visual changes.  Cervix check next visit.  Labor precautions discussed.  RTC 1 week.    Nanda Rodriguez MD

## 2022-03-21 ENCOUNTER — OFFICE VISIT (OUTPATIENT)
Dept: FAMILY MEDICINE | Facility: CLINIC | Age: 37
End: 2022-03-21
Payer: COMMERCIAL

## 2022-03-21 VITALS
HEART RATE: 77 BPM | WEIGHT: 172 LBS | OXYGEN SATURATION: 97 % | BODY MASS INDEX: 31.46 KG/M2 | SYSTOLIC BLOOD PRESSURE: 112 MMHG | TEMPERATURE: 97.9 F | DIASTOLIC BLOOD PRESSURE: 60 MMHG

## 2022-03-21 DIAGNOSIS — J20.9 ACUTE BRONCHITIS, UNSPECIFIED ORGANISM: Primary | ICD-10-CM

## 2022-03-21 PROCEDURE — 99213 OFFICE O/P EST LOW 20 MIN: CPT | Performed by: FAMILY MEDICINE

## 2022-03-21 RX ORDER — AZITHROMYCIN 250 MG/1
TABLET, FILM COATED ORAL
Qty: 6 TABLET | Refills: 0 | Status: SHIPPED | OUTPATIENT
Start: 2022-03-21 | End: 2022-03-23 | Stop reason: ALTCHOICE

## 2022-03-21 NOTE — PROGRESS NOTES
Assessment & Plan     Acute bronchitis, unspecified organism  We will treat with a azithromycin.  Counseled on use of medication and side effects.  Continue Afrin and guaifenesin.  Encouraged increased rest and fluids.  Follow-up if symptoms worsen, do not improve or if new concerns develop.  - azithromycin (ZITHROMAX) 250 MG tablet  Dispense: 6 tablet; Refill: 0                   No follow-ups on file.    Carla Khanna MD  [facility] VERONA Almodovar is a 36 year old who presents for the following health issues     HPI   She is seen today for concern of cough and chest congestion.  She is currently 38 weeks pregnant.  Symptoms started about 7 days ago.  She has had cough and rhinorrhea.  Cough is productive of greenish phlegm.  She has not had fevers or chills.  Has had occasional sore throat.  Ears occasionally popping but no pain in her ears.  No sinus pain or pressure.  No shortness of breath.  No significant wheezing.  Has difficulty sleeping at night because of her discomfort.  She has been using Afrin spray and guaifenesin for relief of symptoms.  She is just not feeling any better.  Son is sick with similar symptoms.  Had negative Covid test about 5 days ago.  Review of systems is otherwise negative.  No other concerns or questions today.        Review of Systems         Objective    /60   Pulse 77   Temp 97.9  F (36.6  C) (Temporal)   Wt 78 kg (172 lb)   LMP 06/30/2021   SpO2 97%   BMI 31.46 kg/m    Body mass index is 31.46 kg/m .  Physical Exam   GENERAL: healthy, alert and no distress  EYES: Eyes grossly normal to inspection, PERRL and conjunctivae and sclerae normal  HENT: ear canals and TM's normal, nose and mouth without ulcers or lesions  NECK: no adenopathy  RESP: lungs clear to auscultation - no rales, rhonchi or wheezes  CV: regular rate and rhythm, normal S1 S2, no S3 or S4, no murmur, click or rub, no peripheral edema and peripheral pulses strong  PSYCH:  mentation appears normal, affect normal/bright

## 2022-03-22 ENCOUNTER — HOSPITAL ENCOUNTER (OUTPATIENT)
Facility: CLINIC | Age: 37
Discharge: HOME | End: 2022-03-22
Attending: FAMILY MEDICINE | Admitting: FAMILY MEDICINE
Payer: COMMERCIAL

## 2022-03-22 VITALS — DIASTOLIC BLOOD PRESSURE: 67 MMHG | SYSTOLIC BLOOD PRESSURE: 121 MMHG

## 2022-03-22 PROBLEM — Z36.89 ENCOUNTER FOR TRIAGE IN PREGNANT PATIENT: Status: ACTIVE | Noted: 2021-10-07

## 2022-03-22 LAB — RUPTURE OF FETAL MEMBRANES BY ROM PLUS: NEGATIVE

## 2022-03-22 PROCEDURE — 84112 EVAL AMNIOTIC FLUID PROTEIN: CPT | Performed by: FAMILY MEDICINE

## 2022-03-22 PROCEDURE — G0463 HOSPITAL OUTPT CLINIC VISIT: HCPCS

## 2022-03-22 RX ORDER — LIDOCAINE 40 MG/G
CREAM TOPICAL
Status: DISCONTINUED | OUTPATIENT
Start: 2022-03-22 | End: 2022-03-22 | Stop reason: HOSPADM

## 2022-03-22 NOTE — DISCHARGE INSTRUCTIONS
Discharge Instruction for Undelivered Patients      You were seen for: Membrane Assessment  We Consulted: Dr Rodriguez  You had (Test or Medicine):ROM+    Diet:   Drink 8 to 12 glasses of liquids (milk, juice, water) every day.     Activity:  Call your doctor or nurse midwife if your baby is moving less than usual.     Call your provider if you notice:  Swelling in your face or increased swelling in your hands or legs.  Headaches that are not relieved by Tylenol (acetaminophen).  Changes in your vision (blurring: seeing spots or stars.)  Nausea (sick to your stomach) and vomiting (throwing up).   Weight gain of 5 pounds or more per week.  Heartburn that doesn't go away.  Signs of bladder infection: pain when you urinate (use the toilet), need to go more often and more urgently.  The bag of godinez (rupture of membranes) breaks, or you notice leaking in your underwear.  Bright red blood in your underwear.  Abdominal (lower belly) or stomach pain.  For first baby: Contractions (tightening) less than 5 minutes apart for one hour or more.  Second (plus) baby: Contractions (tightening) less than 10 minutes apart and getting stronger.  *If less than 34 weeks: Contractions (tightening) more than 6 times in one hour.  Increase or change in vaginal discharge (note the color and amount)  Other:     Follow-up:  As scheduled in the clinic

## 2022-03-22 NOTE — PROGRESS NOTES
Lab results negative for rupture. Dr Rodriguez notified of results and  pt status. Orders for discharge received. Discharge instructions given in written and verbal form. Pt will call with any other concerns and will follow up at regular scheduled clinic appointment.

## 2022-03-22 NOTE — PROGRESS NOTES
Pt arrives to OB unit to rule out rupture. Reports feeling leaking off and on througout the night when coughing. No visible fluid noted on exam. ROM +  Obtained.

## 2022-03-23 ENCOUNTER — PRENATAL OFFICE VISIT (OUTPATIENT)
Dept: FAMILY MEDICINE | Facility: CLINIC | Age: 37
End: 2022-03-23
Payer: COMMERCIAL

## 2022-03-23 VITALS
WEIGHT: 171.9 LBS | SYSTOLIC BLOOD PRESSURE: 122 MMHG | BODY MASS INDEX: 31.44 KG/M2 | HEART RATE: 74 BPM | DIASTOLIC BLOOD PRESSURE: 68 MMHG

## 2022-03-23 DIAGNOSIS — Z34.80 PRENATAL CARE, SUBSEQUENT PREGNANCY, UNSPECIFIED TRIMESTER: Primary | ICD-10-CM

## 2022-03-23 DIAGNOSIS — J01.00 ACUTE NON-RECURRENT MAXILLARY SINUSITIS: ICD-10-CM

## 2022-03-23 DIAGNOSIS — R05.9 COUGH: ICD-10-CM

## 2022-03-23 PROCEDURE — 96372 THER/PROPH/DIAG INJ SC/IM: CPT | Performed by: FAMILY MEDICINE

## 2022-03-23 PROCEDURE — 99213 OFFICE O/P EST LOW 20 MIN: CPT | Mod: 25 | Performed by: FAMILY MEDICINE

## 2022-03-23 PROCEDURE — 99207 PR PRENATAL VISIT: CPT | Performed by: FAMILY MEDICINE

## 2022-03-23 RX ORDER — CODEINE PHOSPHATE AND GUAIFENESIN 10; 100 MG/5ML; MG/5ML
1-2 SOLUTION ORAL EVERY 6 HOURS PRN
Qty: 236 ML | Refills: 0 | Status: ON HOLD | OUTPATIENT
Start: 2022-03-23 | End: 2022-03-27

## 2022-03-23 RX ORDER — CEFUROXIME AXETIL 500 MG/1
500 TABLET ORAL 2 TIMES DAILY
Qty: 20 TABLET | Refills: 0 | Status: SHIPPED | OUTPATIENT
Start: 2022-03-23 | End: 2022-04-02

## 2022-03-23 RX ORDER — CEFTRIAXONE SODIUM 1 G
1 VIAL (EA) INJECTION ONCE
Status: COMPLETED | OUTPATIENT
Start: 2022-03-23 | End: 2022-03-23

## 2022-03-23 RX ORDER — CEFUROXIME AXETIL 500 MG/1
500 TABLET ORAL 2 TIMES DAILY
Qty: 20 TABLET | Refills: 0 | Status: SHIPPED | OUTPATIENT
Start: 2022-03-23 | End: 2022-03-23

## 2022-03-23 RX ADMIN — Medication 1 G: at 13:21

## 2022-03-23 NOTE — PROGRESS NOTES
Concerns: horrific cough and maxillary sinus pain on the left; unable to sleep; will do Rocephin and Codeine cough syrup  No HA, RUQ pain, N/V, visual changes.  Cervix is very low; soft.  Labor precautions discussed.  RTC 1 week.    Nanda Rodriguez MD

## 2022-03-26 ENCOUNTER — HOSPITAL ENCOUNTER (INPATIENT)
Facility: CLINIC | Age: 37
LOS: 2 days | Discharge: HOME | End: 2022-03-28
Attending: FAMILY MEDICINE | Admitting: FAMILY MEDICINE
Payer: COMMERCIAL

## 2022-03-26 PROBLEM — Z34.90 PREGNANT: Status: ACTIVE | Noted: 2022-03-26

## 2022-03-26 LAB
ABO/RH(D): NORMAL
ANTIBODY SCREEN: NEGATIVE
HGB BLD-MCNC: 12.5 G/DL (ref 11.7–15.7)
SPECIMEN EXPIRATION DATE: NORMAL

## 2022-03-26 PROCEDURE — 86850 RBC ANTIBODY SCREEN: CPT | Performed by: FAMILY MEDICINE

## 2022-03-26 PROCEDURE — 85018 HEMOGLOBIN: CPT | Performed by: FAMILY MEDICINE

## 2022-03-26 PROCEDURE — 86780 TREPONEMA PALLIDUM: CPT | Performed by: FAMILY MEDICINE

## 2022-03-26 PROCEDURE — 59400 OBSTETRICAL CARE: CPT | Performed by: FAMILY MEDICINE

## 2022-03-26 PROCEDURE — 258N000003 HC RX IP 258 OP 636: Performed by: FAMILY MEDICINE

## 2022-03-26 PROCEDURE — 250N000011 HC RX IP 250 OP 636: Performed by: FAMILY MEDICINE

## 2022-03-26 PROCEDURE — 250N000013 HC RX MED GY IP 250 OP 250 PS 637: Performed by: FAMILY MEDICINE

## 2022-03-26 PROCEDURE — 722N000001 HC LABOR CARE VAGINAL DELIVERY SINGLE

## 2022-03-26 PROCEDURE — 120N000001 HC R&B MED SURG/OB

## 2022-03-26 RX ORDER — CITRIC ACID/SODIUM CITRATE 334-500MG
30 SOLUTION, ORAL ORAL
Status: DISCONTINUED | OUTPATIENT
Start: 2022-03-26 | End: 2022-03-26 | Stop reason: HOSPADM

## 2022-03-26 RX ORDER — CARBOPROST TROMETHAMINE 250 UG/ML
250 INJECTION, SOLUTION INTRAMUSCULAR
Status: DISCONTINUED | OUTPATIENT
Start: 2022-03-26 | End: 2022-03-26 | Stop reason: HOSPADM

## 2022-03-26 RX ORDER — IBUPROFEN 800 MG/1
800 TABLET, FILM COATED ORAL EVERY 6 HOURS PRN
Status: DISCONTINUED | OUTPATIENT
Start: 2022-03-27 | End: 2022-03-28 | Stop reason: HOSPADM

## 2022-03-26 RX ORDER — METOCLOPRAMIDE 10 MG/1
10 TABLET ORAL EVERY 6 HOURS PRN
Status: DISCONTINUED | OUTPATIENT
Start: 2022-03-26 | End: 2022-03-26 | Stop reason: HOSPADM

## 2022-03-26 RX ORDER — PROCHLORPERAZINE MALEATE 10 MG
10 TABLET ORAL EVERY 6 HOURS PRN
Status: DISCONTINUED | OUTPATIENT
Start: 2022-03-26 | End: 2022-03-26 | Stop reason: HOSPADM

## 2022-03-26 RX ORDER — OXYTOCIN 10 [USP'U]/ML
10 INJECTION, SOLUTION INTRAMUSCULAR; INTRAVENOUS
Status: DISCONTINUED | OUTPATIENT
Start: 2022-03-26 | End: 2022-03-28 | Stop reason: HOSPADM

## 2022-03-26 RX ORDER — MISOPROSTOL 200 UG/1
800 TABLET ORAL
Status: DISCONTINUED | OUTPATIENT
Start: 2022-03-26 | End: 2022-03-26 | Stop reason: HOSPADM

## 2022-03-26 RX ORDER — DOCUSATE SODIUM 100 MG/1
100 CAPSULE, LIQUID FILLED ORAL DAILY
Status: DISCONTINUED | OUTPATIENT
Start: 2022-03-26 | End: 2022-03-28 | Stop reason: HOSPADM

## 2022-03-26 RX ORDER — OXYTOCIN/0.9 % SODIUM CHLORIDE 30/500 ML
100-340 PLASTIC BAG, INJECTION (ML) INTRAVENOUS CONTINUOUS PRN
Status: DISCONTINUED | OUTPATIENT
Start: 2022-03-26 | End: 2022-03-28 | Stop reason: HOSPADM

## 2022-03-26 RX ORDER — KETOROLAC TROMETHAMINE 30 MG/ML
30 INJECTION, SOLUTION INTRAMUSCULAR; INTRAVENOUS
Status: DISCONTINUED | OUTPATIENT
Start: 2022-03-26 | End: 2022-03-26

## 2022-03-26 RX ORDER — IBUPROFEN 800 MG/1
800 TABLET, FILM COATED ORAL
Status: DISCONTINUED | OUTPATIENT
Start: 2022-03-26 | End: 2022-03-26

## 2022-03-26 RX ORDER — MODIFIED LANOLIN
OINTMENT (GRAM) TOPICAL
Status: DISCONTINUED | OUTPATIENT
Start: 2022-03-26 | End: 2022-03-28 | Stop reason: HOSPADM

## 2022-03-26 RX ORDER — PROCHLORPERAZINE 25 MG
25 SUPPOSITORY, RECTAL RECTAL EVERY 12 HOURS PRN
Status: DISCONTINUED | OUTPATIENT
Start: 2022-03-26 | End: 2022-03-26 | Stop reason: HOSPADM

## 2022-03-26 RX ORDER — FENTANYL CITRATE 50 UG/ML
100 INJECTION, SOLUTION INTRAMUSCULAR; INTRAVENOUS
Status: DISCONTINUED | OUTPATIENT
Start: 2022-03-26 | End: 2022-03-26 | Stop reason: HOSPADM

## 2022-03-26 RX ORDER — MISOPROSTOL 200 UG/1
400 TABLET ORAL
Status: DISCONTINUED | OUTPATIENT
Start: 2022-03-26 | End: 2022-03-26 | Stop reason: HOSPADM

## 2022-03-26 RX ORDER — NALOXONE HYDROCHLORIDE 0.4 MG/ML
0.2 INJECTION, SOLUTION INTRAMUSCULAR; INTRAVENOUS; SUBCUTANEOUS
Status: DISCONTINUED | OUTPATIENT
Start: 2022-03-26 | End: 2022-03-26 | Stop reason: HOSPADM

## 2022-03-26 RX ORDER — NALOXONE HYDROCHLORIDE 0.4 MG/ML
0.4 INJECTION, SOLUTION INTRAMUSCULAR; INTRAVENOUS; SUBCUTANEOUS
Status: DISCONTINUED | OUTPATIENT
Start: 2022-03-26 | End: 2022-03-28 | Stop reason: HOSPADM

## 2022-03-26 RX ORDER — METHYLERGONOVINE MALEATE 0.2 MG/ML
200 INJECTION INTRAVENOUS
Status: DISCONTINUED | OUTPATIENT
Start: 2022-03-26 | End: 2022-03-26 | Stop reason: HOSPADM

## 2022-03-26 RX ORDER — OXYCODONE HYDROCHLORIDE 5 MG/1
5 TABLET ORAL EVERY 4 HOURS PRN
Status: DISCONTINUED | OUTPATIENT
Start: 2022-03-26 | End: 2022-03-28 | Stop reason: HOSPADM

## 2022-03-26 RX ORDER — BISACODYL 10 MG
10 SUPPOSITORY, RECTAL RECTAL DAILY PRN
Status: DISCONTINUED | OUTPATIENT
Start: 2022-03-26 | End: 2022-03-28 | Stop reason: HOSPADM

## 2022-03-26 RX ORDER — ONDANSETRON 4 MG/1
4 TABLET, ORALLY DISINTEGRATING ORAL EVERY 6 HOURS PRN
Status: DISCONTINUED | OUTPATIENT
Start: 2022-03-26 | End: 2022-03-26 | Stop reason: HOSPADM

## 2022-03-26 RX ORDER — OXYTOCIN 10 [USP'U]/ML
10 INJECTION, SOLUTION INTRAMUSCULAR; INTRAVENOUS
Status: DISCONTINUED | OUTPATIENT
Start: 2022-03-26 | End: 2022-03-26 | Stop reason: HOSPADM

## 2022-03-26 RX ORDER — HYDROCORTISONE 2.5 %
CREAM (GRAM) TOPICAL 3 TIMES DAILY PRN
Status: DISCONTINUED | OUTPATIENT
Start: 2022-03-26 | End: 2022-03-28 | Stop reason: HOSPADM

## 2022-03-26 RX ORDER — SODIUM CHLORIDE, SODIUM LACTATE, POTASSIUM CHLORIDE, CALCIUM CHLORIDE 600; 310; 30; 20 MG/100ML; MG/100ML; MG/100ML; MG/100ML
INJECTION, SOLUTION INTRAVENOUS CONTINUOUS
Status: DISCONTINUED | OUTPATIENT
Start: 2022-03-26 | End: 2022-03-26 | Stop reason: HOSPADM

## 2022-03-26 RX ORDER — NALOXONE HYDROCHLORIDE 0.4 MG/ML
0.4 INJECTION, SOLUTION INTRAMUSCULAR; INTRAVENOUS; SUBCUTANEOUS
Status: DISCONTINUED | OUTPATIENT
Start: 2022-03-26 | End: 2022-03-26 | Stop reason: HOSPADM

## 2022-03-26 RX ORDER — NALOXONE HYDROCHLORIDE 0.4 MG/ML
0.2 INJECTION, SOLUTION INTRAMUSCULAR; INTRAVENOUS; SUBCUTANEOUS
Status: DISCONTINUED | OUTPATIENT
Start: 2022-03-26 | End: 2022-03-28 | Stop reason: HOSPADM

## 2022-03-26 RX ORDER — OXYTOCIN/0.9 % SODIUM CHLORIDE 30/500 ML
340 PLASTIC BAG, INJECTION (ML) INTRAVENOUS CONTINUOUS PRN
Status: DISCONTINUED | OUTPATIENT
Start: 2022-03-26 | End: 2022-03-26 | Stop reason: HOSPADM

## 2022-03-26 RX ORDER — ONDANSETRON 2 MG/ML
4 INJECTION INTRAMUSCULAR; INTRAVENOUS EVERY 6 HOURS PRN
Status: DISCONTINUED | OUTPATIENT
Start: 2022-03-26 | End: 2022-03-26 | Stop reason: HOSPADM

## 2022-03-26 RX ORDER — ACETAMINOPHEN 325 MG/1
650 TABLET ORAL EVERY 4 HOURS PRN
Status: DISCONTINUED | OUTPATIENT
Start: 2022-03-26 | End: 2022-03-28 | Stop reason: HOSPADM

## 2022-03-26 RX ORDER — LIDOCAINE 40 MG/G
CREAM TOPICAL
Status: DISCONTINUED | OUTPATIENT
Start: 2022-03-26 | End: 2022-03-26 | Stop reason: HOSPADM

## 2022-03-26 RX ORDER — METOCLOPRAMIDE HYDROCHLORIDE 5 MG/ML
10 INJECTION INTRAMUSCULAR; INTRAVENOUS EVERY 6 HOURS PRN
Status: DISCONTINUED | OUTPATIENT
Start: 2022-03-26 | End: 2022-03-26 | Stop reason: HOSPADM

## 2022-03-26 RX ADMIN — MISOPROSTOL 800 MCG: 200 TABLET ORAL at 18:40

## 2022-03-26 RX ADMIN — SODIUM CHLORIDE, POTASSIUM CHLORIDE, SODIUM LACTATE AND CALCIUM CHLORIDE 500 ML: 600; 310; 30; 20 INJECTION, SOLUTION INTRAVENOUS at 17:53

## 2022-03-26 RX ADMIN — KETOROLAC TROMETHAMINE 30 MG: 30 INJECTION, SOLUTION INTRAMUSCULAR; INTRAVENOUS at 18:53

## 2022-03-26 ASSESSMENT — ACTIVITIES OF DAILY LIVING (ADL)
DIFFICULTY_EATING/SWALLOWING: NO
WALKING_OR_CLIMBING_STAIRS_DIFFICULTY: NO
FALL_HISTORY_WITHIN_LAST_SIX_MONTHS: NO
DOING_ERRANDS_INDEPENDENTLY_DIFFICULTY: NO
CHANGE_IN_FUNCTIONAL_STATUS_SINCE_ONSET_OF_CURRENT_ILLNESS/INJURY: NO
DRESSING/BATHING_DIFFICULTY: NO
CONCENTRATING,_REMEMBERING_OR_MAKING_DECISIONS_DIFFICULTY: NO
WEAR_GLASSES_OR_BLIND: NO
TOILETING_ISSUES: NO

## 2022-03-27 LAB
HGB BLD-MCNC: 12.1 G/DL (ref 11.7–15.7)
T PALLIDUM AB SER QL: NONREACTIVE

## 2022-03-27 PROCEDURE — 36415 COLL VENOUS BLD VENIPUNCTURE: CPT | Performed by: FAMILY MEDICINE

## 2022-03-27 PROCEDURE — 85018 HEMOGLOBIN: CPT | Performed by: FAMILY MEDICINE

## 2022-03-27 PROCEDURE — 120N000001 HC R&B MED SURG/OB

## 2022-03-27 PROCEDURE — 99207 PR NO CHARGE LOS: CPT | Performed by: FAMILY MEDICINE

## 2022-03-27 RX ORDER — IBUPROFEN 800 MG/1
800 TABLET, FILM COATED ORAL EVERY 6 HOURS PRN
Qty: 30 TABLET | Refills: 1 | Status: SHIPPED | OUTPATIENT
Start: 2022-03-27 | End: 2022-04-14

## 2022-03-27 RX ORDER — MODIFIED LANOLIN
OINTMENT (GRAM) TOPICAL
Qty: 7 G | Refills: 1 | Status: SHIPPED | OUTPATIENT
Start: 2022-03-27 | End: 2022-05-13

## 2022-03-27 NOTE — L&D DELIVERY NOTE
Delivery Summary    Aspen Penn MRN# [id number]   Age: 37 year old YOB: 1985     ASSESSMENT & PLAN: spontaneous labor and delivery       Wendy Penn [[id number]]    Labor Events     labor?: No   steroids: None  Labor Type: Spontaneous  Predominate monitoring during 1st stage: continuous electronic fetal monitoring     Antibiotics received during labor?: No     Rupture identifier: Sac 1  Rupture date/time: 3/26/22 1710   Rupture type: Spontaneous rupture of membranes occuring during spontaneous labor or augmentation  Fluid color: Clear  Fluid odor: Normal     Augmentation: None  1:1 continuous labor support provided by?: RN, provider       Delivery/Placenta Date and Time    Delivery Date: 3/26/22 Delivery Time:  6:31 PM   Placenta Date/Time: 3/26/2022  6:35 PM  Oxytocin given at the time of delivery: after delivery of baby  Delivering clinician: Nanda Rodriguez MD   Other personnel present at delivery:  Provider Role   Parul Ruby RN Delivery Nurse   Mya Gardiner RN Registered Nurse         Vaginal Counts     Initial count performed by 2 team members:  Two Team Members   darleen rodriguez       Needles Suture Needles Sponges (RETIRED) Instruments   Initial counts 0 0 5    Added to count       Relief counts       Final counts 0 0 5          Placed during labor Accounted for at the end of labor   FSE NA NA   IUPC NA NA   Cervidil NA NA              Final count performed by 2 team members:  Two Team Members   darleen rodriguez      Final count correct?: Yes     Apgars    Living status: Living   1 Minute 5 Minute 10 Minute 15 Minute 20 Minute   Skin color: 0  1       Heart rate: 2  2       Reflex irritability: 2  2       Muscle tone: 2  2       Respiratory effort: 1  2       Total: 7  9       Apgars assigned by: MYA GARDINER RN     Cord    Vessels: 3 Vessels    Cord Complications: None               Cord Blood Disposition: Lab    Gases Sent?: No    Delayed cord  clamping?: Yes    Cord Clamping Delay (seconds):  seconds    Stem cell collection?: No        Resuscitation    Methods: Suctioning     Labor Events and Shoulder Dystocia    Fetal Tracing Prior to Delivery: Category 1  Shoulder dystocia present?: Neg     Delivery (Maternal) (Provider to Complete) (620720)       Blood Loss  Mother: Nishi Penn #[id number]   Start of Mother's Information    Delivery Blood Loss  22 0631 - 22 1907    None           End of Mother's Information  Mother: Nishi Penn #[id number]          Delivery - Provider to Complete ([id number])    Delivering clinician: Nanda Rodriguez MD  Attempted Delivery Types (Choose all that apply): Spontaneous Vaginal Delivery                   Other personnel:  Provider Role   Parul Ruby, XOCHITL Delivery Nurse   Mya Sierra RN Registered Nurse                Placenta    Date/Time: 3/26/2022  6:35 PM  Removal: Spontaneous  Disposition: Hospital disposal           Anesthesia    Method: Nitrous Oxide                Presentation and Position    Presentation: Vertex    Position: Left Occiput Anterior                 Nanda Rodriguez MD

## 2022-03-27 NOTE — PLAN OF CARE
Problem: Adjustment to Role Transition (Postpartum Vaginal Delivery)  Goal: Successful Maternal Role Transition  Outcome: Ongoing, Progressing     Problem: Infection (Postpartum Vaginal Delivery)  Goal: Absence of Infection Signs/Symptoms  Outcome: Ongoing, Progressing     Problem: Bleeding (Postpartum Vaginal Delivery)  Goal: Hemostasis  Outcome: Ongoing, Progressing     Problem: Pain (Postpartum Vaginal Delivery)  Goal: Acceptable Pain Control  Outcome: Ongoing, Progressing     Problem: Urinary Retention (Postpartum Vaginal Delivery)  Goal: Effective Urinary Elimination  Outcome: Ongoing, Progressing

## 2022-03-27 NOTE — PROGRESS NOTES
RN at bedside for 15 minutes following Nitrous Oxide 50/50 inhalation initiation. Patient is observed using the equipment appropriately. Patient appears to be coping with labor. Patient is free of side effects.    Parul Ruby RN

## 2022-03-27 NOTE — L&D DELIVERY NOTE
[facility]    History and Physical  Obstetrics and Gynecology     Date of Admission:  3/26/2022    Assessment & Plan   Aspen Penn is a 37 year old female who presents with spontaneous labor  ASSESSMENT:   IUP @ 39w0d in active labor.  NST reactive.  Category  I    PLAN:   Admit - see IP orders  Anticipate     Nanda Coxy    History of Present Illness   Aspen Penn is a 37 year old female  39w0d  Estimated Date of Delivery: 22 is calculated from Patient's last menstrual period was 2021. [facility]  in [facility].    PRENATAL COURSE  Prenatal course was essentially uncomplicated  Baby was found to have a right club foot on prenatal level II ultrasound due to AMA.b   Recent sinus infection treated with Rocephin and Ceftin starting     Recent Labs   Lab Test 22  1806   AS Negative     Rhogam not indicated   Recent Labs   Lab Test 09/15/21  1548   HEPBANG Nonreactive   HIAGAB Negative   RUQIGG Positive       Past Medical History    I have reviewed this patient's medical history and updated it with pertinent information if needed.   Past Medical History:   Diagnosis Date     Abnormal Pap smear of cervix      Depressive disorder      Encounter for IUD insertion 2019     HPV (human papilloma virus) infection      Insomnia       (normal spontaneous vaginal delivery) 3/8/2019     Vasospasm (H) 2021       Past Surgical History   I have reviewed this patient's surgical history and updated it with pertinent information if needed.  Past Surgical History:   Procedure Laterality Date     LASIK Bilateral      WISDOM TOOTH EXTRACTION         Prior to Admission Medications   Prior to Admission Medications   Prescriptions Last Dose Informant Patient Reported? Taking?   cefuroxime (CEFTIN) 500 MG tablet   No No   Sig: Take 1 tablet (500 mg) by mouth 2 times daily for 10 days   desvenlafaxine (PRISTIQ) 50 MG  24 hr tablet   Yes No   Sig: Take 50 mg by mouth daily   guaiFENesin-codeine (ROBITUSSIN AC) 100-10 MG/5ML solution   No No   Sig: Take 5-10 mLs by mouth every 6 hours as needed for cough   triamcinolone (NASACORT) 55 MCG/ACT nasal aerosol   No No   Sig: Spray 2 sprays into both nostrils daily   valACYclovir (VALTREX) 1000 MG tablet   No No   Sig: [VALACYCLOVIR (VALTREX) 1000 MG TABLET] TAKE TWO TABLETS BY MOUTH TWICE DAILY FOR 2 DAYS   zolpidem (AMBIEN) 5 MG tablet   No No   Sig: [ZOLPIDEM (AMBIEN) 5 MG TABLET] 1/2-1 tablet po at HS prn sleep      Facility-Administered Medications: None     Allergies   No Known Allergies    Social History   I have reviewed this patient's social history and updated it with pertinent information if needed. Aspen Penn  reports that she has never smoked. She has never used smokeless tobacco. She reports previous alcohol use of about 4.0 - 5.0 standard drinks of alcohol per week. She reports that she does not use drugs.    Family History   I have reviewed this patient's family history and updated it with pertinent information if needed.   Family History   Problem Relation Age of Onset     Hypothyroidism Mother      Thyroid Disease Mother         Hypothyroid     No Known Problems Father      No Known Problems Sister      No Known Problems Brother      Endometrial Cancer Maternal Grandmother      Hypothyroidism Maternal Grandmother      Coronary Artery Disease Maternal Grandmother      Other Cancer Maternal Grandmother         Endometrial     Thyroid Disease Maternal Grandmother         Hypothyroid     Heart Disease Maternal Grandfather      Other Cancer Maternal Grandfather         Follicular lymphoma     Cancer Paternal Grandmother        Immunization History   Immunizations are up to date    Physical Exam   Temp: 98.4  F (36.9  C) Temp src: Oral                Vital Signs with Ranges  Temp:  [98.4  F (36.9  C)] 98.4  F (36.9  C)    Abdomen: gravid, single vertex fetus,  non-tender, EFW 7 lbs 0  Cervical Exam: 4.5/ 90/ Anterior/ soft/ +1     Fetal Heart Tones: 120's baseline, moderate variablility, + accels, no decels and Category I  TOCO:   frequency q 2-3 minutes    Constitutional: healthy, alert, active and mild distress   Respiratory: No increased work of breathing, good air exchange, clear to auscultation bilaterally, no crackles or wheezing  Cardiovascular: Normal apical impulse, regular rate and rhythm, normal S1 and S2, no S3 or S4, and no murmur noted  Skin/Extremites: no bruising or bleeding  Neurologic: Awake, alert, oriented to name, place and time.  Cranial nerves II-XII are grossly intact.  Motor is 5 out of 5 bilaterally.  Cerebellar finger to nose, heel to shin intact.  Sensory is intact.  Babinski down going, Romberg negative, and gait is normal.  Neuropsychiatric: normal in active labor using Nitrous

## 2022-03-27 NOTE — DISCHARGE SUMMARY
[facility] Discharge Summary    Aspen Penn MRN# [id number]   Age: 37 year old YOB: 1985     Date of Admission:  3/26/2022  Date of Discharge::  3/27/2022  Admitting Physician:  Nanda Rodriguez MD  Discharge Physician:  Nanda Rodriguez MD     Home clinic: [facility]          Admission Diagnoses:   Pregnant [Z34.90]  Encounter for triage in pregnant patient [Z36.89]          Discharge Diagnosis:   Normal spontaneous vaginal delivery  Intrauterine pregnancy at 39 weeks gestation          Procedures:   Procedure(s): No additional procedures performed       No procedures performed during this admission           Medications Prior to Admission:     Medications Prior to Admission   Medication Sig Dispense Refill Last Dose     cefuroxime (CEFTIN) 500 MG tablet Take 1 tablet (500 mg) by mouth 2 times daily for 10 days 20 tablet 0      desvenlafaxine (PRISTIQ) 50 MG 24 hr tablet Take 50 mg by mouth daily        triamcinolone (NASACORT) 55 MCG/ACT nasal aerosol Spray 2 sprays into both nostrils daily 10.8 mL 1      valACYclovir (VALTREX) 1000 MG tablet [VALACYCLOVIR (VALTREX) 1000 MG TABLET] TAKE TWO TABLETS BY MOUTH TWICE DAILY FOR 2 DAYS 8 tablet 11      [DISCONTINUED] guaiFENesin-codeine (ROBITUSSIN AC) 100-10 MG/5ML solution Take 5-10 mLs by mouth every 6 hours as needed for cough 236 mL 0      [DISCONTINUED] zolpidem (AMBIEN) 5 MG tablet [ZOLPIDEM (AMBIEN) 5 MG TABLET] 1/2-1 tablet po at HS prn sleep 30 tablet 0              Discharge Medications:     Current Facility-Administered Medications   Medication     acetaminophen (TYLENOL) tablet 650 mg     benzocaine-menthol (DERMOPLAST) topical spray     bisacodyl (DULCOLAX) Suppository 10 mg     docusate sodium (COLACE) capsule 100 mg     hydrocortisone 2.5 % cream     ibuprofen (ADVIL/MOTRIN) tablet 800 mg     lanolin cream     lidocaine 1 % 0.1-20 mL     naloxone (NARCAN) injection 0.2 mg    Or     naloxone (NARCAN)  injection 0.4 mg    Or     naloxone (NARCAN) injection 0.2 mg    Or     naloxone (NARCAN) injection 0.4 mg     No MMR Needed - Assessment: Patient does not need MMR vaccine     No Tdap Needed - Assessment: Patient does not need Tdap vaccine     oxyCODONE (ROXICODONE) tablet 5 mg     oxytocin (PITOCIN) 30 units in 500 mL 0.9% NaCl infusion     oxytocin (PITOCIN) injection 10 Units     witch hazel-glycerin (TUCKS) pad             Consultations:   Consultation during this admission received from neonatology/ NNP          Brief History of Labor:   Prodromal at home. On admission, 4-5 cm, SROM, almost precipitous delivery           Hospital Course:   The patient's hospital course was unremarkable.  On discharge, her pain was well controlled.  Vaginal bleeding is similar to peak menstrual flow.  Voiding without difficulty.  Ambulating well and tolerating a normal diet.  No fever.  Breastfeedingchallenging.  Infant is stable. She was discharged on post-partum day #1.    Post-partum hemoglobin:   Hemoglobin   Date Value Ref Range Status   03/27/2022 12.1 11.7 - 15.7 g/dL Final             Discharge Instructions and Follow-Up:   Discharge diet: Regular   Discharge activity: Activity as tolerated   Discharge follow-up: Follow up with primary care provider in 6 weeks   Wound care: Drink plenty of fluids  Ice to area for comfort           Discharge Disposition:   Discharged to home      Attestation:   Nanda Rodriguez MD

## 2022-03-27 NOTE — PLAN OF CARE
Problem: Plan of Care - These are the overarching goals to be used throughout the patient stay.    Goal: Optimal Comfort and Wellbeing  Outcome: Ongoing, Progressing     Pt denies any pain. Bonding well with baby.    Falguni Paige RN

## 2022-03-27 NOTE — L&D DELIVERY NOTE
OB Vaginal Delivery Note    Aspen Penn MRN# [id number]   Age: 37 year old YOB: 1985       GA: 39w0d  GP:   Labor Complications: None   EBL:   mL  Delivery QBL:    Delivery Type:    ROM to Delivery Time: (Delivered) Hours: 1 Minutes: 21  [place] Weight:     1 Minute 5 Minute 10 Minute   Apgar Totals: 7   9        JESSICA TAPIA;JODY GARDINER     Delivery Details:  Aspen Penn, a 37 year old  female delivered a viable infant with apgars of 7  and 9 . Patient was fully dilated and pushing after   hours  21 minutes in active labor. Delivery was via   to a sterile field under nitrous oxide  anesthesia. Infant delivered in vertex  left  occiput  anterior  position. Anterior and posterior shoulders delivered without difficulty. The cord was clamped, cut twice and 3 vessels  were noted. Cord blood was obtained in routine fashion with the following disposition: lab .      Cord complications: none   Placenta delivered at 3/26/2022  6:35 PM . Placental disposition was Hospital disposal . Fundal massage performed and fundus found to be firm.     Episiotomy:   none  Perineum, vagina, cervix were inspected, and the following lacerations were noted:   Perineal lacerations:  slight periurethral tears, no bleeding; no sutures               Any lacerations were repaired in the usual fashion using none.    Excellent hemostasis was noted.  Infant and patient in delivery room in good and stable condition.        Fili AguilarAspen [[id number]]    Labor Events     labor?: No   steroids: None  Labor Type: Spontaneous  Predominate monitoring during 1st stage: continuous electronic fetal monitoring     Antibiotics received during labor?: No     Rupture identifier: Sac 1  Rupture date/time: 3/26/22 1710   Rupture type: Spontaneous rupture of membranes occuring during spontaneous labor or augmentation  Fluid color: Clear  Fluid odor: Normal     Augmentation: None  1:1  continuous labor support provided by?: RN, provider       Delivery/Placenta Date and Time    Delivery Date: 3/26/22 Delivery Time:  6:31 PM   Placenta Date/Time: 3/26/2022  6:35 PM  Oxytocin given at the time of delivery: after delivery of baby  Delivering clinician: Nanda Rodriguez MD   Other personnel present at delivery:  Provider Role   Parul Ruby, XOCHITL Delivery Nurse   Mya Gardiner RN Registered Nurse         Vaginal Counts     Initial count performed by 2 team members:  Two Team Members   darleen rodriguez       Needles Suture Needles Sponges (RETIRED) Instruments   Initial counts 0 0 5    Added to count       Relief counts       Final counts 0 0 5          Placed during labor Accounted for at the end of labor   FSE NA NA   IUPC NA NA   Cervidil NA NA              Final count performed by 2 team members:  Two Team Members   darleen rodriguez      Final count correct?: Yes     Apgars    Living status: Living   1 Minute 5 Minute 10 Minute 15 Minute 20 Minute   Skin color: 0  1       Heart rate: 2  2       Reflex irritability: 2  2       Muscle tone: 2  2       Respiratory effort: 1  2       Total: 7  9       Apgars assigned by: MYA GARDINER RN     Cord    Vessels: 3 Vessels    Cord Complications: None               Cord Blood Disposition: Lab    Gases Sent?: No    Delayed cord clamping?: Yes    Cord Clamping Delay (seconds):  seconds    Stem cell collection?: No       [place] Resuscitation    Methods: Suctioning     Labor Events and Shoulder Dystocia    Fetal Tracing Prior to Delivery: Category 1  Shoulder dystocia present?: Neg     Delivery (Maternal) (Provider to Complete) (641999)       Blood Loss  Mother: Nishi Penn #[id number]   Start of Mother's Information    Delivery Blood Loss  22 0631 - 22 1908    None           End of Mother's Information  Mother: PennNishi #[id number]          Delivery - Provider to Complete ([id number])    Delivering clinician: Nanda Rodriguez  MD  Attempted Delivery Types (Choose all that apply): Spontaneous Vaginal Delivery                   Other personnel:  Provider Role   Parul Ruby, XOCHITL Delivery Nurse   Mya Sierra RN Registered Nurse                Placenta    Date/Time: 3/26/2022  6:35 PM  Removal: Spontaneous  Disposition: Hospital disposal           Anesthesia    Method: Nitrous Oxide                Presentation and Position    Presentation: Vertex    Position: Left Occiput Anterior                 Nanda Rodriguez MD

## 2022-03-28 VITALS
WEIGHT: 172 LBS | RESPIRATION RATE: 16 BRPM | SYSTOLIC BLOOD PRESSURE: 132 MMHG | OXYGEN SATURATION: 98 % | HEART RATE: 85 BPM | BODY MASS INDEX: 31.65 KG/M2 | DIASTOLIC BLOOD PRESSURE: 86 MMHG | HEIGHT: 62 IN | TEMPERATURE: 97.7 F

## 2022-03-28 PROCEDURE — 99207 PR SUBSEQUENT HOSPITAL CARE FOR MOTHER, 15 MINUTES: CPT | Performed by: FAMILY MEDICINE

## 2022-03-28 PROCEDURE — 250N000013 HC RX MED GY IP 250 OP 250 PS 637: Performed by: FAMILY MEDICINE

## 2022-03-28 RX ADMIN — DOCUSATE SODIUM 100 MG: 100 CAPSULE, LIQUID FILLED ORAL at 08:03

## 2022-03-28 RX ADMIN — IBUPROFEN 800 MG: 800 TABLET ORAL at 08:03

## 2022-03-28 NOTE — PLAN OF CARE
Problem: Plan of Care - These are the overarching goals to be used throughout the patient stay.    Goal: Optimal Comfort and Wellbeing  Outcome: Met     Problem: Pain (Postpartum Vaginal Delivery)  Goal: Acceptable Pain Control  Outcome: Met     VSS. Pt c/o cramping pain, finds relief from ibuprofen. Using breastpump to express milk. Pt ambulated to [facility] to visit baby, tolerated well. Pt to be discharged home per provider's orders.

## 2022-03-28 NOTE — CONSULTS
Integrative Therapy Consult    Healing PresenceYes  Essential Oils: Topical (EO/Topical Oil)     Karen -  HC, Lavender Massage Oil - HC       Healing Music:       Breathwork:       Guided Imagery:       Acupressure:       Oshibori:       Energy Therapy:       Healing Touch:       Reiki:       Qi Gong:     Massage: Hand, Foot      Targeted Massage:    Sleep Promotion:       Other Therapy:       Intervention Reason: Edema     Pre and Post Session Scores: Patient Desires Treatment: yes                             Delivery:         Referrals:      Regi Farah

## 2022-03-28 NOTE — DISCHARGE SUMMARY
[facility]    Discharge Summary  Obstetrics    Date of Admission:  3/26/2022  Date of Discharge:  3/28/2022  Discharging Provider: Malou Doyle MD    Discharge Diagnoses       History of Present Illness   Aspen Penn is a 37 year old female who presented with labor.  Labor course and delivery were uncomplicated.  Patient delivered vaginally without perineal tears requiring repair.  Vital signs have remained stable and lochia is mild.  Pain is well-controlled.    Hospital Course   The patient's hospital course was unremarkable.  She recovered as anticipated and experienced no post-delivery complications.  On discharge, her pain was well controlled. Vaginal bleeding is similar to peak menstrual flow.  Voiding without difficulty.  Ambulating well and tolerating a normal diet.  No fevers.  Breastfeeding reasonably well.  Infant is admitted to NICU this morning for some respiratory distress and rule out sepsis.  She was discharged on post-partum day #2.    Post-partum hemoglobin:   Hemoglobin   Date Value Ref Range Status   2022 12.1 11.7 - 15.7 g/dL Final       Malou Doyle MD    Discharge Disposition   Discharged to home   Condition at discharge: Stable    Primary Care Physician   Nanda Rodriguez    Consultations This Hospital Stay   CARE MANAGEMENT / SOCIAL WORK IP CONSULT  HOME CARE POST PARTUM/ IP CONSULT  LACTATION IP CONSULT    Discharge Orders      Lactation Referral      Activity    Activity as tolerated     Reason for your hospital stay    Maternity care     Follow Up    Follow up with provider in 6 weeks for post-delivery check     Postpartum Exercises for vaginal delivery    These exercises may be started soon after delivery. If you feel tired or uncomfortable, stop and try these exercises after resting. Check for any separation in your stomach wall before doing exercises that involve twisting or stress on your stomach muscles. To check this place your  fingers in the center of your upper abdomen and lift your head and shoulders up in a partial sit-up. If you feel a separation in your muscle wall, it is too soon to do sit ups.   1) Tighten and relax your pelvic floor muscles often.   2) Breathe deeply while laying on your back. As you breathe out, lift just your head up and pull the sides of your stomach toward the middle with your hands. Then breathe in as you put your head down. This will help to close the separation of your stomach.   3) Tilt your pelvis back and forth. Alternate this with full body stretches.   4) Move your feet back and forth, then in circular motions.   5) While lying on your back, slide your heels toward your hips and bend your knees one at a time, then together.   6) While lying flat on the floor, bend your knees and raise your legs one at a time.   7) When the stomach wall separation has closed, you can progress to straight curl-ups, diagonal curl-ups, and side leg lifts.     Mental Health Issues    Please call to check in with your provider at 2 weeks postpartum, or you can make an appointment to see your provider at 2 weeks postpartum.   Call if you are feeling sadness or depression that lasts longer than a week, or not enough energy to care for yourself or your baby at any time postpartum.   If you have any feeling of hurting yourself or feel like you may hurt someone CALL 911.     Breast pump - Manual/Electric    Breast Pump Documentation:  Manual/Electric Pump: To support adequate breast milk production and nutrition for infant.     I, the undersigned, certify that the above prescribed supplies are medically necessary for this patient and is both reasonable and necessary in reference to accepted standards of medical and necessary in reference to accepted standards of medical practice in the treatment of this patient's condition and is not prescribed as a convenience.     Diet    Resume previous diet     Discharge Medications   Current  Discharge Medication List      START taking these medications    Details   ibuprofen (ADVIL/MOTRIN) 800 MG tablet Take 1 tablet (800 mg) by mouth every 6 hours as needed for other (cramping)  Qty: 30 tablet, Refills: 1    Associated Diagnoses:  (normal spontaneous vaginal delivery)      lanolin ointment Apply topically every hour as needed for other (sore nipples)  Qty: 7 g, Refills: 1    Associated Diagnoses:  (normal spontaneous vaginal delivery)         CONTINUE these medications which have NOT CHANGED    Details   cefuroxime (CEFTIN) 500 MG tablet Take 1 tablet (500 mg) by mouth 2 times daily for 10 days  Qty: 20 tablet, Refills: 0    Associated Diagnoses: Acute non-recurrent maxillary sinusitis      desvenlafaxine (PRISTIQ) 50 MG 24 hr tablet Take 50 mg by mouth daily      triamcinolone (NASACORT) 55 MCG/ACT nasal aerosol Spray 2 sprays into both nostrils daily  Qty: 10.8 mL, Refills: 1    Associated Diagnoses: Congestion of paranasal sinus      valACYclovir (VALTREX) 1000 MG tablet [VALACYCLOVIR (VALTREX) 1000 MG TABLET] TAKE TWO TABLETS BY MOUTH TWICE DAILY FOR 2 DAYS  Qty: 8 tablet, Refills: 11    Associated Diagnoses: Herpes simplex virus infection         STOP taking these medications       guaiFENesin-codeine (ROBITUSSIN AC) 100-10 MG/5ML solution Comments:   Reason for Stopping:         zolpidem (AMBIEN) 5 MG tablet Comments:   Reason for Stopping:             Allergies   No Known Allergies

## 2022-03-28 NOTE — PLAN OF CARE
Problem: Pain (Postpartum Vaginal Delivery)  Goal: Acceptable Pain Control  Outcome: Ongoing, Progressing     Problem: Bleeding (Postpartum Vaginal Delivery)  Goal: Hemostasis  Outcome: Ongoing, Progressing     Pt vitals stable. Pt pain manageable, declines need for pain medication at this time.

## 2022-03-30 ENCOUNTER — LACTATION ENCOUNTER (OUTPATIENT)
Age: 37
End: 2022-03-30
Payer: COMMERCIAL

## 2022-03-30 NOTE — LACTATION NOTE
This note was copied from a baby's chart.  Follow up to assist with breast feeding and assess latch and transfer.  Baby is awake and eager to feed.  In football hold on the right breast, baby latched, took a few sucks and once milk started coming he pulled off and wouldn't latch again.  We tried cross cradle, nipple shield and switching to the left breast and baby not interested in latching.  With suck assessment on my gloved finger, baby has a poor suck and he holds his tongue back and doesn't extend it past his lower gumline.  He has been using the Kirill bottle with OT's guidance.  He's been taking his full volumes and had his NT removed.  He hold his mouth open with direct stimulation to the palette looking for nipple.  I gave mom a handout on suck training exercises to help strengthen his tongue movements and suction to be able to remove more milk.  I also discussed cranial sacral therapy which might be helpful with his suck.  She is aware of lactation resources in education folder and will schedule an appointment as needed.

## 2022-03-30 NOTE — LACTATION NOTE
This note was copied from a baby's chart.  Met with Aspen to discuss getting milk in her pump tubing.  She has the Medela Maxflow and was using the old pump parts from her medela pump in style when milk went up the tubing and into the pump.  She switched back to the other parts that came with pump and everything worked fine.  I encouraged her to call Medela and ask about milk getting into the pump from using the wrong parts and see if she can do anything about it.  The pump seems to be working properly.  She's getting 4 oz every time she pumps and has no questions about pumping.  David Peña is going to be discharged from NICU later today and she wants to work on breast feeding before discharging.  Nursery staff will call for baby's afternoon feeding.

## 2022-04-04 ENCOUNTER — LACTATION ENCOUNTER (OUTPATIENT)
Age: 37
End: 2022-04-04
Payer: COMMERCIAL

## 2022-04-04 NOTE — LACTATION NOTE
This note was copied from a baby's chart.  Referred to Aspen for a feeding update. At time of consult,Casey was at the L breast with a NS in place. Casey has an uncoordinated suck on the NS. Several techniques for sucking were demonstrated. Once Casey started getting sleepy at the breast, the Kirill bottle was introduced with breast milk. He was able to transfer from the bottle but not with a rhythmic suck. Aspen is pumping every 3 hours after feeding attempts. F/u sessions at the breast can be scheduled as needed.

## 2022-04-05 NOTE — ADDENDUM NOTE
Encounter addended by: Marilee Carrington, PT on: 4/4/2022 9:12 PM   Actions taken: Clinical Note Signed, Episode resolved

## 2022-04-05 NOTE — PROGRESS NOTES
[facility]    Outpatient Physical Therapy Discharge Note  Patient: Aspen Penn  : 1985    Beginning/End Dates of Reporting Period:  21 to 22    Referring Provider: Dr. Nanda Rodriguez    Therapy Diagnosis: pubic symphysis pain     Client Self Report: Noting dramatic improvement getting up from chair. Upper back is much better and not waking her up at night    Objective Measurements:          Goals:  Goal Identifier 1   Goal Description Patient will be able to transfer sit to stand from chair <2/10 pain at the end of her work shift in 8 weeks   Target Date 22   Date Met      Progress (detail required for progress note):       Goal Identifier 2   Goal Description Patient will re-initiate yoga in 4 weeks for movement management.   Target Date 22   Date Met      Progress (detail required for progress note):       Goal Identifier     Goal Description     Target Date     Date Met      Progress (detail required for progress note):       Goal Identifier     Goal Description     Target Date     Date Met      Progress (detail required for progress note):       Goal Identifier     Goal Description     Target Date     Date Met      Progress (detail required for progress note):       Goal Identifier     Goal Description     Target Date     Date Met      Progress (detail required for progress note):       Goal Identifier     Goal Description     Target Date     Date Met      Progress (detail required for progress note):       Goal Identifier     Goal Description     Target Date     Date Met      Progress (detail required for progress note):             Plan:  Discharge from therapy.    Discharge: outpatient PT to [facility]    Reason for Discharge: Patient has met all goals.      Discharge Plan: Patient to continue home program.

## 2022-04-13 NOTE — PROGRESS NOTES
"Assessment:   1.  2 1/2 week old infant gaining weight well on breastfeeding:  Already 8 oz over birthweight   2.  Fair latch but poor suck--required significant stimulation to continue nursing.  Might benefit from OT/speech referral  3. Fair milk transfer in office today, especially given poor suck:  Able to transfer 1.6 oz during observed feeding  4.  Mother with ample milk supply, likely enabling baby with poor suck to transfer milk  5.  Mother anticipating surgery next month    Plan:   1.  Offer Casey the breast when he cues to feed, at least every 3 hours during the day.  This will give him time to learn at the breast every day.  If he needs the nipple shield to latch, then use that--the 20 mm is a good fit for you.  While Casey is still learning to suck at the breast, you could consider working breastfeeding just during the day and just pumping and bottlefeeding during the night.    2.  Feed on one side until baby finishes swallowing.  Once swallowing slows, use breast compression to encourage more swallowing, but once there is no more active swallowing, and baby is either sleeping, coming off the breast, or just \"nibbling,\" it is OK to use a finger to take baby off the breast and move to the other breast.  Do the same on the other side.  Offer both breasts at each feeding.  It is more important to watch the baby than the clock!   3. Casey needs about 17 -18 oz of milk each day to grow well.  While he is getting stronger at breastfeeding, you could consider dividing the day into times for breast \"practice\" and times for strictly pumping and bottlefeeding.  If he nurses at home as he did in the office today, about 5 times/day (during the daytime), then he needs about 10 oz per day in supplementation.  You could give around 2.5 oz during the two nighttime feedings, and then supplement with an additional ounce after the daytime feedings, following his cues.  Use the milk fortifier as Dr. Rodriguez advised.    4. " Sometimes if babies have slow weight gain or poor breastfeeding in early days, they can become more sleepy and less productive at breast, which leads to lower intake, lower milk supply and further poor gain.  Helping baby gain well will often lead to more energetic nursing, more intake, and better supply.  4.  When pumping, consider using a size 24mm flange.  If you are still feeling some friction, try some olive oil or coconut oil inside the pump flange to reduce friction and make it more comfortable.    5.  Drop back very slowly on your pumping, so that your supply is more in line with [place]'s needs.  Instead of pumping until 6 oz come, stop after 5.  Then drop back by another ounce, and then another, until you can comfortably drop that pumping entirely.  You can drop back on each session this way, until you are just pumping the amount of extra that Casey needs in a day.  6.  For the plugged ducts, just do some gentle massage while nursing or pumping.  Massaging while in the shower, or using a little olive oil on your breast during pumping, is helpful for comfort and to keep you from massaging too vigorously.  Also try some heat on your breasts when pumping as well.  Additionally, try a lecithin supplement (soy or sunflower), 1200 mg three times/day, to help with plugged ducts.    7. Provided with list of safe medications for anesthesia/surgery.  Nurse baby right before surgery and as soon afterward as possible;  bring pump to the hospital to empty breasts regularly if not able to nurse.  8.  See pediatric provider as planned in 4 days, and lactation in one week.  "[employer] can be used for brief questions, but it's important to know that messages are not seen Friday through Sunday. If urgent help is needed, Monday through Friday you can call [phone] and one of our lactation consultants will get the message and respond; if you need a rapid response over a weekend or holiday, it is best to call your on-call  maternity or pediatric provider.  Please feel free to schedule a return visit if the concern is more detailed;  telephone visits are also an option if you don't feel you need to be seen in person.      Subjective: Nishi is here today because of concerns with baby Casey's suck and milk transfer, as well as uncomfortable milk oversupply. She reports that Casey is very sleepy at breast, seldom cues to feed, and has a weak suck at breast and bottle.  At pediatric visit yesterday Dr. Rodriguez found Casey to be having somewhat slow growth, and advised fortifying feeds, so Nishi has now been exclusively pumping in order to do this.  Nishi notes that Casey nurses very slowly, requires continuous stimulation to suckle, and does not have a strong suck.  She has tried nipple shield and this does not seem to make a big difference.  He has the same difficulties when bottlefeeding.  Additionally, Nishi has a very uncomfortable oversupply of milk, and a plugged duct on the right side.      Finally,Nishi will be having a hysterectomy next month and has some questions about managing upcoming surgery when breastfeeding.     Nishi is vaccinated for Covid-19 and has had a booster.    Hospital Course: Spontaneous labor and rapid, uncomplicated birth.  Infant with clubfoot, and to NICU on day 2 of life with respiratory distress and hypothermia;  Required nine-day stay for oxygen requirement.  Infant has pending genetic consult due to combination of clubfoot, chordee, and sacral dimple.  Seen by hospital IBCLC, who noted poor suck both at breast and bottle. Using nipple shield.    Mother's Relevant Med/Surg History: Depression on Pristiq.     Breast Surgery: none    Breastfeeding Goals: exclusive breastfeeding    Previous Breastfeeding Experience: Some brief latch difficulties with first baby;  Went on to breastfeed x 1 year    Infant's name: Casey  Infant's bday: [birth year]  Gestational age: 39 weeks  Infant's birth weight: 6 # 2.8 oz    Mode of  delivery: vaginal  Pediatric Provider: Dr. Nanda Rodriguez  Discharge weight: 6 # 5.1 oz on 4/6/22  Recent weight   4/8/22:  6 # 5 oz  4/13/22:  6 # 9 oz    Frequency and duration of feedings: every 2-4 hours  Swallows audible per mother: yes, when at breast  Numbers of feedings in 24 hours: 8 - 12  Number urines per day: 10  Number of stools per day and their color: 10, watery yellow    Supplementation: currently at bottle, taking average of 2-3 oz of fortified milk  Pumping: every four hours,  about 6-7 times/day, Yielding 3-6 oz/session--about 24-28 oz/day    Objective/Physical exam:   Mother: Noticed breasts grew larger and areolas darkened during pregnancy and she noticed primary engorgement when her milk came in on day 2-3  Her nipples are everted, the areola is compressible, the breast is soft and full. Right breast has firm, tender area covering entire upper outer quadrant.  No redness or swelling.  After nursing this was much improved:  Breast soft.    Sore nipples: tender  EPDS: 6.  Nishi reports feeling very stable on current dose of Pristiq;  Finds this very effective.    Assessment of infant: 2.26% Weight for age percentile   Age today: 2w5d  Today's weight: 6 # 11 oz    Amount of milk transferred from LEFT side: 0.4 oz  Amount of milk transferred from RIGHT side: 1.2 oz    Baby has full flexion of arms and legs, normal tone, behavior is alert and active, respirations are normal, skin is normal, hydration is normal, jaw is normal size and alignment, palate is normal, frenulum is normal.  Baby has minimal tongue movement: does not lateralize tongue well, has minimal tongue lift, and does not suck well.  Tongue can protrude past bottom gum line. Upper labial frenulum is normal.    Suck exam:  Baby had no suck on examining finger in office today.      Baby thrush: none   Jaundice: none     Feeding assessment: Baby was able to hold suction with tongue while at the breast, after several attempts.     Alignment:  "The baby was flex relaxed. Baby's head was aligned with its trunk. Baby did face mother. Baby was in football and cross-cradle positions today.     Areolar Grasp: Baby was able to open mouth wide. Baby's lips were not pursed. Baby's lips did flange outward. Tongue was visible over bottom gum. Baby had intermittent seal.     Areolar Compression: Baby made very intermittent rhythmic motion, when stimulated to suck.  He had infrequent spontaneous suck.  There were no clicking or smacking sounds. There was no severe nipple discomfort. Nipples appeared rounded after feeding.    Audible swallowing: Baby made quiet sounds of swallowing: There was an increase in frequency after milk ejection reflex. The milk ejection reflex is normal and milk supply is ample.     /62   Pulse 64   Ht 1.575 m (5' 2\")   LMP 2021   Breastfeeding Yes   BMI 31.46 kg/m    OB History    Para Term  AB Living   2 2 2 0 0 2   SAB IAB Ectopic Multiple Live Births   0 0 0 0 2      # Outcome Date GA Lbr Kj/2nd Weight Sex Delivery Anes PTL Lv   2 Term 22 39w0d / 00:02 2.8 kg (6 lb 2.8 oz) M  Nitrous N STEPHANIE      Name: MANASA MASCORRO      Apgar1: 7  Apgar5: 9   1 Term 19 38w6d  3.742 kg (8 lb 4 oz) M Vag-Spont EPI N STEPHANIE      Name: MANASA MASCORRO      Apgar1: 8  Apgar5: 9       Current Outpatient Medications:      desvenlafaxine (PRISTIQ) 50 MG 24 hr tablet, Take 50 mg by mouth daily, Disp: , Rfl:      lanolin ointment, Apply topically every hour as needed for other (sore nipples), Disp: 7 g, Rfl: 1     valACYclovir (VALTREX) 1000 MG tablet, [VALACYCLOVIR (VALTREX) 1000 MG TABLET] TAKE TWO TABLETS BY MOUTH TWICE DAILY FOR 2 DAYS, Disp: 8 tablet, Rfl: 11  Past Medical History:   Diagnosis Date     Abnormal Pap smear of cervix      Depressive disorder      Encounter for IUD insertion 2019     HPV (human papilloma virus) infection      Insomnia       (normal spontaneous vaginal delivery) " 3/8/2019     Vasospasm (H) 2021     Past Surgical History:   Procedure Laterality Date     LASIK Bilateral      WISDOM TOOTH EXTRACTION       Family History   Problem Relation Age of Onset     Hypothyroidism Mother      Thyroid Disease Mother         Hypothyroid     No Known Problems Father      No Known Problems Sister      No Known Problems Brother      Endometrial Cancer Maternal Grandmother      Hypothyroidism Maternal Grandmother      Coronary Artery Disease Maternal Grandmother      Other Cancer Maternal Grandmother         Endometrial     Thyroid Disease Maternal Grandmother         Hypothyroid     Heart Disease Maternal Grandfather      Other Cancer Maternal Grandfather         Follicular lymphoma     Cancer Paternal Grandmother        Time spent:  Chart review/prechartin min prior to day of service  Face-to-face visit:   75 min   Documentation:  20 min   Total time spent on day of service: 95 min  BASSEM Danielle, CNNARINDER, IBCLC

## 2022-04-14 ENCOUNTER — ALLIED HEALTH/NURSE VISIT (OUTPATIENT)
Dept: MIDWIFE SERVICES | Facility: CLINIC | Age: 37
End: 2022-04-14
Payer: COMMERCIAL

## 2022-04-14 VITALS
SYSTOLIC BLOOD PRESSURE: 110 MMHG | DIASTOLIC BLOOD PRESSURE: 62 MMHG | BODY MASS INDEX: 31.46 KG/M2 | HEIGHT: 62 IN | HEART RATE: 64 BPM

## 2022-04-14 DIAGNOSIS — O92.79 OTHER DISORDERS OF LACTATION: Primary | ICD-10-CM

## 2022-04-14 PROCEDURE — 99215 OFFICE O/P EST HI 40 MIN: CPT | Performed by: ADVANCED PRACTICE MIDWIFE

## 2022-04-14 PROCEDURE — 99417 PROLNG OP E/M EACH 15 MIN: CPT | Performed by: ADVANCED PRACTICE MIDWIFE

## 2022-04-14 NOTE — PATIENT INSTRUCTIONS
"1.  Offer Casey the breast when he cues to feed, at least every 3 hours during the day.  This will give him time to learn at the breast every day.  If he needs the nipple shield to latch, then use that--the 20 mm is a good fit for you.  While Casey is still learning to suck at the breast, you could consider working breastfeeding just during the day and just pumping and bottlefeeding during the night.    2.  Feed on one side until baby finishes swallowing.  Once swallowing slows, use breast compression to encourage more swallowing, but once there is no more active swallowing, and baby is either sleeping, coming off the breast, or just \"nibbling,\" it is OK to use a finger to take baby off the breast and move to the other breast.  Do the same on the other side.  Offer both breasts at each feeding.  It is more important to watch the baby than the clock!   3. Casey needs about 17 -18 oz of milk each day to grow well.  If he nurses at home as he did in the office today, about 5 times/day (during the daytime), then he needs about 10 oz per day in supplementation.  You could consider giving around 2.5 oz during the two nighttime feedings, and then about an ounce after the daytime feedings or following his cues.  Use the milk fortifier as Dr. Rodriguez advised.    4. Discussed that sometimes if babies have slow weight gain in early days, they can become more sleepy and less productive at breast, which leads to lower intake, lower milk supply and further poor gain.  Explained that helping baby catch up on gain will often lead to more energetic nursing, more intake, and better supply.  4.  When pumping, consider using a size 24mm flange.  If you are still feeling some friction, try some olive oil or coconut oil inside the pump flange to reduce friction and make it more comfortable.    5.  Drop back very slowly on your pumping, so that your supply is more in line with Casey's needs.  Instead of pumping until 6 oz come, stop after 5.  " "Then drop back by another ounce, and then another, until you can comfortably drop that pumping entirely.  You can drop back on each session this way, until you are just pumping the amount of extra that [place] needs in a day.  6.  For the plugged ducts, just do some gentle massage while nursing or pumping.  Massaging while in the shower, or using a little olive oil on your breast during pumping, is helpful for comfort and to keep you from massaging too vigorously.  Also try some heat on your breasts when pumping as well.  Additionally, try a lecithin supplement (soy or sunflower), 1200 mg three times/day, to help with plugged ducts.    7.  See pediatric provider as planned in 4 days, and lactation in one week.  [employer] can be used for brief questions, but it's important to know that messages are not seen Friday through Sunday. If urgent help is needed, Monday through Friday you can call [phone] and one of our lactation consultants will get the message and respond; if you need a rapid response over a weekend or holiday, it is best to call your on-call maternity or pediatric provider.  Please feel free to schedule a return visit if the concern is more detailed;  telephone visits are also an option if you don't feel you need to be seen in person.      ____________    Breastfeeding Your Small or Not-Quite-Ready-to-Be-Born Baby    Babies who are born a few weeks earlier than expected often have more struggles with breastfeeding for several reasons:  their muscles are not as fully developed, so they have a bit less strength and are more \"floppy;\" their cheeks have less fat in them, making it harder for them to maintain a strong suck;  they are sleepy and have less energy for breastfeeding.  All of these things make it difficult for these slightly-early babies to take in as much milk as they need.    Many babies will need supplementary bottles of pumped milk until they are strong enough to take in what they need on their " "own.  THIS IS EXHAUSTING.  Some ideas for making it easier:    Consider dividing the day into periods of time for nursing at the breast and times for bottlefeeding, so that each feeding is not a \"triple threat\" of feeding-supplementing-pumping.  Think about choosing daytime for nursing and nighttime for bottlefeeding.    During your planned nursing times:   --offer the breast as soon as baby shows signs of waking--don't wait until crying, or tire baby with diaper changes, etc.  Feed right away!    --It is important to follow your sleepy baby's cues--they tire easily.  Waking every 2-3 hours for feeding is often not helpful;  research has shown that feedings every 3-4 hours is enough for most early-born babies.  --If baby falls asleep while nursing, then try burping or changing diaper to help with waking.    --Feedings should not take longer than 30-40 minutes.  If you will be giving a bottle after nursing, spend about half of that time nursing, and save about half for the bottlefeeding so that baby does not become exhausted.  They need energy to finish their meal!  --You don't need to routinely offer a bottle or pump after every feeding during the \"nursing at the breast\" time.  Depending on how baby does at the breast, pump and offer a bottle every 4-6 hours.  --If you are using a nipple shield to help baby stay latched to the breast, this can often be weaned away a bit after the due date, once baby is doing well on the breast.    During planned bottlefeeding times:  --Use a team approach!  Mother should pump while partner feeds baby using a bottle.  Everyone is then awake at the same time, and everyone can return to sleep quickly.    --Be certain to pump at least 6 times/day, or up to 8 if baby is not removing milk well from the breast  --As baby matures and is doing better with the breast (check with lactation consultant for test weights), you can decrease bottlefeedings and move to using the breast at more " "feedings  --Baby will also become more awake as he or she grows, and instead of needing to be awakened at least every 4 hours, will wake on their own, eventually moving to a more \"grown up\" baby schedule of awakening sometimes every 2 hours to nurse.  This is normal and expected!      _____________    Common anesthesia/surgery/post-surgery medications to AVOID:  Meperidine (Demerol)    Medications that require close monitoring if used when breastfeeding:  Hydromorphone  Morphine    Medications considered safe for breastfeeding:  Midazolam  Fentanyl (single dose IV)  Ondansetron  Dexamethasone  Metoclopramide  Propofol  Etomidate  Volatile (inhaled) anesthetics  Succinylcholine  NMBAs  Neostigmine/glycopyrrolate  Lidocaine  Bupivicaine    Medications with no safety data in breastfeeding:  Ketamine      Reference:  Anesthesiology 10 2017, Vol 127, A15.  Doi:  10.1097/ALN      "

## 2022-04-24 DIAGNOSIS — Z11.59 ENCOUNTER FOR SCREENING FOR OTHER VIRAL DISEASES: Primary | ICD-10-CM

## 2022-05-13 ENCOUNTER — OFFICE VISIT (OUTPATIENT)
Dept: FAMILY MEDICINE | Facility: CLINIC | Age: 37
End: 2022-05-13
Payer: COMMERCIAL

## 2022-05-13 VITALS
HEART RATE: 74 BPM | SYSTOLIC BLOOD PRESSURE: 112 MMHG | HEIGHT: 62 IN | DIASTOLIC BLOOD PRESSURE: 64 MMHG | BODY MASS INDEX: 28.8 KG/M2 | WEIGHT: 156.5 LBS

## 2022-05-13 DIAGNOSIS — Z01.818 PREOP GENERAL PHYSICAL EXAM: Primary | ICD-10-CM

## 2022-05-13 DIAGNOSIS — N94.6 DYSMENORRHEA: ICD-10-CM

## 2022-05-13 DIAGNOSIS — G47.00 INSOMNIA, UNSPECIFIED TYPE: ICD-10-CM

## 2022-05-13 LAB
HCG UR QL: NEGATIVE
HGB BLD-MCNC: 12.9 G/DL (ref 11.7–15.7)

## 2022-05-13 PROCEDURE — 81025 URINE PREGNANCY TEST: CPT | Performed by: FAMILY MEDICINE

## 2022-05-13 PROCEDURE — 36415 COLL VENOUS BLD VENIPUNCTURE: CPT | Performed by: FAMILY MEDICINE

## 2022-05-13 PROCEDURE — 87624 HPV HI-RISK TYP POOLED RSLT: CPT | Performed by: FAMILY MEDICINE

## 2022-05-13 PROCEDURE — 85018 HEMOGLOBIN: CPT | Performed by: FAMILY MEDICINE

## 2022-05-13 PROCEDURE — 99214 OFFICE O/P EST MOD 30 MIN: CPT | Performed by: FAMILY MEDICINE

## 2022-05-13 PROCEDURE — G0123 SCREEN CERV/VAG THIN LAYER: HCPCS | Performed by: FAMILY MEDICINE

## 2022-05-13 RX ORDER — ZOLPIDEM TARTRATE 5 MG/1
5 TABLET ORAL
Qty: 25 TABLET | Refills: 0 | Status: SHIPPED | OUTPATIENT
Start: 2022-05-13 | End: 2022-06-08

## 2022-05-13 NOTE — PROGRESS NOTES
[facility]  [address] [address]  Phone: [phone]  Fax: [phone]  Primary Provider: Nanda Rodriguez    PREOPERATIVE EVALUATION:  Today's date: 5/13/2022    Aspen Penn is a 37 year old female who presents for a preoperative evaluation.    Surgical Information:  Surgery/Procedure: Transvaginal Hysterectomy  Surgery Location: [facility]  Surgeon: Dr Mckeon  Surgery Date: 5/25/22  Time of Surgery: 10:00 am  Where patient plans to recover: At home with family  Fax number for surgical facility:     Type of Anesthesia Anticipated: General    Assessment & Plan     The proposed surgical procedure is considered INTERMEDIATE risk.    Preop general physical exam       Dysmenorrhea       Routine postpartum follow-up                   Medication Instructions:  Patient is to take all scheduled medications on the day of surgery    RECOMMENDATION:  APPROVAL GIVEN to proceed with proposed procedure, without further diagnostic evaluation.          Subjective     HPI related to upcoming procedure: has had several abnormal paps, family history endometrial cancer, intolerance of any form of birth control    Preop Questions 5/9/2022   1. Have you ever had a heart attack or stroke? No   2. Have you ever had surgery on your heart or blood vessels, such as a stent placement, a coronary artery bypass, or surgery on an artery in your head, neck, heart, or legs? No   3. Do you have chest pain with activity? No   4. Do you have a history of  heart failure? No   5. Do you currently have a cold, bronchitis or symptoms of other infection? No   6. Do you have a cough, shortness of breath, or wheezing? No   7. Do you or anyone in your family have previous history of blood clots? No   8. Do you or does anyone in your family have a serious bleeding problem such as prolonged bleeding following surgeries or cuts? No   9. Have you ever had problems with anemia or been told to take  iron pills? No   10. Have you had any abnormal blood loss such as black, tarry or bloody stools, or abnormal vaginal bleeding? No   11. Have you ever had a blood transfusion? No   12. Are you willing to have a blood transfusion if it is medically needed before, during, or after your surgery? Yes   13. Have you or any of your relatives ever had problems with anesthesia? No   14. Do you have sleep apnea, excessive snoring or daytime drowsiness? No   15. Do you have any artifical heart valves or other implanted medical devices like a pacemaker, defibrillator, or continuous glucose monitor? No   16. Do you have artificial joints? No   17. Are you allergic to latex? No   18. Is there any chance that you may be pregnant? No       Health Care Directive:  Patient does not have a Health Care Directive or Living Will: Patient states has Advance Directive and will bring in a copy to clinic.    Preoperative Review of :   reviewed - controlled substances reflected in medication list.         Review of Systems  CONSTITUTIONAL: NEGATIVE for fever, chills, change in weight  INTEGUMENTARY/SKIN: NEGATIVE for worrisome rashes, moles or lesions  EYES: NEGATIVE for vision changes or irritation  ENT/MOUTH: hoarseness  RESP: NEGATIVE for significant cough or SOB  CV: NEGATIVE for chest pain, palpitations or peripheral edema  GI: NEGATIVE for nausea, abdominal pain, heartburn, or change in bowel habits  : NEGATIVE for frequency, dysuria, or hematuria  MUSCULOSKELETAL: NEGATIVE for significant arthralgias or myalgia  NEURO: NEGATIVE for weakness, dizziness or paresthesias  ENDOCRINE: NEGATIVE for temperature intolerance, skin/hair changes  HEME: NEGATIVE for bleeding problems  PSYCHIATRIC: NEGATIVE for changes in mood or affect    Patient Active Problem List    Diagnosis Date Noted     Pregnant 03/26/2022     Priority: Medium     Club foot of fetus affecting antepartum care of mother 02/23/2022     Priority: Medium     Prenatal  care, subsequent pregnancy, unspecified trimester 2022     Priority: Medium     Encounter for triage in pregnant patient 10/07/2021     Priority: Medium     Adjustment disorder with mixed anxiety and depressed mood 2021     Priority: Medium     Female stress incontinence 2018     Priority: Medium     ASCUS with positive high risk HPV cervical 2018     Priority: Medium     18 ASCUS, +HR HPV (not 16/18)  6/3/18 colpo visually normal, no biopsy  19 NIL pap, +HR HPV (not 16/18)  10/2/20 NIL pap, neg HPV. Plan: cotest in 3 years  21 NIL pap, +HR HPV (not 16/18). Plan: cotest in 1 year  21 Mychart results sent  21 Result letter sent        Past Medical History:   Diagnosis Date     Abnormal Pap smear of cervix      Depressive disorder      Encounter for IUD insertion 2019     HPV (human papilloma virus) infection      Insomnia       (normal spontaneous vaginal delivery) 3/8/2019     Vasospasm (H) 2021     Past Surgical History:   Procedure Laterality Date     LASIK Bilateral      WISDOM TOOTH EXTRACTION       Current Outpatient Medications   Medication Sig Dispense Refill     desvenlafaxine (PRISTIQ) 50 MG 24 hr tablet Take 50 mg by mouth daily       lanolin ointment Apply topically every hour as needed for other (sore nipples) 7 g 1     valACYclovir (VALTREX) 1000 MG tablet [VALACYCLOVIR (VALTREX) 1000 MG TABLET] TAKE TWO TABLETS BY MOUTH TWICE DAILY FOR 2 DAYS 8 tablet 11       No Known Allergies     Social History     Tobacco Use     Smoking status: Never Smoker     Smokeless tobacco: Never Used   Substance Use Topics     Alcohol use: Yes     Alcohol/week: 4.0 - 5.0 standard drinks     Family History   Problem Relation Age of Onset     Hypothyroidism Mother      Thyroid Disease Mother         Hypothyroid     No Known Problems Father      No Known Problems Sister      No Known Problems Brother      Endometrial Cancer Maternal Grandmother       Hypothyroidism Maternal Grandmother      Coronary Artery Disease Maternal Grandmother      Other Cancer Maternal Grandmother         Endometrial     Thyroid Disease Maternal Grandmother         Hypothyroid     Heart Disease Maternal Grandfather      Other Cancer Maternal Grandfather         Follicular lymphoma     Cancer Paternal Grandmother      History   Drug Use No         Objective     LMP 06/30/2021     Physical Exam    GENERAL APPEARANCE: healthy, alert and no distress     EYES: EOMI, PERRL     HENT: ear canals and TM's normal and nose and mouth without ulcers or lesions     NECK: no adenopathy, no asymmetry, masses, or scars and thyroid normal to palpation     RESP: lungs clear to auscultation - no rales, rhonchi or wheezes     CV: regular rates and rhythm, normal S1 S2, no S3 or S4 and no murmur, click or rub     ABDOMEN:  soft, nontender, no HSM or masses and bowel sounds normal     : normal cervix, adnexae, and uterus without masses or discharge and rectal exam normal without masses-guaiac negative stool     MS: extremities normal- no gross deformities noted, no evidence of inflammation in joints, FROM in all extremities.     SKIN: no suspicious lesions or rashes     NEURO: Normal strength and tone, sensory exam grossly normal, mentation intact and speech normal     PSYCH: mentation appears normal. and affect normal/bright     LYMPHATICS: No cervical adenopathy    Recent Labs   Lab Test 03/27/22  0715 03/26/22  1806 12/15/21  1105 09/15/21  1549 09/23/20  0840   HGB 12.1 12.5   < > 12.3  --    PLT  --   --   --  267  --    NA  --   --   --   --  139   POTASSIUM  --   --   --   --  4.4   CR  --   --   --   --  0.93    < > = values in this interval not displayed.        Diagnostics:  Labs pending at this time.  Results will be reviewed when available.   No EKG required, no history of coronary heart disease, significant arrhythmia, peripheral arterial disease or other structural heart disease.    Revised  Cardiac Risk Index (RCRI):  The patient has the following serious cardiovascular risks for perioperative complications:   - No serious cardiac risks = 0 points     RCRI Interpretation: 0 points: Class I (very low risk - 0.4% complication rate)           Signed Electronically by: Nanda Rodriguez MD  Copy of this evaluation report is provided to requesting physician.

## 2022-05-13 NOTE — PROGRESS NOTES
Aspen is here for a 6-week postpartum checkup.    She had a  of a viable boy, with no complications.  Since delivery, she has been breast feeding.  She has no signs of infection, bleeding or other complications.  She is not pregnant.  We discussed contraceptions and she has chosen hysterectomy.      EXAM:    HEENT: grossly normal.   PELVIC:    External genitalia: normal without lesion, repair well healed.                            Vagina: normal mucosa and rugae, no discharge.  Cervix: multiparous, well healed, without lesion.  Uterus: non pregnant in size, firm , mobile, no lesions.       EXTREMITIES: Warm to touch, good pulses, no ankle edema or calf tenderness.  NEUROLOGIC: grossly normal.    ASSESSMENT:   Normal 6-week postpartum exam after .    PLAN:  Pap smear done and hysterectomy for contraception.

## 2022-05-17 LAB
HUMAN PAPILLOMA VIRUS 16 DNA: NEGATIVE
HUMAN PAPILLOMA VIRUS 18 DNA: NEGATIVE
HUMAN PAPILLOMA VIRUS FINAL DIAGNOSIS: NORMAL
HUMAN PAPILLOMA VIRUS OTHER HR: NEGATIVE

## 2022-05-21 ENCOUNTER — LAB (OUTPATIENT)
Dept: LAB | Facility: CLINIC | Age: 37
End: 2022-05-21
Attending: OBSTETRICS & GYNECOLOGY
Payer: COMMERCIAL

## 2022-05-21 DIAGNOSIS — Z11.59 ENCOUNTER FOR SCREENING FOR OTHER VIRAL DISEASES: ICD-10-CM

## 2022-05-23 ENCOUNTER — VIRTUAL VISIT (OUTPATIENT)
Dept: FAMILY MEDICINE | Facility: CLINIC | Age: 37
End: 2022-05-23
Payer: COMMERCIAL

## 2022-05-23 DIAGNOSIS — Z02.89 ENCOUNTER FOR COMPLETION OF FORM WITH PATIENT: Primary | ICD-10-CM

## 2022-05-23 PROCEDURE — U0003 INFECTIOUS AGENT DETECTION BY NUCLEIC ACID (DNA OR RNA); SEVERE ACUTE RESPIRATORY SYNDROME CORONAVIRUS 2 (SARS-COV-2) (CORONAVIRUS DISEASE [COVID-19]), AMPLIFIED PROBE TECHNIQUE, MAKING USE OF HIGH THROUGHPUT TECHNOLOGIES AS DESCRIBED BY CMS-2020-01-R: HCPCS | Performed by: OBSTETRICS & GYNECOLOGY

## 2022-05-23 PROCEDURE — 99213 OFFICE O/P EST LOW 20 MIN: CPT | Mod: 95 | Performed by: FAMILY MEDICINE

## 2022-05-23 NOTE — PROGRESS NOTES
"Nishi is a 37 year old who is being evaluated via a billable video visit.      How would you like to obtain your AVS? MyChart  If the video visit is dropped, the invitation should be resent by: Send to e-mail at: [email or web address]  Will anyone else be joining your video visit? No     Video Start Time: 13:26    Assessment & Plan     Encounter for completion of form with patient  FMLA and workability    Postpartum care and examination     Postoperative anticipation of care after hysterectomy     6}     BMI:   Estimated body mass index is 28.62 kg/m  as calculated from the following:    Height as of 5/13/22: 1.575 m (5' 2\").    Weight as of 5/13/22: 71 kg (156 lb 8 oz).       No follow-ups on file.    Nanda Rodriguez MD  [facility]    Subjective   Nishi is a 37 year old who presents for the following health issues paperwork for disability    HPI   We needed to fill out a workability form and we need to fill out FMLA paperwork.  They were for period of time of 6 weeks postpartum for vaginal birth and for 6 weeks postoperatively for a hysterectomy.  The 2 separate time frames were listed on the paperwork.  There was an FMLA paperwork and workability form both of which were filled out and will be faxed to the disability company.  A copy of each will be scanned into the chart, and a copy of each will be sent to the patient.       Review of Systems          Objective           Vitals:  No vitals were obtained today due to virtual visit.    Physical Exam   GENERAL: Healthy, alert and no distress  EYES: Eyes grossly normal to inspection.  No discharge or erythema, or obvious scleral/conjunctival abnormalities.  RESP: No audible wheeze, cough, or visible cyanosis.  No visible retractions or increased work of breathing.    SKIN: Visible skin clear. No significant rash, abnormal pigmentation or lesions.  NEURO: Cranial nerves grossly intact.  Mentation and speech appropriate for " age.  PSYCH: Mentation appears normal, affect normal/bright, judgement and insight intact, normal speech and appearance well-groomed.     Video-Visit Details    Type of service:  Video Visit    Video End Time:13:38    Originating Location (pt. Location): Home    Distant Location (provider location):  [facility]     Platform used for Video Visit: [employer]

## 2022-05-24 PROBLEM — R87.610 ASCUS WITH POSITIVE HIGH RISK HPV CERVICAL: Status: ACTIVE | Noted: 2018-05-21

## 2022-05-24 PROBLEM — R87.810 ASCUS WITH POSITIVE HIGH RISK HPV CERVICAL: Status: ACTIVE | Noted: 2018-05-21

## 2022-05-24 LAB — SARS-COV-2 RNA RESP QL NAA+PROBE: NEGATIVE

## 2022-05-25 ENCOUNTER — ANESTHESIA EVENT (OUTPATIENT)
Dept: SURGERY | Facility: HOSPITAL | Age: 37
End: 2022-05-25
Payer: COMMERCIAL

## 2022-05-25 ENCOUNTER — HOSPITAL ENCOUNTER (OUTPATIENT)
Facility: HOSPITAL | Age: 37
Discharge: HOME | End: 2022-05-25
Attending: OBSTETRICS & GYNECOLOGY | Admitting: OBSTETRICS & GYNECOLOGY
Payer: COMMERCIAL

## 2022-05-25 ENCOUNTER — ANESTHESIA (OUTPATIENT)
Dept: SURGERY | Facility: HOSPITAL | Age: 37
End: 2022-05-25
Payer: COMMERCIAL

## 2022-05-25 VITALS
TEMPERATURE: 98 F | HEIGHT: 62 IN | WEIGHT: 152.44 LBS | HEART RATE: 67 BPM | BODY MASS INDEX: 28.05 KG/M2 | SYSTOLIC BLOOD PRESSURE: 105 MMHG | RESPIRATION RATE: 20 BRPM | DIASTOLIC BLOOD PRESSURE: 56 MMHG | OXYGEN SATURATION: 99 %

## 2022-05-25 DIAGNOSIS — N81.4 UTEROVAGINAL PROLAPSE: Primary | ICD-10-CM

## 2022-05-25 LAB
ABO/RH(D): NORMAL
ANTIBODY SCREEN: NEGATIVE
BASOPHILS # BLD AUTO: 0 10E3/UL (ref 0–0.2)
BASOPHILS NFR BLD AUTO: 0 %
EOSINOPHIL # BLD AUTO: 0.1 10E3/UL (ref 0–0.7)
EOSINOPHIL NFR BLD AUTO: 2 %
ERYTHROCYTE [DISTWIDTH] IN BLOOD BY AUTOMATED COUNT: 11.7 % (ref 10–15)
HCG UR QL: NEGATIVE
HCT VFR BLD AUTO: 38.7 % (ref 35–47)
HGB BLD-MCNC: 12.7 G/DL (ref 11.7–15.7)
IMM GRANULOCYTES # BLD: 0 10E3/UL
IMM GRANULOCYTES NFR BLD: 0 %
LYMPHOCYTES # BLD AUTO: 2.3 10E3/UL (ref 0.8–5.3)
LYMPHOCYTES NFR BLD AUTO: 41 %
MCH RBC QN AUTO: 30.9 PG (ref 26.5–33)
MCHC RBC AUTO-ENTMCNC: 32.8 G/DL (ref 31.5–36.5)
MCV RBC AUTO: 94 FL (ref 78–100)
MONOCYTES # BLD AUTO: 0.5 10E3/UL (ref 0–1.3)
MONOCYTES NFR BLD AUTO: 8 %
NEUTROPHILS # BLD AUTO: 2.8 10E3/UL (ref 1.6–8.3)
NEUTROPHILS NFR BLD AUTO: 49 %
NRBC # BLD AUTO: 0 10E3/UL
NRBC BLD AUTO-RTO: 0 /100
PLATELET # BLD AUTO: 255 10E3/UL (ref 150–450)
RBC # BLD AUTO: 4.11 10E6/UL (ref 3.8–5.2)
SPECIMEN EXPIRATION DATE: NORMAL
WBC # BLD AUTO: 5.7 10E3/UL (ref 4–11)

## 2022-05-25 PROCEDURE — 999N000141 HC STATISTIC PRE-PROCEDURE NURSING ASSESSMENT: Performed by: OBSTETRICS & GYNECOLOGY

## 2022-05-25 PROCEDURE — 250N000013 HC RX MED GY IP 250 OP 250 PS 637: Performed by: ANESTHESIOLOGY

## 2022-05-25 PROCEDURE — 250N000009 HC RX 250: Performed by: OBSTETRICS & GYNECOLOGY

## 2022-05-25 PROCEDURE — 250N000025 HC SEVOFLURANE, PER MIN: Performed by: OBSTETRICS & GYNECOLOGY

## 2022-05-25 PROCEDURE — 370N000017 HC ANESTHESIA TECHNICAL FEE, PER MIN: Performed by: OBSTETRICS & GYNECOLOGY

## 2022-05-25 PROCEDURE — 710N000009 HC RECOVERY PHASE 1, LEVEL 1, PER MIN: Performed by: OBSTETRICS & GYNECOLOGY

## 2022-05-25 PROCEDURE — 272N000001 HC OR GENERAL SUPPLY STERILE: Performed by: OBSTETRICS & GYNECOLOGY

## 2022-05-25 PROCEDURE — 258N000001 HC RX 258: Performed by: OBSTETRICS & GYNECOLOGY

## 2022-05-25 PROCEDURE — 86901 BLOOD TYPING SEROLOGIC RH(D): CPT | Performed by: PHYSICIAN ASSISTANT

## 2022-05-25 PROCEDURE — 85025 COMPLETE CBC W/AUTO DIFF WBC: CPT | Performed by: PHYSICIAN ASSISTANT

## 2022-05-25 PROCEDURE — 250N000011 HC RX IP 250 OP 636: Performed by: NURSE ANESTHETIST, CERTIFIED REGISTERED

## 2022-05-25 PROCEDURE — 81025 URINE PREGNANCY TEST: CPT | Performed by: PHYSICIAN ASSISTANT

## 2022-05-25 PROCEDURE — 360N000080 HC SURGERY LEVEL 7, PER MIN: Performed by: OBSTETRICS & GYNECOLOGY

## 2022-05-25 PROCEDURE — 86850 RBC ANTIBODY SCREEN: CPT | Performed by: PHYSICIAN ASSISTANT

## 2022-05-25 PROCEDURE — 710N000012 HC RECOVERY PHASE 2, PER MINUTE: Performed by: OBSTETRICS & GYNECOLOGY

## 2022-05-25 PROCEDURE — 250N000011 HC RX IP 250 OP 636: Performed by: ANESTHESIOLOGY

## 2022-05-25 PROCEDURE — 88305 TISSUE EXAM BY PATHOLOGIST: CPT | Mod: TC | Performed by: OBSTETRICS & GYNECOLOGY

## 2022-05-25 PROCEDURE — 250N000009 HC RX 250: Performed by: ANESTHESIOLOGY

## 2022-05-25 PROCEDURE — 258N000003 HC RX IP 258 OP 636: Performed by: ANESTHESIOLOGY

## 2022-05-25 PROCEDURE — 250N000011 HC RX IP 250 OP 636: Performed by: OBSTETRICS & GYNECOLOGY

## 2022-05-25 PROCEDURE — 250N000009 HC RX 250: Performed by: NURSE ANESTHETIST, CERTIFIED REGISTERED

## 2022-05-25 PROCEDURE — 36415 COLL VENOUS BLD VENIPUNCTURE: CPT | Performed by: PHYSICIAN ASSISTANT

## 2022-05-25 PROCEDURE — C1781 MESH (IMPLANTABLE): HCPCS | Performed by: OBSTETRICS & GYNECOLOGY

## 2022-05-25 PROCEDURE — 250N000013 HC RX MED GY IP 250 OP 250 PS 637: Performed by: PHYSICIAN ASSISTANT

## 2022-05-25 PROCEDURE — 250N000011 HC RX IP 250 OP 636: Performed by: PHYSICIAN ASSISTANT

## 2022-05-25 DEVICE — POLYPROPYLENE MESH FOR SACROCOLPOSUSPENSION/SACROCOLPOPEXY - Y CONTOUR™
Type: IMPLANTABLE DEVICE | Site: PELVIS | Status: FUNCTIONAL
Brand: RESTORELLE

## 2022-05-25 RX ORDER — KETOROLAC TROMETHAMINE 30 MG/ML
15 INJECTION, SOLUTION INTRAMUSCULAR; INTRAVENOUS ONCE
Status: COMPLETED | OUTPATIENT
Start: 2022-05-25 | End: 2022-05-25

## 2022-05-25 RX ORDER — PROPOFOL 10 MG/ML
INJECTION, EMULSION INTRAVENOUS PRN
Status: DISCONTINUED | OUTPATIENT
Start: 2022-05-25 | End: 2022-05-25

## 2022-05-25 RX ORDER — ONDANSETRON 4 MG/1
4 TABLET, ORALLY DISINTEGRATING ORAL EVERY 30 MIN PRN
Status: COMPLETED | OUTPATIENT
Start: 2022-05-25 | End: 2022-05-25

## 2022-05-25 RX ORDER — ACETAMINOPHEN 325 MG/1
975 TABLET ORAL ONCE
Status: COMPLETED | OUTPATIENT
Start: 2022-05-25 | End: 2022-05-25

## 2022-05-25 RX ORDER — CEFAZOLIN SODIUM/WATER 2 G/20 ML
2 SYRINGE (ML) INTRAVENOUS SEE ADMIN INSTRUCTIONS
Status: DISCONTINUED | OUTPATIENT
Start: 2022-05-25 | End: 2022-05-25 | Stop reason: HOSPADM

## 2022-05-25 RX ORDER — FENTANYL CITRATE 50 UG/ML
INJECTION, SOLUTION INTRAMUSCULAR; INTRAVENOUS PRN
Status: DISCONTINUED | OUTPATIENT
Start: 2022-05-25 | End: 2022-05-25

## 2022-05-25 RX ORDER — IBUPROFEN 200 MG
800 TABLET ORAL ONCE
Status: DISCONTINUED | OUTPATIENT
Start: 2022-05-25 | End: 2022-05-25 | Stop reason: HOSPADM

## 2022-05-25 RX ORDER — NEOSTIGMINE METHYLSULFATE 1 MG/ML
VIAL (ML) INJECTION PRN
Status: DISCONTINUED | OUTPATIENT
Start: 2022-05-25 | End: 2022-05-25

## 2022-05-25 RX ORDER — OXYCODONE HYDROCHLORIDE 5 MG/1
5 TABLET ORAL
Status: DISCONTINUED | OUTPATIENT
Start: 2022-05-25 | End: 2022-05-25 | Stop reason: HOSPADM

## 2022-05-25 RX ORDER — NALOXONE HYDROCHLORIDE 1 MG/ML
0.4 INJECTION INTRAMUSCULAR; INTRAVENOUS; SUBCUTANEOUS
Status: DISCONTINUED | OUTPATIENT
Start: 2022-05-25 | End: 2022-05-25 | Stop reason: HOSPADM

## 2022-05-25 RX ORDER — SODIUM CHLORIDE, SODIUM LACTATE, POTASSIUM CHLORIDE, CALCIUM CHLORIDE 600; 310; 30; 20 MG/100ML; MG/100ML; MG/100ML; MG/100ML
INJECTION, SOLUTION INTRAVENOUS CONTINUOUS
Status: DISCONTINUED | OUTPATIENT
Start: 2022-05-25 | End: 2022-05-25 | Stop reason: HOSPADM

## 2022-05-25 RX ORDER — PROPOFOL 10 MG/ML
INJECTION, EMULSION INTRAVENOUS CONTINUOUS PRN
Status: DISCONTINUED | OUTPATIENT
Start: 2022-05-25 | End: 2022-05-25

## 2022-05-25 RX ORDER — LIDOCAINE HYDROCHLORIDE 20 MG/ML
INJECTION, SOLUTION INFILTRATION; PERINEURAL PRN
Status: DISCONTINUED | OUTPATIENT
Start: 2022-05-25 | End: 2022-05-25

## 2022-05-25 RX ORDER — DEXAMETHASONE SODIUM PHOSPHATE 4 MG/ML
INJECTION, SOLUTION INTRA-ARTICULAR; INTRALESIONAL; INTRAMUSCULAR; INTRAVENOUS; SOFT TISSUE PRN
Status: DISCONTINUED | OUTPATIENT
Start: 2022-05-25 | End: 2022-05-25

## 2022-05-25 RX ORDER — NALOXONE HYDROCHLORIDE 1 MG/ML
0.2 INJECTION INTRAMUSCULAR; INTRAVENOUS; SUBCUTANEOUS
Status: DISCONTINUED | OUTPATIENT
Start: 2022-05-25 | End: 2022-05-25 | Stop reason: HOSPADM

## 2022-05-25 RX ORDER — LIDOCAINE 40 MG/G
CREAM TOPICAL
Status: DISCONTINUED | OUTPATIENT
Start: 2022-05-25 | End: 2022-05-25 | Stop reason: HOSPADM

## 2022-05-25 RX ORDER — HYDROMORPHONE HYDROCHLORIDE 1 MG/ML
0.2 INJECTION, SOLUTION INTRAMUSCULAR; INTRAVENOUS; SUBCUTANEOUS EVERY 5 MIN PRN
Status: DISCONTINUED | OUTPATIENT
Start: 2022-05-25 | End: 2022-05-25 | Stop reason: HOSPADM

## 2022-05-25 RX ORDER — OXYCODONE HYDROCHLORIDE 5 MG/1
5-10 TABLET ORAL EVERY 4 HOURS PRN
Qty: 16 TABLET | Refills: 0 | Status: SHIPPED | OUTPATIENT
Start: 2022-05-25 | End: 2022-06-08

## 2022-05-25 RX ORDER — SCOLOPAMINE TRANSDERMAL SYSTEM 1 MG/1
1 PATCH, EXTENDED RELEASE TRANSDERMAL ONCE
Status: DISCONTINUED | OUTPATIENT
Start: 2022-05-25 | End: 2022-05-25 | Stop reason: HOSPADM

## 2022-05-25 RX ORDER — ONDANSETRON 2 MG/ML
4 INJECTION INTRAMUSCULAR; INTRAVENOUS EVERY 30 MIN PRN
Status: COMPLETED | OUTPATIENT
Start: 2022-05-25 | End: 2022-05-25

## 2022-05-25 RX ORDER — CEFAZOLIN SODIUM/WATER 2 G/20 ML
2 SYRINGE (ML) INTRAVENOUS
Status: COMPLETED | OUTPATIENT
Start: 2022-05-25 | End: 2022-05-25

## 2022-05-25 RX ORDER — BUPIVACAINE HYDROCHLORIDE 2.5 MG/ML
INJECTION, SOLUTION INFILTRATION; PERINEURAL PRN
Status: DISCONTINUED | OUTPATIENT
Start: 2022-05-25 | End: 2022-05-25 | Stop reason: HOSPADM

## 2022-05-25 RX ORDER — MAGNESIUM SULFATE 4 G/50ML
4 INJECTION INTRAVENOUS ONCE
Status: COMPLETED | OUTPATIENT
Start: 2022-05-25 | End: 2022-05-25

## 2022-05-25 RX ORDER — OXYCODONE HYDROCHLORIDE 5 MG/1
5 TABLET ORAL EVERY 4 HOURS PRN
Status: DISCONTINUED | OUTPATIENT
Start: 2022-05-25 | End: 2022-05-25 | Stop reason: HOSPADM

## 2022-05-25 RX ORDER — SODIUM CHLORIDE, SODIUM LACTATE, POTASSIUM CHLORIDE, AND CALCIUM CHLORIDE .6; .31; .03; .02 G/100ML; G/100ML; G/100ML; G/100ML
IRRIGANT IRRIGATION PRN
Status: DISCONTINUED | OUTPATIENT
Start: 2022-05-25 | End: 2022-05-25 | Stop reason: HOSPADM

## 2022-05-25 RX ORDER — ONDANSETRON 2 MG/ML
INJECTION INTRAMUSCULAR; INTRAVENOUS PRN
Status: DISCONTINUED | OUTPATIENT
Start: 2022-05-25 | End: 2022-05-25

## 2022-05-25 RX ORDER — LIDOCAINE HYDROCHLORIDE AND EPINEPHRINE 10; 10 MG/ML; UG/ML
INJECTION, SOLUTION INFILTRATION; PERINEURAL PRN
Status: DISCONTINUED | OUTPATIENT
Start: 2022-05-25 | End: 2022-05-25 | Stop reason: HOSPADM

## 2022-05-25 RX ORDER — ACETAMINOPHEN 325 MG/1
975 TABLET ORAL ONCE
Status: DISCONTINUED | OUTPATIENT
Start: 2022-05-25 | End: 2022-05-25 | Stop reason: HOSPADM

## 2022-05-25 RX ORDER — HYDROMORPHONE HYDROCHLORIDE 1 MG/ML
0.2 INJECTION, SOLUTION INTRAMUSCULAR; INTRAVENOUS; SUBCUTANEOUS
Status: DISCONTINUED | OUTPATIENT
Start: 2022-05-25 | End: 2022-05-25 | Stop reason: HOSPADM

## 2022-05-25 RX ORDER — FENTANYL CITRATE 50 UG/ML
25 INJECTION, SOLUTION INTRAMUSCULAR; INTRAVENOUS
Status: DISCONTINUED | OUTPATIENT
Start: 2022-05-25 | End: 2022-05-25 | Stop reason: HOSPADM

## 2022-05-25 RX ORDER — KETOROLAC TROMETHAMINE 30 MG/ML
15 INJECTION, SOLUTION INTRAMUSCULAR; INTRAVENOUS EVERY 6 HOURS PRN
Status: DISCONTINUED | OUTPATIENT
Start: 2022-05-25 | End: 2022-05-25 | Stop reason: HOSPADM

## 2022-05-25 RX ORDER — GLYCOPYRROLATE 0.2 MG/ML
INJECTION, SOLUTION INTRAMUSCULAR; INTRAVENOUS PRN
Status: DISCONTINUED | OUTPATIENT
Start: 2022-05-25 | End: 2022-05-25

## 2022-05-25 RX ORDER — FENTANYL CITRATE 50 UG/ML
50 INJECTION, SOLUTION INTRAMUSCULAR; INTRAVENOUS EVERY 5 MIN PRN
Status: DISCONTINUED | OUTPATIENT
Start: 2022-05-25 | End: 2022-05-25 | Stop reason: HOSPADM

## 2022-05-25 RX ADMIN — ONDANSETRON 4 MG: 4 TABLET, ORALLY DISINTEGRATING ORAL at 17:31

## 2022-05-25 RX ADMIN — PROPOFOL 200 MG: 10 INJECTION, EMULSION INTRAVENOUS at 10:34

## 2022-05-25 RX ADMIN — ONDANSETRON 4 MG: 2 INJECTION INTRAMUSCULAR; INTRAVENOUS at 14:38

## 2022-05-25 RX ADMIN — SODIUM CHLORIDE, POTASSIUM CHLORIDE, SODIUM LACTATE AND CALCIUM CHLORIDE: 600; 310; 30; 20 INJECTION, SOLUTION INTRAVENOUS at 12:40

## 2022-05-25 RX ADMIN — HYDROMORPHONE HYDROCHLORIDE 0.2 MG: 1 INJECTION, SOLUTION INTRAMUSCULAR; INTRAVENOUS; SUBCUTANEOUS at 15:12

## 2022-05-25 RX ADMIN — ONDANSETRON 4 MG: 2 INJECTION INTRAMUSCULAR; INTRAVENOUS at 12:35

## 2022-05-25 RX ADMIN — NEOSTIGMINE METHYLSULFATE 3 MG: 1 INJECTION INTRAVENOUS at 12:58

## 2022-05-25 RX ADMIN — KETOROLAC TROMETHAMINE 15 MG: 30 INJECTION, SOLUTION INTRAMUSCULAR at 15:26

## 2022-05-25 RX ADMIN — OXYCODONE HYDROCHLORIDE 5 MG: 5 TABLET ORAL at 18:01

## 2022-05-25 RX ADMIN — SCOPALAMINE 1 PATCH: 1 PATCH, EXTENDED RELEASE TRANSDERMAL at 09:25

## 2022-05-25 RX ADMIN — OXYCODONE HYDROCHLORIDE 5 MG: 5 TABLET ORAL at 13:45

## 2022-05-25 RX ADMIN — ROCURONIUM BROMIDE 10 MG: 50 INJECTION, SOLUTION INTRAVENOUS at 12:09

## 2022-05-25 RX ADMIN — FENTANYL CITRATE 100 MCG: 50 INJECTION, SOLUTION INTRAMUSCULAR; INTRAVENOUS at 10:34

## 2022-05-25 RX ADMIN — ROCURONIUM BROMIDE 40 MG: 50 INJECTION, SOLUTION INTRAVENOUS at 10:35

## 2022-05-25 RX ADMIN — HYDROMORPHONE HYDROCHLORIDE 0.6 MG: 1 INJECTION, SOLUTION INTRAMUSCULAR; INTRAVENOUS; SUBCUTANEOUS at 12:50

## 2022-05-25 RX ADMIN — LIDOCAINE HYDROCHLORIDE 100 MG: 20 INJECTION, SOLUTION INFILTRATION; PERINEURAL at 10:34

## 2022-05-25 RX ADMIN — Medication 2 G: at 10:25

## 2022-05-25 RX ADMIN — ROCURONIUM BROMIDE 10 MG: 50 INJECTION, SOLUTION INTRAVENOUS at 11:32

## 2022-05-25 RX ADMIN — SODIUM CHLORIDE, POTASSIUM CHLORIDE, SODIUM LACTATE AND CALCIUM CHLORIDE: 600; 310; 30; 20 INJECTION, SOLUTION INTRAVENOUS at 11:16

## 2022-05-25 RX ADMIN — ROCURONIUM BROMIDE 10 MG: 50 INJECTION, SOLUTION INTRAVENOUS at 12:00

## 2022-05-25 RX ADMIN — KETOROLAC TROMETHAMINE 15 MG: 30 INJECTION, SOLUTION INTRAMUSCULAR at 12:35

## 2022-05-25 RX ADMIN — SODIUM CHLORIDE, POTASSIUM CHLORIDE, SODIUM LACTATE AND CALCIUM CHLORIDE: 600; 310; 30; 20 INJECTION, SOLUTION INTRAVENOUS at 09:17

## 2022-05-25 RX ADMIN — ACETAMINOPHEN 975 MG: 325 TABLET, COATED ORAL at 09:23

## 2022-05-25 RX ADMIN — DEXAMETHASONE SODIUM PHOSPHATE 10 MG: 4 INJECTION, SOLUTION INTRA-ARTICULAR; INTRALESIONAL; INTRAMUSCULAR; INTRAVENOUS; SOFT TISSUE at 10:49

## 2022-05-25 RX ADMIN — MIDAZOLAM 2 MG: 1 INJECTION INTRAMUSCULAR; INTRAVENOUS at 10:34

## 2022-05-25 RX ADMIN — PROPOFOL 50 MCG/KG/MIN: 10 INJECTION, EMULSION INTRAVENOUS at 10:39

## 2022-05-25 RX ADMIN — MAGNESIUM SULFATE HEPTAHYDRATE 4 G: 80 INJECTION, SOLUTION INTRAVENOUS at 09:32

## 2022-05-25 RX ADMIN — GLYCOPYRROLATE 0.6 MG: 0.2 INJECTION, SOLUTION INTRAMUSCULAR; INTRAVENOUS at 12:58

## 2022-05-25 NOTE — ANESTHESIA PREPROCEDURE EVALUATION
Anesthesia Pre-Procedure Evaluation    Patient: Aspen Penn   MRN: [id number] : 1985        Procedure : Procedure(s):  ROBOTIC SUPRACERVICAL HYSTERECTOMY BILATERAL SALPINGECTOMY SACROCOLPOPEXY, TRANSVAGINAL TAPING POSTERIOR REPAIR PARAVAGINAL REPAIR  BLADDER SUSPENSION WITH SLING - TISSUE FIXATION SYSTEM          Past Medical History:   Diagnosis Date     Abnormal Pap smear of cervix      Anxiety      Depressive disorder      Dysmenorrhea      Encounter for IUD insertion 2019     HPV (human papilloma virus) infection      Insomnia       (normal spontaneous vaginal delivery) 2019     Vasospasm (H) 2021      Past Surgical History:   Procedure Laterality Date     LASIK Bilateral      WISDOM TOOTH EXTRACTION        No Known Allergies   Social History     Tobacco Use     Smoking status: Never Smoker     Smokeless tobacco: Never Used   Substance Use Topics     Alcohol use: Yes     Alcohol/week: 4.0 - 5.0 standard drinks      Wt Readings from Last 1 Encounters:   22 71 kg (156 lb 8 oz)        Anesthesia Evaluation   Pt has had prior anesthetic.     No history of anesthetic complications       ROS/MED HX  ENT/Pulmonary:    (-) sleep apnea   Neurologic:  - neg neurologic ROS     Cardiovascular:  - neg cardiovascular ROS  (-) hypertension and dyslipidemia   METS/Exercise Tolerance:     Hematologic:  - neg hematologic  ROS     Musculoskeletal:  - neg musculoskeletal ROS     GI/Hepatic:    (-) GERD   Renal/Genitourinary: Comment: Pt with uterovaginal prolapse and desire for hysterectomy for contraception - s/f robotic supracervical hysterectomyand bilatearl salpingectomy, TVT      Endo:  - neg endo ROS     Psychiatric/Substance Use:       Infectious Disease: Comment: Recent Results (from the past 120 hour(s))  -Asymptomatic COVID-19 Virus (Coronavirus) by PCR Nose:   Collection Time: 22 10:39 AM  Specimen: Nose; Swab       Result                                             Value                         Ref Range                       SARS CoV2 PCR                                     Negative                      Negative, Testing sent t*        Malignancy:  - neg malignancy ROS     Other: Comment: Currently breast feeding    (-) Any chance pregnant       Physical Exam    Airway        Mallampati: I   TM distance: > 3 FB   Neck ROM: full   Mouth opening: > 3 cm    Respiratory Devices and Support         Dental  no notable dental history         Cardiovascular   cardiovascular exam normal          Pulmonary   pulmonary exam normal                OUTSIDE LABS:  CBC:   Lab Results   Component Value Date    WBC 8.8 09/15/2021    WBC 10.1 08/24/2018    HGB 12.9 05/13/2022    HGB 12.1 03/27/2022    HCT 37.4 09/15/2021    HCT 36.1 08/24/2018     09/15/2021     08/24/2018     BMP:   Lab Results   Component Value Date     09/23/2020    POTASSIUM 4.4 09/23/2020    CHLORIDE 104 09/23/2020    CO2 29 09/23/2020    BUN 13 09/23/2020    CR 0.93 09/23/2020    GLC 97 09/23/2020    GLC 86 01/05/2018     COAGS: No results found for: PTT, INR, FIBR  POC:   Lab Results   Component Value Date    HCG Negative 05/13/2022     HEPATIC:   Lab Results   Component Value Date    ALBUMIN 4.1 09/23/2020    PROTTOTAL 7.1 09/23/2020    ALT 13 09/23/2020    AST 20 09/23/2020    ALKPHOS 48 09/23/2020    BILITOTAL 0.9 09/23/2020     OTHER:   Lab Results   Component Value Date    TREVIN 9.4 09/23/2020    TSH 0.88 09/15/2021       Anesthesia Plan    ASA Status:  2   NPO Status:  NPO Appropriate    Anesthesia Type: General.     - Airway: ETT   Induction: Intravenous, Propofol.   Maintenance: Balanced.        Consents    Anesthesia Plan(s) and associated risks, benefits, and realistic alternatives discussed. Questions answered and patient/representative(s) expressed understanding.    - Discussed:     - Discussed with:  Patient         Postoperative Care    Pain management: Multi-modal analgesia, IV  analgesics.   PONV prophylaxis: Ondansetron (or other 5HT-3), Scopolamine patch, Background Propofol Infusion, Dexamethasone or Solumedrol     Comments:    Other Comments: GETA  Decadron 10, zofran 4, scop patch, propofol gtt for antiemesis  Magnesium gtt, toradol 15mg at case closure if cleared with surgeon  No ketamine or meperidine given lactation status             Carissa Hopkins MD

## 2022-05-25 NOTE — ANESTHESIA CARE TRANSFER NOTE
Patient: Aspen Penn    Procedure: Procedure(s):  ROBOTIC SUPRACERVICAL HYSTERECTOMY BILATERAL SALPINGECTOMY SACROCOLPOPEXY,  PARAVAGINAL REPAIR  COLPORRHAPHY, POSTERIOR       Diagnosis: Uterovaginal prolapse [N81.4]  Diagnosis Additional Information: No value filed.    Anesthesia Type:   General     Note:    Oropharynx: oropharynx clear of all foreign objects  Level of Consciousness: drowsy  Oxygen Supplementation: face mask  Level of Supplemental Oxygen (L/min / FiO2): 8  Independent Airway: airway patency satisfactory and stable  Dentition: dentition unchanged  Vital Signs Stable: post-procedure vital signs reviewed and stable  Report to RN Given: handoff report given  Patient transferred to: PACU    Handoff Report: Identifed the Patient, Identified the Reponsible Provider, Reviewed the pertinent medical history, Discussed the surgical course, Reviewed Intra-OP anesthesia mangement and issues during anesthesia, Set expectations for post-procedure period and Allowed opportunity for questions and acknowledgement of understanding      Vitals:  Vitals Value Taken Time   /63 05/25/22 1315   Temp 98.6    Pulse 78 05/25/22 1318   Resp 8 05/25/22 1318   SpO2 99 % 05/25/22 1318   Vitals shown include unvalidated device data.    Electronically Signed By: BASSEM Leal CRNA  May 25, 2022  1:19 PM

## 2022-05-25 NOTE — ANESTHESIA PROCEDURE NOTES
Airway       Patient location during procedure: OR       Procedure Start/Stop Times: 5/25/2022 10:38 AM  Staff -        CRNA: Vernell Serrato APRN CRNA       Performed By: CRNA  Consent for Airway        Urgency: elective  Indications and Patient Condition       Indications for airway management: roman-procedural       Induction type:intravenous       Mask difficulty assessment: 1 - vent by mask    Final Airway Details       Final airway type: endotracheal airway       Successful airway: ETT - single  Endotracheal Airway Details        ETT size (mm): 7.0       Cuffed: yes       Successful intubation technique: direct laryngoscopy       DL Blade Type: MAC 3       Grade View of Cords: 1       Adjucts: stylet       Position: Right       Measured from: gums/teeth       Secured at (cm): 21       Bite block used: None    Post intubation assessment        Placement verified by: capnometry, equal breath sounds and chest rise        Number of attempts at approach: 1       Secured with: silk tape       Ease of procedure: easy       Dentition: Intact and Unchanged    Medication(s) Administered   Medication Administration Time: 5/25/2022 10:38 AM

## 2022-05-25 NOTE — H&P
History and Physical Update    I have examined the patient and reviewed the history and physical that is present on this chart. The changes in the patient's history and physical condition are as follows:    None    Dennis Heller MD

## 2022-05-25 NOTE — ANESTHESIA POSTPROCEDURE EVALUATION
Patient: Aspen Penn    Procedure: Procedure(s):  ROBOTIC SUPRACERVICAL HYSTERECTOMY BILATERAL SALPINGECTOMY SACROCOLPOPEXY,  PARAVAGINAL REPAIR  COLPORRHAPHY, POSTERIOR       Anesthesia Type:  General    Note:  Disposition: Outpatient   Postop Pain Control: Uneventful            Sign Out: Well controlled pain   PONV: No   Neuro/Psych: Uneventful            Sign Out: Acceptable/Baseline neuro status   Airway/Respiratory: Uneventful            Sign Out: Acceptable/Baseline resp. status   CV/Hemodynamics: Uneventful            Sign Out: Acceptable CV status; No obvious hypovolemia; No obvious fluid overload   Other NRE: NONE   DID A NON-ROUTINE EVENT OCCUR?            Last vitals:  Vitals Value Taken Time   /63 05/25/22 1330   Temp     Pulse 66 05/25/22 1331   Resp 21 05/25/22 1331   SpO2 93 % 05/25/22 1331   Vitals shown include unvalidated device data.    Electronically Signed By: Carissa Hopkins MD  May 25, 2022  1:32 PM

## 2022-05-25 NOTE — BRIEF OP NOTE
[facility]    Brief Operative Note    Pre-operative diagnosis: Uterovaginal prolapse [N81.4]  Post-operative diagnosis Same as pre-operative diagnosis    Procedure: Procedure(s):  ROBOTIC SUPRACERVICAL HYSTERECTOMY BILATERAL SALPINGECTOMY SACROCOLPOPEXY,  PARAVAGINAL REPAIR  COLPORRHAPHY, POSTERIOR repair  Surgeon: Surgeon(s) and Role:     * Dennis Heller MD - Primary  Anesthesia: General   Estimated Blood Loss: 30 mL from 5/25/2022 10:27 AM to 5/25/2022  1:09 PM      Drains: None  Specimens:   ID Type Source Tests Collected by Time Destination   1 : Uterus- morcellated, bilateral fallopian tubes Tissue Uterus, Bilateral Fallopian Tubes SURGICAL PATHOLOGY EXAM Dennis Heller MD 5/25/2022 12:34 PM      Findings:   See dicated note.  Complications: None.  Implants:   Implant Name Type Inv. Item Serial No.  Lot No. LRB No. Used Action   SLING Y MESH RESTORELLE CONTOUR [id number] - [id number] Mesh SLING Y MESH RESTORELLE CONTOUR [id number]  COLOPLAST [id number] N/A 1 Implanted

## 2022-05-26 LAB
PATH REPORT.COMMENTS IMP SPEC: NORMAL
PATH REPORT.COMMENTS IMP SPEC: NORMAL
PATH REPORT.FINAL DX SPEC: NORMAL
PATH REPORT.GROSS SPEC: NORMAL
PATH REPORT.MICROSCOPIC SPEC OTHER STN: NORMAL
PATH REPORT.RELEVANT HX SPEC: NORMAL
PHOTO IMAGE: NORMAL

## 2022-05-26 PROCEDURE — 88305 TISSUE EXAM BY PATHOLOGIST: CPT | Mod: 26 | Performed by: PATHOLOGY

## 2022-05-30 NOTE — OP NOTE
Procedure Date: 05/25/2022    PREOPERATIVE DIAGNOSIS:  Uterovaginal prolapse.    POSTOPERATIVE DIAGNOSIS:  Uterovaginal prolapse.    PROCEDURE:  Robotic supracervical hysterectomy, bilateral salpingectomy, sacral colpopexy, bilateral paravaginal defect repair and posterior colporrhaphy.    SURGEON:  Dennis Heller MD    ASSISTANT:  Gia Vergara    ANESTHESIA:  General.    ESTIMATED BLOOD LOSS:  30 mL, replaced with lactated Ringer's.      DRAINS:  Included a Holden catheter.    COMPLICATIONS:  None.    INDICATIONS FOR PROCEDURE:  This is a 37-year-old multiparous female with significant uterovaginal prolapse.  She was given informed consent for the above procedure.  The risks, benefits and alternatives were discussed at length.  She expressed understanding and wished to proceed.    OPERATIVE FINDINGS:  There was a significant apical defect to the -2 position and a larger anterior defect present.  The posterior defect was more minimal, but there was a posterior defect present.  Ovaries appeared normal.  No other abnormalities were noted.    PROCEDURE AND FINDINGS:  The patient was brought to the operating room and after induction of general anesthesia, was prepped and draped in the dorsal lithotomy position.  A timeout was called and the patient and procedure were verified.  A JEREMIAH catheter was attached to the cervix.  A Holden was placed and attention was directed to the abdomen.  A supraumbilical incision was made and a Veress needle was inserted.  The abdomen was then insufflated with 3 liters of CO2 and an 8 mm trocar and trocar sheath was inserted.  Two additional incisions were then made to the right and to the left and the appropriate robotic trocars were placed.  The patient was placed in steep Trendelenburg.  The robot was docked and I took my place at the console.    The anatomy was then identified.  Both ureters were identified transperitoneally.  Upper abdomen was normal.  The vessel sealer was used to come  across the mesosalpinx on each side and then the uterine suspensory ligaments.  A series of parametrial bites were then obtained and a monopolar scissors was used to take down the bladder flap.  The same instrument was then used to make an incision at the uterocervical junction and the uterus and tubes were placed in the left upper quadrant.  Attention was then directed to the sacral promontory.  The sigmoid colon was retracted laterally, the peritoneum over the sacral promontory was opened.  Small fat pad was removed.  The median sacral artery was cauterized and the anterior longitudinal ligament was exposed.  The cervix was then oversewn using 0 Vicryl interrupted sutures.  The peritoneum over the posterior cervix was also opened.  The Coloplast Y-shaped mesh was then applied to the cervix and affixed using V-Loc sutures both anteriorly and posteriorly.  [place] forceps was then used to tunnel underneath the peritoneum from the sacral promontory and the long arm of the Restorelle contour mesh was brought to the anterior longitudinal ligament where it was fixed using 3 interrupted [place]-Ray sutures.  It was then appropriately trimmed and the entire area was reperitonealized using a 3-0 V-Loc PDS.  The bladder was then filled with 300 mL of saline.  The space of Retzius was opened using monopolar scissors and dissection was carried out laterally.  The bilateral paravaginal defect was identified.  The bladder was drained.  An Ethibond suture was then used to reapproximate the cervicovaginal fascia to its original connection to the arcus tendineus on both the left and right sides.  Excellent elevation of the anterior vault was obtained in this manner.  Good hemostasis was noted and the area was then reperitonealized using a 3-0 V-Loc suture.  The robot was then undocked.  The morcellator was introduced and the specimen removed.  All small parts were removed and submitted for pathologic exam.  The trocars were then  removed.  The fascia was closed with 0 Vicryl and the skin was closed with nylon.  Attention was then directed vaginally where 1% lidocaine with epinephrine was injected into the perineal body and the posterior vaginal mucosa.  Allis clamps were placed at 5 and 7 o'clock and a sharp knife was used to make a V-type incision on the perineal body.  A Cortez was then used in the midline and the vaginal mucosa was dissected from its underlying fascial attachments.  This dissection was then carried out laterally to the levators and then the fascia was reapproximated in 3 layers using interrupted 0 Vicryl sutures; 2-0 Vicryl sutures were then used to bring together the perineal body and the same suture was used to reapproximate the vaginal mucosa.  A 3-0 Vicryl was then used as a subcuticular stitch to close the skin of the perineal body and excellent results were noted.  Good hemostasis was noted and at this point, the decision was made to terminate the procedure.  Originally, the Holden was removed, but the patient had difficulty voiding postop and in the recovery area.  A Holden was reinserted prior to discharge.  Sponge and instrument counts were correct.  Good hemostasis was noted at the end of the procedure.  The patient tolerated the procedure well and was taken to the recovery room in good condition.    Dennis Heller MD   Female Pelvic Medicine and Reconstructive Surgery          D: 2022   T: 2022   MT: [email or web address]    Name:     MICHAEL MASCORRO  MRN:      [id number]        Account:        [id number]   :      1985           Procedure Date: 2022     Document: [id number]

## 2022-06-04 ENCOUNTER — HOSPITAL ENCOUNTER (EMERGENCY)
Facility: CLINIC | Age: 37
Discharge: HOME | End: 2022-06-04
Attending: STUDENT IN AN ORGANIZED HEALTH CARE EDUCATION/TRAINING PROGRAM | Admitting: STUDENT IN AN ORGANIZED HEALTH CARE EDUCATION/TRAINING PROGRAM
Payer: COMMERCIAL

## 2022-06-04 ENCOUNTER — APPOINTMENT (OUTPATIENT)
Dept: CT IMAGING | Facility: CLINIC | Age: 37
End: 2022-06-04
Attending: STUDENT IN AN ORGANIZED HEALTH CARE EDUCATION/TRAINING PROGRAM
Payer: COMMERCIAL

## 2022-06-04 VITALS
OXYGEN SATURATION: 100 % | RESPIRATION RATE: 16 BRPM | BODY MASS INDEX: 27.44 KG/M2 | SYSTOLIC BLOOD PRESSURE: 127 MMHG | TEMPERATURE: 98.7 F | HEART RATE: 82 BPM | WEIGHT: 150 LBS | DIASTOLIC BLOOD PRESSURE: 74 MMHG

## 2022-06-04 DIAGNOSIS — R10.31 ABDOMINAL PAIN, RIGHT LOWER QUADRANT: ICD-10-CM

## 2022-06-04 LAB
ALBUMIN SERPL-MCNC: 3.8 G/DL (ref 3.5–5)
ALBUMIN UR-MCNC: NEGATIVE MG/DL
ALP SERPL-CCNC: 79 U/L (ref 45–120)
ALT SERPL W P-5'-P-CCNC: <9 U/L (ref 0–45)
ANION GAP SERPL CALCULATED.3IONS-SCNC: 9 MMOL/L (ref 5–18)
APPEARANCE UR: CLEAR
AST SERPL W P-5'-P-CCNC: 14 U/L (ref 0–40)
BASOPHILS # BLD AUTO: 0 10E3/UL (ref 0–0.2)
BASOPHILS NFR BLD AUTO: 0 %
BILIRUB SERPL-MCNC: 1.7 MG/DL (ref 0–1)
BILIRUB UR QL STRIP: NEGATIVE
BUN SERPL-MCNC: 7 MG/DL (ref 8–22)
CALCIUM SERPL-MCNC: 9.4 MG/DL (ref 8.5–10.5)
CHLORIDE BLD-SCNC: 105 MMOL/L (ref 98–107)
CO2 SERPL-SCNC: 27 MMOL/L (ref 22–31)
COLOR UR AUTO: YELLOW
CREAT SERPL-MCNC: 0.73 MG/DL (ref 0.6–1.1)
EOSINOPHIL # BLD AUTO: 0.3 10E3/UL (ref 0–0.7)
EOSINOPHIL NFR BLD AUTO: 3 %
ERYTHROCYTE [DISTWIDTH] IN BLOOD BY AUTOMATED COUNT: 13.2 % (ref 10–15)
GFR SERPL CREATININE-BSD FRML MDRD: >90 ML/MIN/1.73M2
GLUCOSE BLD-MCNC: 87 MG/DL (ref 70–125)
GLUCOSE UR STRIP-MCNC: NEGATIVE MG/DL
HCT VFR BLD AUTO: 30.5 % (ref 35–47)
HGB BLD-MCNC: 9.5 G/DL (ref 11.7–15.7)
HGB UR QL STRIP: ABNORMAL
IMM GRANULOCYTES # BLD: 0 10E3/UL
IMM GRANULOCYTES NFR BLD: 0 %
KETONES UR STRIP-MCNC: NEGATIVE MG/DL
LACTATE SERPL-SCNC: 0.6 MMOL/L (ref 0.7–2)
LEUKOCYTE ESTERASE UR QL STRIP: NEGATIVE
LYMPHOCYTES # BLD AUTO: 2.4 10E3/UL (ref 0.8–5.3)
LYMPHOCYTES NFR BLD AUTO: 23 %
MCH RBC QN AUTO: 30.3 PG (ref 26.5–33)
MCHC RBC AUTO-ENTMCNC: 31.1 G/DL (ref 31.5–36.5)
MCV RBC AUTO: 97 FL (ref 78–100)
MONOCYTES # BLD AUTO: 1 10E3/UL (ref 0–1.3)
MONOCYTES NFR BLD AUTO: 10 %
MUCOUS THREADS #/AREA URNS LPF: PRESENT /LPF
NEUTROPHILS # BLD AUTO: 6.3 10E3/UL (ref 1.6–8.3)
NEUTROPHILS NFR BLD AUTO: 64 %
NITRATE UR QL: NEGATIVE
NRBC # BLD AUTO: 0 10E3/UL
NRBC BLD AUTO-RTO: 0 /100
PH UR STRIP: 7.5 [PH] (ref 5–7)
PLATELET # BLD AUTO: 427 10E3/UL (ref 150–450)
POTASSIUM BLD-SCNC: 4.1 MMOL/L (ref 3.5–5)
PROT SERPL-MCNC: 7.4 G/DL (ref 6–8)
RBC # BLD AUTO: 3.14 10E6/UL (ref 3.8–5.2)
RBC URINE: 3 /HPF
SODIUM SERPL-SCNC: 141 MMOL/L (ref 136–145)
SP GR UR STRIP: 1.01 (ref 1–1.03)
SQUAMOUS EPITHELIAL: 1 /HPF
UROBILINOGEN UR STRIP-MCNC: <2 MG/DL
WBC # BLD AUTO: 10 10E3/UL (ref 4–11)
WBC URINE: 4 /HPF

## 2022-06-04 PROCEDURE — 36415 COLL VENOUS BLD VENIPUNCTURE: CPT | Performed by: STUDENT IN AN ORGANIZED HEALTH CARE EDUCATION/TRAINING PROGRAM

## 2022-06-04 PROCEDURE — 87040 BLOOD CULTURE FOR BACTERIA: CPT | Performed by: STUDENT IN AN ORGANIZED HEALTH CARE EDUCATION/TRAINING PROGRAM

## 2022-06-04 PROCEDURE — 80053 COMPREHEN METABOLIC PANEL: CPT | Performed by: STUDENT IN AN ORGANIZED HEALTH CARE EDUCATION/TRAINING PROGRAM

## 2022-06-04 PROCEDURE — 85025 COMPLETE CBC W/AUTO DIFF WBC: CPT | Performed by: STUDENT IN AN ORGANIZED HEALTH CARE EDUCATION/TRAINING PROGRAM

## 2022-06-04 PROCEDURE — 99285 EMERGENCY DEPT VISIT HI MDM: CPT | Mod: 25

## 2022-06-04 PROCEDURE — 74177 CT ABD & PELVIS W/CONTRAST: CPT

## 2022-06-04 PROCEDURE — 83605 ASSAY OF LACTIC ACID: CPT | Performed by: STUDENT IN AN ORGANIZED HEALTH CARE EDUCATION/TRAINING PROGRAM

## 2022-06-04 PROCEDURE — 250N000011 HC RX IP 250 OP 636: Performed by: STUDENT IN AN ORGANIZED HEALTH CARE EDUCATION/TRAINING PROGRAM

## 2022-06-04 PROCEDURE — 81001 URINALYSIS AUTO W/SCOPE: CPT | Performed by: STUDENT IN AN ORGANIZED HEALTH CARE EDUCATION/TRAINING PROGRAM

## 2022-06-04 RX ORDER — IOPAMIDOL 755 MG/ML
100 INJECTION, SOLUTION INTRAVASCULAR ONCE
Status: COMPLETED | OUTPATIENT
Start: 2022-06-04 | End: 2022-06-04

## 2022-06-04 RX ADMIN — IOPAMIDOL 100 ML: 755 INJECTION, SOLUTION INTRAVENOUS at 13:12

## 2022-06-04 ASSESSMENT — ENCOUNTER SYMPTOMS
ABDOMINAL PAIN: 1
DIARRHEA: 1
BLOOD IN STOOL: 0
SHORTNESS OF BREATH: 0
CHILLS: 1
CONSTIPATION: 1
DYSURIA: 1
VOMITING: 0
FEVER: 1
COUGH: 0

## 2022-06-04 NOTE — PROGRESS NOTES
GYN NOTE    Pt seen and examined  CT reviewed  Spoke with Dr Lawson  Hematoma noted  Plan outpt treatment of uti with close follow up    Dennis Heller MD

## 2022-06-04 NOTE — ED NOTES
Pt complains of fever of 100.7 consistently starting this am. Reports vaginal discharge. See provider note for vaginal assessment.

## 2022-06-04 NOTE — ED TRIAGE NOTES
Patient is here fever. She is 9 weeks post partum. She did have a partial hysterectomy 9 days ago and developed a fever of 102 on Tuesday. She went to the clinic and was dx with a UTI with a clean UA. She then stated her fever stayed normal until last night. She does complain of drainage unsure if its from her sutures or vaginal area.

## 2022-06-04 NOTE — DISCHARGE INSTRUCTIONS
Follow up plan with Dr. Heller was discussed  Can return here with any worsening symptoms or concerns

## 2022-06-04 NOTE — ED PROVIDER NOTES
NAME: Aspen Penn  AGE: 37 year old female  YOB: 1985  MRN: [id number]  EVALUATION DATE & TIME: 2022 10:50 AM    PCP: Nanda Rodriguez    ED PROVIDER: Trixei Al MD.      No chief complaint on file.        FINAL IMPRESSION:  1. Abdominal pain, right lower quadrant        MEDICAL DECISION MAKIN:22 AM I met with the patient, obtained history, performed an initial exam, and discussed options and plan for diagnostics and treatment here in the ED.   11:40 AM Spoke with OBDr. Heller.  1:40 PM Spoke with OBDr. Heller.   1:45 PM Checked in on and updated patient. Dr. Heller at bedside.   2:30 PM Checked in on and updated patient. I discussed the plan for discharge with the patient, and patient is agreeable.  We discussed supportive cares at home and reasons for return to the ER including new or worsening symptoms - all questions and concerns addressed.  Patient to be discharged by RN.    Pertinent Labs & Imaging studies reviewed. (See chart for details)     MDM: 38 y/o F who presents with abdominal pain and fever. Underwent surgery for uterovaginal prolapse with Dr. Heller on  as described in HPI. Subsequently developed fever and was started on antibiotics for possible UTI. Continues to have fever up to 100.7 and now some RLQ pain. She is afebrile here with reassuring vitals and nontoxic appearing. Lactic acid is normal, no leukocytosis. Do not suspect sepsis and no other obvious source or fever (do not suspect pneumonia, PE/DVT, mastitis, cellulitis, etc). Hgb has dropped from 12.7 preop to 9.5 today. Other labs are reassuring. CT abd/pelvis shows large hematoma in the anterior pelvis anterior to the bladder, nothing concerning for active bleeding. Dr. Heller came and saw patient in the ED and plan for close outpatient management of this. Vitals remain reassuring. Patient in agreement with plan. Strict return precautions discussed. She agreed with plan and her questions  were answered.    MEDICATIONS GIVEN IN THE EMERGENCY:  Medications   iopamidol (ISOVUE-370) solution 100 mL (100 mLs Intravenous Given 6/4/22 1312)       NEW PRESCRIPTIONS STARTED AT TODAY'S ER VISIT:  Discharge Medication List as of 6/4/2022  2:29 PM             =================================================================    HPI    Patient information was obtained from: Patient    Use of : N/A    Aspen Penn is a 37 year old female who presents with a post-op problem.     On 5/25/2022 patient had following surgery with Dr. Heller: robotic supracervical hysterectomy, bilateral salpingectomy, sacral colpopexy, bilateral paravaginal defect repair and posterior colporrhaphy. Briefly required ruiz cathter after this for retention. She then started having a fever of 102, and chills. She followed up with Dr. Heller and was  placed on antibiotics for possible UTI, and her fever went down. Last night, her temperature went back up to 100.7, and was at 100.7 again this morning. She states having a slight throat tickle, and was severely constipated, but took mag citrate and is now having diarrhea. She endorses having right lower quadrant abdominal pain, and some dysuria which she states is like a bladder spasm, and not the same as UTI pain. She has been having discharge, ranging from yellow, to brown, to pink. She is lactating, her last baby was March 26th.     Patient denies cough, vomiting, shortness of breath, leg swelling, or any other complaints at this time.       REVIEW OF SYSTEMS   Review of Systems   Constitutional: Positive for chills and fever.   Respiratory: Negative for cough and shortness of breath.    Cardiovascular: Negative for chest pain and leg swelling.   Gastrointestinal: Positive for abdominal pain (RLQ), constipation (Resolved) and diarrhea. Negative for blood in stool and vomiting.   Genitourinary: Positive for dysuria and vaginal discharge (Yellow, brown, pink).   All other  systems reviewed and are negative.       PAST MEDICAL HISTORY:  Past Medical History:   Diagnosis Date     Abnormal Pap smear of cervix      Anxiety      Depressive disorder      Dysmenorrhea      Encounter for IUD insertion 2019     HPV (human papilloma virus) infection      Insomnia       (normal spontaneous vaginal delivery) 2019     Vasospasm (H) 2021       PAST SURGICAL HISTORY:  Past Surgical History:   Procedure Laterality Date     COLPORRHAPHY POSTERIOR N/A 2022    Procedure: COLPORRHAPHY, POSTERIOR;  Surgeon: Dennis Heller MD;  Location: [facility]     HYSTERECTOMY, SUPRACERVICAL, ROBOT-ASSISTED, DA MARTÍN XI, W/ROGER, SACROCOLPOPEXY, PARAVAG & POST RPR Bilateral 2022    Procedure: ROBOTIC SUPRACERVICAL HYSTERECTOMY BILATERAL SALPINGECTOMY SACROCOLPOPEXY,  PARAVAGINAL REPAIR;  Surgeon: Dennis Heller MD;  Location: [facility] OR     LASIK Bilateral      WISDOM TOOTH EXTRACTION         CURRENT MEDICATIONS:    No current facility-administered medications for this encounter.    Current Outpatient Medications:      desvenlafaxine (PRISTIQ) 50 MG 24 hr tablet, Take 50 mg by mouth daily, Disp: , Rfl:      oxyCODONE (ROXICODONE) 5 MG tablet, Take 1-2 tablets (5-10 mg) by mouth every 4 hours as needed for moderate to severe pain, Disp: 16 tablet, Rfl: 0     valACYclovir (VALTREX) 1000 MG tablet, [VALACYCLOVIR (VALTREX) 1000 MG TABLET] TAKE TWO TABLETS BY MOUTH TWICE DAILY FOR 2 DAYS, Disp: 8 tablet, Rfl: 11     zolpidem (AMBIEN) 5 MG tablet, Take 1 tablet (5 mg) by mouth nightly as needed for sleep, Disp: 25 tablet, Rfl: 0    ALLERGIES:  No Known Allergies    FAMILY HISTORY:  Family History   Problem Relation Age of Onset     Hypothyroidism Mother      Thyroid Disease Mother         Hypothyroid     No Known Problems Father      No Known Problems Sister      No Known Problems Brother      Endometrial Cancer Maternal Grandmother      Hypothyroidism  Maternal Grandmother      Coronary Artery Disease Maternal Grandmother      Other Cancer Maternal Grandmother         Endometrial     Thyroid Disease Maternal Grandmother         Hypothyroid     Heart Disease Maternal Grandfather      Other Cancer Maternal Grandfather         Follicular lymphoma     Cancer Paternal Grandmother        SOCIAL HISTORY:   Social History     Socioeconomic History     Marital status:    Tobacco Use     Smoking status: Never Smoker     Smokeless tobacco: Never Used   Substance and Sexual Activity     Alcohol use: Yes     Alcohol/week: 4.0 - 5.0 standard drinks     Comment: 7 drinks per week     Drug use: No     Sexual activity: Yes     Partners: Male     Birth control/protection: None   Other Topics Concern     Parent/sibling w/ CABG, MI or angioplasty before 65F 55M? No       PHYSICAL EXAM:    Vitals: /74   Pulse 82   Temp 98.7  F (37.1  C)   Resp 16   Wt 68 kg (150 lb)   LMP 06/30/2021   SpO2 100%   BMI 27.44 kg/m     Constitutional: Well developed, well nourished. Comfortable appearing.  HENT: Normocephalic, atraumatic, mucous membranes moist, nose normal. Neck- Supple, gross ROM intact.   Eyes: Pupils mid-range, sclera white, no discharge  Respiratory: Clear to auscultation bilaterally, no respiratory distress, no wheezing, speaks full sentences easily.  Cardiovascular: Normal heart rate, regular rhythm, no murmurs. No lower extremity edema   GI: Soft, not distended, RLQ tenderness. Incisions are without erythema or dehiscence.  : external exam done with female RN in room: sutures in place, no erythema. No drainage or discharge appreciated.  Breast: no erythema or drainage  Musculoskeletal: Moving all 4 extremities intentionally and without pain. No obvious deformity.  Skin: Warm, dry, no rash.  Neurologic: Alert & oriented x 3, speech clear, moving all extremities spontaneously   Psychiatric: Affect normal, cooperative.       LAB:  All pertinent labs reviewed and  interpreted.  Labs Ordered and Resulted from Time of ED Arrival to Time of ED Departure   COMPREHENSIVE METABOLIC PANEL - Abnormal       Result Value    Sodium 141      Potassium 4.1      Chloride 105      Carbon Dioxide (CO2) 27      Anion Gap 9      Urea Nitrogen 7 (*)     Creatinine 0.73      Calcium 9.4      Glucose 87      Alkaline Phosphatase 79      AST 14      ALT <9      Protein Total 7.4      Albumin 3.8      Bilirubin Total 1.7 (*)     GFR Estimate >90     LACTIC ACID WHOLE BLOOD - Abnormal    Lactic Acid 0.6 (*)    ROUTINE UA WITH MICROSCOPIC REFLEX TO CULTURE - Abnormal    Color Urine Yellow      Appearance Urine Clear      Glucose Urine Negative      Bilirubin Urine Negative      Ketones Urine Negative      Specific Gravity Urine 1.015      Blood Urine 0.03 mg/dL (*)     pH Urine 7.5 (*)     Protein Albumin Urine Negative      Urobilinogen Urine <2.0      Nitrite Urine Negative      Leukocyte Esterase Urine Negative      Mucus Urine Present (*)     RBC Urine 3 (*)     WBC Urine 4      Squamous Epithelials Urine 1     CBC WITH PLATELETS AND DIFFERENTIAL - Abnormal    WBC Count 10.0      RBC Count 3.14 (*)     Hemoglobin 9.5 (*)     Hematocrit 30.5 (*)     MCV 97      MCH 30.3      MCHC 31.1 (*)     RDW 13.2      Platelet Count 427      % Neutrophils 64      % Lymphocytes 23      % Monocytes 10      % Eosinophils 3      % Basophils 0      % Immature Granulocytes 0      NRBCs per 100 WBC 0      Absolute Neutrophils 6.3      Absolute Lymphocytes 2.4      Absolute Monocytes 1.0      Absolute Eosinophils 0.3      Absolute Basophils 0.0      Absolute Immature Granulocytes 0.0      Absolute NRBCs 0.0     BLOOD CULTURE   BLOOD CULTURE       RADIOLOGY:  CT Abdomen Pelvis w Contrast   Final Result   IMPRESSION:    1.  Postop change from recent pelvic surgery with hysterectomy.      2.   Large 9.7 x 5.1 cm hematoma in the anterior pelvis anterior to the bladder. Nothing concerning for active bleeding.      3.   Tiny amount of free fluid in the pelvis.      4.  No abscesses.          PROCEDURES:   Procedures       I, Bianca Ozzy, am serving as a scribe to document services personally performed by Dr. Trixie Al based on my observation and the provider's statements to me. I, Trixie Al MD attest that Sharynbrianne Ozzy is acting in a scribe capacity, has observed my performance of the services and has documented them in accordance with my direction.      Trixie Al M.D.  Emergency Medicine  [facility] EMERGENCY ROOM  [address] [address]  [phone]  Dept: [phone]     Trixie Al MD  06/04/22 0486

## 2022-06-08 ENCOUNTER — E-VISIT (OUTPATIENT)
Dept: FAMILY MEDICINE | Facility: CLINIC | Age: 37
End: 2022-06-08
Payer: COMMERCIAL

## 2022-06-08 DIAGNOSIS — R52 PAIN MANAGEMENT: Primary | ICD-10-CM

## 2022-06-08 DIAGNOSIS — G47.9 SLEEP DISTURBANCE: ICD-10-CM

## 2022-06-08 DIAGNOSIS — G47.00 INSOMNIA, UNSPECIFIED TYPE: ICD-10-CM

## 2022-06-08 PROCEDURE — 99421 OL DIG E/M SVC 5-10 MIN: CPT | Performed by: FAMILY MEDICINE

## 2022-06-08 RX ORDER — ZOLPIDEM TARTRATE 5 MG/1
5 TABLET ORAL
Qty: 25 TABLET | Refills: 0 | Status: SHIPPED | OUTPATIENT
Start: 2022-06-08

## 2022-06-08 RX ORDER — AZITHROMYCIN 250 MG/1
TABLET, FILM COATED ORAL
COMMUNITY

## 2022-06-08 RX ORDER — CEFUROXIME AXETIL 500 MG/1
TABLET ORAL
COMMUNITY

## 2022-06-08 NOTE — PATIENT INSTRUCTIONS
Thank you for choosing us for your care. I have placed an order for a prescription so that you can continue treatment. View your full visit summary for details by clicking on the link below. Your pharmacist will able to address any questions you may have about the medication.     I will send a refill.    Dr. TEMPLETON

## 2022-06-09 LAB
BACTERIA BLD CULT: NO GROWTH
BACTERIA BLD CULT: NO GROWTH

## 2022-09-24 ENCOUNTER — HEALTH MAINTENANCE LETTER (OUTPATIENT)
Age: 37
End: 2022-09-24

## 2022-09-30 ENCOUNTER — IMMUNIZATION (OUTPATIENT)
Dept: PEDIATRICS | Facility: CLINIC | Age: 37
End: 2022-09-30
Payer: COMMERCIAL

## 2022-09-30 PROCEDURE — 0124A COVID-19,PF,PFIZER BOOSTER BIVALENT: CPT

## 2022-09-30 PROCEDURE — 90686 IIV4 VACC NO PRSV 0.5 ML IM: CPT

## 2022-09-30 PROCEDURE — 90471 IMMUNIZATION ADMIN: CPT

## 2022-09-30 PROCEDURE — 91312 COVID-19,PF,PFIZER BOOSTER BIVALENT: CPT

## 2022-10-03 ENCOUNTER — MEDICAL CORRESPONDENCE (OUTPATIENT)
Dept: HEALTH INFORMATION MANAGEMENT | Facility: CLINIC | Age: 37
End: 2022-10-03

## 2022-11-04 DIAGNOSIS — Z84.81 FAMILY HISTORY OF GENETIC DISEASE CARRIER: Primary | ICD-10-CM

## 2022-11-04 NOTE — PROGRESS NOTES
Aspen will be participating as a family member in exome sequencing for her son, Casey ([id number]).  Please refer to their chart for additional details.     Verbal informed consent was obtained on 10/5/2022 after reviewing the risks, benefits, and limitations of participating in exome sequencing as a family member    Aspen consented to receiving secondary findings for themself after reviewing information regarding them.    April Seymour MS [facility]  Genetic Counselor  Email: [email or web address]  Phone: [phone]  Pager: [phone]

## 2023-01-17 DIAGNOSIS — Z00.6 EXAMINATION OF PARTICIPANT OR CONTROL IN CLINICAL RESEARCH: Primary | ICD-10-CM

## 2023-01-25 ENCOUNTER — LAB (OUTPATIENT)
Dept: LAB | Facility: CLINIC | Age: 38
End: 2023-01-25
Attending: FAMILY MEDICINE
Payer: COMMERCIAL

## 2023-01-25 DIAGNOSIS — Z00.6 EXAMINATION OF PARTICIPANT OR CONTROL IN CLINICAL RESEARCH: ICD-10-CM

## 2023-01-25 PROCEDURE — 36415 COLL VENOUS BLD VENIPUNCTURE: CPT

## 2023-05-23 DIAGNOSIS — Z00.6 EXAMINATION OF PARTICIPANT OR CONTROL IN CLINICAL RESEARCH: Primary | ICD-10-CM

## 2023-05-24 ENCOUNTER — LAB (OUTPATIENT)
Dept: LAB | Facility: CLINIC | Age: 38
End: 2023-05-24
Attending: STUDENT IN AN ORGANIZED HEALTH CARE EDUCATION/TRAINING PROGRAM

## 2023-05-24 DIAGNOSIS — Z00.6 EXAMINATION OF PARTICIPANT OR CONTROL IN CLINICAL RESEARCH: ICD-10-CM

## 2023-05-24 PROCEDURE — 36415 COLL VENOUS BLD VENIPUNCTURE: CPT

## 2023-07-15 ENCOUNTER — HOSPITAL ENCOUNTER (EMERGENCY)
Facility: CLINIC | Age: 38
Discharge: HOME | End: 2023-07-15
Attending: EMERGENCY MEDICINE | Admitting: EMERGENCY MEDICINE
Payer: COMMERCIAL

## 2023-07-15 VITALS
WEIGHT: 145 LBS | TEMPERATURE: 97.3 F | BODY MASS INDEX: 26.68 KG/M2 | SYSTOLIC BLOOD PRESSURE: 111 MMHG | RESPIRATION RATE: 15 BRPM | HEIGHT: 62 IN | DIASTOLIC BLOOD PRESSURE: 80 MMHG | OXYGEN SATURATION: 98 % | HEART RATE: 70 BPM

## 2023-07-15 DIAGNOSIS — I47.10 PAROXYSMAL SUPRAVENTRICULAR TACHYCARDIA (H): ICD-10-CM

## 2023-07-15 DIAGNOSIS — R19.7 DIARRHEA OF PRESUMED INFECTIOUS ORIGIN: ICD-10-CM

## 2023-07-15 DIAGNOSIS — R11.0 NAUSEA: ICD-10-CM

## 2023-07-15 LAB
ANION GAP SERPL CALCULATED.3IONS-SCNC: 10 MMOL/L (ref 7–15)
BASOPHILS # BLD AUTO: 0 10E3/UL (ref 0–0.2)
BASOPHILS NFR BLD AUTO: 0 %
BUN SERPL-MCNC: 11 MG/DL (ref 6–20)
CALCIUM SERPL-MCNC: 9 MG/DL (ref 8.6–10)
CHLORIDE SERPL-SCNC: 104 MMOL/L (ref 98–107)
CREAT SERPL-MCNC: 0.81 MG/DL (ref 0.51–0.95)
DEPRECATED HCO3 PLAS-SCNC: 23 MMOL/L (ref 22–29)
EOSINOPHIL # BLD AUTO: 0.2 10E3/UL (ref 0–0.7)
EOSINOPHIL NFR BLD AUTO: 2 %
ERYTHROCYTE [DISTWIDTH] IN BLOOD BY AUTOMATED COUNT: 11.9 % (ref 10–15)
GFR SERPL CREATININE-BSD FRML MDRD: >90 ML/MIN/1.73M2
GLUCOSE SERPL-MCNC: 98 MG/DL (ref 70–99)
HCT VFR BLD AUTO: 41.4 % (ref 35–47)
HGB BLD-MCNC: 13.3 G/DL (ref 11.7–15.7)
IMM GRANULOCYTES # BLD: 0 10E3/UL
IMM GRANULOCYTES NFR BLD: 0 %
LYMPHOCYTES # BLD AUTO: 1.5 10E3/UL (ref 0.8–5.3)
LYMPHOCYTES NFR BLD AUTO: 14 %
MAGNESIUM SERPL-MCNC: 1.9 MG/DL (ref 1.7–2.3)
MCH RBC QN AUTO: 31.2 PG (ref 26.5–33)
MCHC RBC AUTO-ENTMCNC: 32.1 G/DL (ref 31.5–36.5)
MCV RBC AUTO: 97 FL (ref 78–100)
MONOCYTES # BLD AUTO: 0.8 10E3/UL (ref 0–1.3)
MONOCYTES NFR BLD AUTO: 8 %
NEUTROPHILS # BLD AUTO: 7.8 10E3/UL (ref 1.6–8.3)
NEUTROPHILS NFR BLD AUTO: 76 %
NRBC # BLD AUTO: 0 10E3/UL
NRBC BLD AUTO-RTO: 0 /100
PLATELET # BLD AUTO: 237 10E3/UL (ref 150–450)
POTASSIUM SERPL-SCNC: 4 MMOL/L (ref 3.4–5.3)
RBC # BLD AUTO: 4.26 10E6/UL (ref 3.8–5.2)
SODIUM SERPL-SCNC: 137 MMOL/L (ref 136–145)
WBC # BLD AUTO: 10.3 10E3/UL (ref 4–11)

## 2023-07-15 PROCEDURE — 99284 EMERGENCY DEPT VISIT MOD MDM: CPT | Mod: 25

## 2023-07-15 PROCEDURE — 83735 ASSAY OF MAGNESIUM: CPT | Performed by: EMERGENCY MEDICINE

## 2023-07-15 PROCEDURE — 96361 HYDRATE IV INFUSION ADD-ON: CPT

## 2023-07-15 PROCEDURE — 96374 THER/PROPH/DIAG INJ IV PUSH: CPT

## 2023-07-15 PROCEDURE — 250N000011 HC RX IP 250 OP 636: Mod: JZ | Performed by: EMERGENCY MEDICINE

## 2023-07-15 PROCEDURE — 36415 COLL VENOUS BLD VENIPUNCTURE: CPT | Performed by: EMERGENCY MEDICINE

## 2023-07-15 PROCEDURE — 82310 ASSAY OF CALCIUM: CPT | Performed by: EMERGENCY MEDICINE

## 2023-07-15 PROCEDURE — 85025 COMPLETE CBC W/AUTO DIFF WBC: CPT | Performed by: EMERGENCY MEDICINE

## 2023-07-15 PROCEDURE — 93005 ELECTROCARDIOGRAM TRACING: CPT

## 2023-07-15 PROCEDURE — 258N000003 HC RX IP 258 OP 636: Performed by: EMERGENCY MEDICINE

## 2023-07-15 RX ORDER — ONDANSETRON 4 MG/1
4 TABLET, ORALLY DISINTEGRATING ORAL EVERY 8 HOURS PRN
Qty: 12 TABLET | Refills: 0 | Status: SHIPPED | OUTPATIENT
Start: 2023-07-15

## 2023-07-15 RX ORDER — ONDANSETRON 2 MG/ML
4 INJECTION INTRAMUSCULAR; INTRAVENOUS EVERY 30 MIN PRN
Status: DISCONTINUED | OUTPATIENT
Start: 2023-07-15 | End: 2023-07-15 | Stop reason: HOSPADM

## 2023-07-15 RX ADMIN — SODIUM CHLORIDE 1000 ML: 9 INJECTION, SOLUTION INTRAVENOUS at 11:19

## 2023-07-15 RX ADMIN — ONDANSETRON 4 MG: 2 INJECTION INTRAMUSCULAR; INTRAVENOUS at 11:20

## 2023-07-15 ASSESSMENT — ACTIVITIES OF DAILY LIVING (ADL)
ADLS_ACUITY_SCORE: 35
ADLS_ACUITY_SCORE: 35

## 2023-07-15 NOTE — ED TRIAGE NOTES
Pt states she had self converting episode of SVT with HR 190s, lasting approx 5 mins PTA while standing. Currently HR 74. Reports nausea. No CP or SOB. One other episode of SVT in Dec that lasted 3 hrs before converting on own. Not currently taking any cardiac meds. ABC intact.

## 2023-07-15 NOTE — ED PROVIDER NOTES
History     Chief Complaint:  Tachycardia       The history is provided by the patient.      Aspen Penn is a 38 year old female with a history of PSVT, anxiety, and hyperlipidemia who presents with tachycardia. Earlier this morning, she had a self converting episode of SVT with a heart rate of 194 that lasted approximately 5 minutes before resolving on its own. Additionally, she was pale appearing, diaphoretic, nauseated, and short of breath at the time. She continues to have lightheadedness and intermittent nausea since then, but all her other symptoms resolved. Her heart rate in triage was noted to be 74. She had a previous episode of SVT in December of 2022 where she had a heart rate of 150. That episode lasted about 3 hours before converting on its own. Another symptom mentioned was an episode of diarrhea this morning. Recent sick contacts include her son who had an episode of vomiting this morning and her  who has also had diarrhea recently. She denies recent long distance travels. She is not currently on any cardiac medications. She endorses drinking coffee frequently, but her  notes that they are currently using half caffeine coffee. She further denies chest pain, abdominal pain, fever, or leg swelling/frequencty.     Independent Historian:   None - Patient Only    Review of External Notes:   Reviewed urgency room visit from 12/22/2022 regarding her initial visit for PSVT that converted with vagal maneuvers.    Medications:    Augmentin   Zithromax   Ceftin   Pristiq   Valtrex   Ambien     Past Medical History:    Anxiety   Depressive disorder   Dysmenorrhea   HPV   Insomnia   Vasospasm   Adjustment disorder w/ mixed anxiety and depressed mood   HLD   PSVT     Past Surgical History:    Posterior colporrhaphy   Hysterectomy w/ B/L salpingectomy and paravaginal repair   B/L Lasik eye surgery   Gustine teeth extraction    Rectocele repair     Physical Exam     Patient Vitals for the past  "24 hrs:   BP Temp Temp src Pulse Resp SpO2 Height Weight   07/15/23 1115 111/80 -- -- 70 15 98 % -- --   07/15/23 1100 121/84 -- -- 79 14 99 % -- --   07/15/23 0958 (!) 128/91 97.3  F (36.3  C) Temporal 74 16 100 % -- --   07/15/23 0955 -- -- -- -- -- -- 1.575 m (5' 2\") 65.8 kg (145 lb)        Physical Exam  Vitals and nursing note reviewed.   Constitutional:       General: She is not in acute distress.     Appearance: She is not ill-appearing.   HENT:      Head: Normocephalic and atraumatic.      Right Ear: External ear normal.      Left Ear: External ear normal.      Nose: Nose normal.   Eyes:      Extraocular Movements: Extraocular movements intact.      Conjunctiva/sclera: Conjunctivae normal.   Cardiovascular:      Rate and Rhythm: Normal rate and regular rhythm.      Heart sounds: No murmur heard.  Pulmonary:      Effort: Pulmonary effort is normal. No respiratory distress.      Breath sounds: Normal breath sounds. No wheezing, rhonchi or rales.   Abdominal:      General: Abdomen is flat. Bowel sounds are normal. There is no distension.      Palpations: Abdomen is soft.      Tenderness: There is no abdominal tenderness. There is no guarding or rebound.   Musculoskeletal:         General: No deformity or signs of injury.      Cervical back: Normal range of motion and neck supple.   Skin:     General: Skin is warm and dry.      Findings: No rash.   Neurological:      Mental Status: She is alert and oriented to person, place, and time.   Psychiatric:         Behavior: Behavior normal.           Emergency Department Course   ECG  ECG taken at 1003, ECG read at 1003  Normal sinus rhythm   Normal ECG   Rate 71 bpm. CO interval 154 ms. QRS duration 88 ms. QT/QTc 412/447 ms. P-R-T axes 27 60 39.     Laboratory:  Labs Ordered and Resulted from Time of ED Arrival to Time of ED Departure   BASIC METABOLIC PANEL - Normal       Result Value    Sodium 137      Potassium 4.0      Chloride 104      Carbon Dioxide (CO2) 23   "    Anion Gap 10      Urea Nitrogen 11.0      Creatinine 0.81      Calcium 9.0      Glucose 98      GFR Estimate >90     MAGNESIUM - Normal    Magnesium 1.9     CBC WITH PLATELETS AND DIFFERENTIAL    WBC Count 10.3      RBC Count 4.26      Hemoglobin 13.3      Hematocrit 41.4      MCV 97      MCH 31.2      MCHC 32.1      RDW 11.9      Platelet Count 237      % Neutrophils 76      % Lymphocytes 14      % Monocytes 8      % Eosinophils 2      % Basophils 0      % Immature Granulocytes 0      NRBCs per 100 WBC 0      Absolute Neutrophils 7.8      Absolute Lymphocytes 1.5      Absolute Monocytes 0.8      Absolute Eosinophils 0.2      Absolute Basophils 0.0      Absolute Immature Granulocytes 0.0      Absolute NRBCs 0.0          Emergency Department Course & Assessments:    Interventions:  1120 Zofran 4 mg IV     Assessments:  1058 I obtained history and examined the patient as noted above.  1237 I rechecked the patient and explained findings. I discussed plan for discharge home.    Independent Interpretation (X-rays, CTs, rhythm strip):  None    Consultations/Discussion of Management or Tests:  None     Social Determinants of Health affecting care:   None    Disposition:  The patient was discharged to home.     Impression & Plan    Medical Decision Makin-year-old female presenting with 5-minute episode of palpitations and heart rate in the 190s.  Her home Fitbit monitor did show findings consistent with likely SVT.  She is now currently in normal sinus rhythm with no signs of arrhythmia.  She has had similar episode of SVT in the past.  She is also had recent illness with nausea and diarrhea which I suspect is a viral gastroenteritis.  Her abdominal exam is benign.  It is possible that her dehydration may have helped precipitate this episode of SVT.  We checked labs which were unremarkable including normal electrolytes.  She remained in normal sinus rhythm on the monitor while in the ED.  She felt better after an  IV fluid bolus and Zofran.  We will plan to send home with prescription for Zofran and we discussed follow-up recommendations with her primary care physician and return precautions.    Diagnosis:    ICD-10-CM    1. Paroxysmal supraventricular tachycardia (H)  I47.1       2. Nausea  R11.0       3. Diarrhea of presumed infectious origin  R19.7            Discharge Medications:  Discharge Medication List as of 7/15/2023 12:47 PM      START taking these medications    Details   ondansetron (ZOFRAN ODT) 4 MG ODT tab Take 1 tablet (4 mg) by mouth every 8 hours as needed for nausea, Disp-12 tablet, R-0, E-Prescribe              Scribe Disclosure:  I, Eric Mendez, am serving as a scribe at 10:50 AM on 7/15/2023 to document services personally performed by Jarred Valerio MD based on my observations and the provider's statements to me.   7/15/2023   Jarred Valerio MD Goodwin, Shaun M, MD  07/15/23 1523

## 2023-07-15 NOTE — ED NOTES
Cardiac (Adult) Cardiac WDL:  (PT HAD a 5 min episode of SVT this am with nausea. no SOB, dizziness, CP. pt has been in SR while in the ER.)     Gastrointestinal Nausea/Vomiting Signs/Symptoms: other (see comments)  (pt had nausea with episode of SVT. pt got 1 dose of zofran and nausea has resolved since in ER.)  Additional Documentation: Nausea/Vomiting Signs/Symptoms (Row)

## 2023-07-17 LAB
ATRIAL RATE - MUSE: 71 BPM
DIASTOLIC BLOOD PRESSURE - MUSE: NORMAL MMHG
INTERPRETATION ECG - MUSE: NORMAL
P AXIS - MUSE: 27 DEGREES
PR INTERVAL - MUSE: 154 MS
QRS DURATION - MUSE: 88 MS
QT - MUSE: 412 MS
QTC - MUSE: 447 MS
R AXIS - MUSE: 60 DEGREES
SYSTOLIC BLOOD PRESSURE - MUSE: NORMAL MMHG
T AXIS - MUSE: 39 DEGREES
VENTRICULAR RATE- MUSE: 71 BPM

## 2024-07-14 ENCOUNTER — HEALTH MAINTENANCE LETTER (OUTPATIENT)
Age: 39
End: 2024-07-14

## 2025-04-23 ENCOUNTER — THERAPY VISIT (OUTPATIENT)
Dept: PHYSICAL THERAPY | Facility: REHABILITATION | Age: 40
End: 2025-04-23
Payer: COMMERCIAL

## 2025-04-23 DIAGNOSIS — G89.29 CHRONIC RIGHT SHOULDER PAIN: Primary | ICD-10-CM

## 2025-04-23 DIAGNOSIS — M25.511 CHRONIC RIGHT SHOULDER PAIN: Primary | ICD-10-CM

## 2025-04-23 PROCEDURE — 97140 MANUAL THERAPY 1/> REGIONS: CPT | Mod: GP | Performed by: PHYSICAL THERAPIST

## 2025-04-23 PROCEDURE — 97161 PT EVAL LOW COMPLEX 20 MIN: CPT | Mod: GP | Performed by: PHYSICAL THERAPIST

## 2025-04-23 PROCEDURE — 97110 THERAPEUTIC EXERCISES: CPT | Mod: GP | Performed by: PHYSICAL THERAPIST

## 2025-04-23 ASSESSMENT — ACTIVITIES OF DAILY LIVING (ADL)
PUTTING_ON_A_SHIRT_THAT_BUTTONS_DOWN_THE_FRONT: 0
REACHING_FOR_SOMETHING_ON_A_HIGH_SHELF: 2
WHEN_LYING_ON_THE_INVOLVED_SIDE: 2
TOUCHING_THE_BACK_OF_YOUR_NECK: 0
PUTTING_ON_YOUR_PANTS: 0
PUSHING_WITH_THE_INVOLVED_ARM: 1
CARRYING_A_HEAVY_OBJECT_OF_10_POUNDS: 0
PLEASE_INDICATE_YOR_PRIMARY_REASON_FOR_REFERRAL_TO_THERAPY:: SHOULDER
WASHING_YOUR_BACK: 0
REMOVING_SOMETHING_FROM_YOUR_BACK_POCKET: 0
AT_ITS_WORST?: 2
PUTTING_ON_AN_UNDERSHIRT_OR_A_PULLOVER_SWEATER: 1
PLACING_AN_OBJECT_ON_A_HIGH_SHELF: 1
WASHING_YOUR_HAIR?: 0

## 2025-04-23 NOTE — PROGRESS NOTES
PHYSICAL THERAPY EVALUATION  Type of Visit: Evaluation        Fall Risk Screen:  Have you fallen 2 or more times in the past year?: No  Have you fallen and had an injury in the past year?: No    Subjective   She was moving some heavy water jogs, she felt it give way about 2 months ago. She didn't hear pop. She has been trying to guard it since. She has stopped rock climbing. She almost thought she didn't need to come in.    She noted that she couldn't sleep on it or perform rock climbing    Pain level: 0  Worst pain in the last week: 2/10 lifting during the motion.    She has not returned to rock climbing, 1x/week    Negative radicular symptom, negative red flags    Current exercise: biking, rowing       Presenting condition or subjective complaint: rt shoulder pain  Date of onset: 02/01/25    Relevant medical history: Depression; Heart problems   Dates & types of surgery: lasik cardic ablation hysterectomy sinus surgry    Prior diagnostic imaging/testing results:       Prior therapy history for the same diagnosis, illness or injury: No      Prior Level of Function  Transfers: Independent  Ambulation: Independent  ADL: Independent      Living Environment  Social support: With a significant other or spouse   Type of home: House   Stairs to enter the home: No       Ramp: No   Stairs inside the home: Yes 30 Is there a railing: Yes     Help at home: None  Equipment owned:       Employment: Yes physician  Hobbies/Interests: horseback riding and rock climbing and biking    Patient goals for therapy: rock climb    Pain assessment: Pain denied     Objective   SHOULDER EVALUATION  PAIN: Pain Level at Rest: 0/10  POSTURE: Sitting Posture: Rounded shoulders    ROM:   (Degrees) Left AROM Left PROM Right AROM  Right PROM   Shoulder Flexion 170  165    Shoulder Extension       Shoulder Abduction 170  170    Shoulder Adduction       Shoulder Internal Rotation Thumb to T6  Thumb to T2    Shoulder External Rotation 65  62     Shoulder Horizontal Abduction       Shoulder Horizontal Adduction       Shoulder Flexion ER       Shoulder Flexion IR       Elbow Extension       Elbow Flexion         STRENGTH: Remaining 5/5  Pain: - none + mild ++ moderate +++ severe  Strength Scale: 0-5/5 Left Right   Shoulder Flexion     Shoulder Extension     Shoulder Abduction     Shoulder Adduction     Shoulder Internal Rotation     Shoulder External Rotation 5 3+   Shoulder Horizontal Abduction     Shoulder Horizontal Adduction     Elbow Flexion     Elbow Extension     Mid Trap     Lower Trap     Rhomboid     Serratus Anterior       FLEXIBILITY: WNL  SPECIAL TESTS:  Negative bicep load, negative infraspinatus  test, positive bicep load  PALPATION:  middle deltoid, proximal bicep  JOINT MOBILITY: WNL  CERVICAL SCREEN: WNL    Assessment & Plan   CLINICAL IMPRESSIONS  Medical Diagnosis: R Shoulder pain    Treatment Diagnosis: R shoulder pain   Impression/Assessment:  Nishi is presenting with signs and symptoms of R GH strain 2/2025. She is noting increased pain with lifting son. Positive speeds test on the R and palpable muscle spasms. PT initiated HEP and manual therapy this session. Negative red flags.    Clinical Decision Making (Complexity):  Clinical Presentation: Stable/Uncomplicated  Clinical Presentation Rationale: based on medical and personal factors listed in PT evaluation  Clinical Decision Making (Complexity): Low complexity    PLAN OF CARE  Treatment Interventions:  Interventions: Manual Therapy, Neuromuscular Re-education, Therapeutic Exercise    Long Term Goals     PT Goal 1  Goal Identifier: 1  Goal Description: Patient will be independent with home exercise program and self-care  Target Date: 07/22/25  PT Goal 2  Goal Identifier: 2  Goal Description: Patient will return to rock climbing with pain <2/10 for return to PLOF  Target Date: 07/21/25  PT Goal 3  Goal Identifier: 3  Goal Description: Patient will be able to perform lifting motion  with son without increase in pain  Target Date: 07/21/25      Frequency of Treatment: 1x every 2 weeks  Duration of Treatment: 90 days    Recommended Referrals to Other Professionals: none  Education Assessment:   Learner/Method: Patient    Risks and benefits of evaluation/treatment have been explained.   Patient/Family/caregiver agrees with Plan of Care.     Evaluation Time:     PT Eval, Low Complexity Minutes ([id number]): 20       Signing Clinician: Marilee Carrington PT

## 2025-05-07 ENCOUNTER — THERAPY VISIT (OUTPATIENT)
Dept: PHYSICAL THERAPY | Facility: REHABILITATION | Age: 40
End: 2025-05-07
Payer: COMMERCIAL

## 2025-05-07 DIAGNOSIS — M25.511 CHRONIC RIGHT SHOULDER PAIN: Primary | ICD-10-CM

## 2025-05-07 DIAGNOSIS — G89.29 CHRONIC RIGHT SHOULDER PAIN: Primary | ICD-10-CM

## 2025-05-07 PROCEDURE — 97140 MANUAL THERAPY 1/> REGIONS: CPT | Mod: GP | Performed by: PHYSICAL THERAPIST

## 2025-05-07 PROCEDURE — 97110 THERAPEUTIC EXERCISES: CPT | Mod: GP | Performed by: PHYSICAL THERAPIST

## 2025-06-11 ENCOUNTER — THERAPY VISIT (OUTPATIENT)
Dept: PHYSICAL THERAPY | Facility: REHABILITATION | Age: 40
End: 2025-06-11
Payer: COMMERCIAL

## 2025-06-11 DIAGNOSIS — M25.511 CHRONIC RIGHT SHOULDER PAIN: Primary | ICD-10-CM

## 2025-06-11 DIAGNOSIS — G89.29 CHRONIC RIGHT SHOULDER PAIN: Primary | ICD-10-CM

## 2025-06-11 PROCEDURE — 97140 MANUAL THERAPY 1/> REGIONS: CPT | Mod: GP | Performed by: PHYSICAL THERAPIST

## 2025-06-11 PROCEDURE — 97110 THERAPEUTIC EXERCISES: CPT | Mod: GP | Performed by: PHYSICAL THERAPIST

## (undated) DEVICE — TUBING LAP SUCT/IRRIG STRYKER 250070500

## (undated) DEVICE — MAT FLOOR SURGICAL 40X38 0702140238

## (undated) DEVICE — SUTURE VICRYL+ 0 36IN CT-1 UND VCP946H

## (undated) DEVICE — SU ETHIBOND 2-0 SH 30" X833H

## (undated) DEVICE — SYR 50ML SLIP TIP W/O NDL 309654

## (undated) DEVICE — DECANTER VIAL 2006S

## (undated) DEVICE — SUTURE VICRYL+ 3-0 18IN PS-2 UND VCP497H

## (undated) DEVICE — TUBING FILTER TRI-LUMEN AIRSEAL ASC-EVAC1

## (undated) DEVICE — ADAPTER DRAPE ALLY AU-AD

## (undated) DEVICE — LUBRICANT INST ELECTROLUBE EL101

## (undated) DEVICE — SU VICRYL+ 0 8-18 CT1/CR UND VCP840D

## (undated) DEVICE — DRAPE U SPLIT 74X120" 29440

## (undated) DEVICE — PREP SCRUB SOL EXIDINE 4% CHG 4OZ 29002-404

## (undated) DEVICE — SUTURE VICRYL+ 0 27IN CT-1 UND VCP260H

## (undated) DEVICE — SU ETHILON 4-0 PS-2 18" BLACK 1667H

## (undated) DEVICE — ADH SKIN CLOSURE PREMIERPRO EXOFIN 1.0ML 3470

## (undated) DEVICE — MORCELLATOR XCISE SINGLE USE

## (undated) DEVICE — CUSTOM PACK DA VINCI GYN SMA5BDVHEA

## (undated) DEVICE — CATH FOLEY 5CC 16FR SIL/LTX 0165V16S

## (undated) DEVICE — SYR 10ML LL W/O NDL 302995

## (undated) DEVICE — NDL INSUFFLATION 13GA 120MM C2201

## (undated) DEVICE — ENDO TROCAR FIRST ENTRY KII FIOS Z-THRD 05X100MM CTF03

## (undated) DEVICE — Device

## (undated) DEVICE — DAVINCI XI OBTURATOR BLADELESS 8MM 470359

## (undated) DEVICE — TUBING IRRIG TUR Y TYPE 96" LF 6543-01

## (undated) DEVICE — SYR 10ML FINGER CONTROL W/O NDL 309695

## (undated) DEVICE — SU WND CLOSURE VLOC 180 ABS 0 12" GS-21 VLOCL0316

## (undated) DEVICE — GOWN XXL 9575

## (undated) DEVICE — BRIEF STRETCH XL MPS40

## (undated) DEVICE — BANDAGE ADH LF 1X3 ABN3100A

## (undated) DEVICE — GLOVE SURG PI ULTRA TOUCH M SZ 6-1/2 LF

## (undated) DEVICE — SUCTION MANIFOLD NEPTUNE 2 SYS 1 PORT 702-025-000

## (undated) DEVICE — PREP CHLORAPREP 26ML TINTED HI-LITE ORANGE 930815

## (undated) DEVICE — GLOVE SURG PI ULTRA TOUCH M SZ 7-1/2 LF

## (undated) DEVICE — PAD POS XL 1X20X40IN PINK PIGAZZI

## (undated) DEVICE — NEEDLE HYPO 21GA X 1-1/2 SAFETY 305917

## (undated) DEVICE — SUTURE GORTEX 2N02A

## (undated) DEVICE — PROTECTOR ARM STANDARD ONE STEP

## (undated) DEVICE — DAVINCI XI HANDPIECE ESU VESSEL SEALER 8MM EXT 480422

## (undated) DEVICE — GLOVE SURG PI ULTRA TOUCH M SZ 7 LF 42670

## (undated) DEVICE — ENDO OBTURATOR ACCESS PORT BLADELESS 8X100MM IAS8-100LP

## (undated) DEVICE — DAVINCI XI DRAPE ARM 470015

## (undated) DEVICE — SUTURE VICRYL+ 2-0 27IN SH UND VCP417H

## (undated) DEVICE — BLADE KNIFE SURG 15 371115

## (undated) DEVICE — DAVINCI HOT SHEARS TIP COVER  400180

## (undated) DEVICE — SOLUTION IV 2B0304X STRL WATER 1000ML

## (undated) DEVICE — SYSTEM LAPAROVUE VISIBILITY LAPVUE10

## (undated) DEVICE — DAVINCI XI SEAL UNIVERSAL 5-8MM 470361

## (undated) DEVICE — BLADE KNIFE SURG 11 371111

## (undated) RX ORDER — DEXAMETHASONE SODIUM PHOSPHATE 10 MG/ML
INJECTION INTRAMUSCULAR; INTRAVENOUS
Status: DISPENSED
Start: 2022-05-25

## (undated) RX ORDER — BACITRACIN ZINC 500 [USP'U]/G
OINTMENT TOPICAL
Status: DISPENSED
Start: 2022-05-25

## (undated) RX ORDER — LIDOCAINE HYDROCHLORIDE 10 MG/ML
INJECTION, SOLUTION EPIDURAL; INFILTRATION; INTRACAUDAL; PERINEURAL
Status: DISPENSED
Start: 2022-05-25

## (undated) RX ORDER — KETOROLAC TROMETHAMINE 30 MG/ML
INJECTION, SOLUTION INTRAMUSCULAR; INTRAVENOUS
Status: DISPENSED
Start: 2022-05-25

## (undated) RX ORDER — FENTANYL CITRATE 50 UG/ML
INJECTION, SOLUTION INTRAMUSCULAR; INTRAVENOUS
Status: DISPENSED
Start: 2022-05-25

## (undated) RX ORDER — BUPIVACAINE HYDROCHLORIDE 2.5 MG/ML
INJECTION, SOLUTION EPIDURAL; INFILTRATION; INTRACAUDAL
Status: DISPENSED
Start: 2022-05-25

## (undated) RX ORDER — ONDANSETRON 2 MG/ML
INJECTION INTRAMUSCULAR; INTRAVENOUS
Status: DISPENSED
Start: 2022-05-25

## (undated) RX ORDER — PROPOFOL 10 MG/ML
INJECTION, EMULSION INTRAVENOUS
Status: DISPENSED
Start: 2022-05-25

## (undated) RX ORDER — LIDOCAINE HYDROCHLORIDE AND EPINEPHRINE 10; 10 MG/ML; UG/ML
INJECTION, SOLUTION INFILTRATION; PERINEURAL
Status: DISPENSED
Start: 2022-05-25